# Patient Record
Sex: MALE | Race: BLACK OR AFRICAN AMERICAN | NOT HISPANIC OR LATINO | Employment: OTHER | URBAN - METROPOLITAN AREA
[De-identification: names, ages, dates, MRNs, and addresses within clinical notes are randomized per-mention and may not be internally consistent; named-entity substitution may affect disease eponyms.]

---

## 2017-05-17 PROBLEM — R06.09 DOE (DYSPNEA ON EXERTION): Status: ACTIVE | Noted: 2017-05-17

## 2017-05-17 PROBLEM — E11.621 DIABETIC ULCER OF TOE: Status: ACTIVE | Noted: 2017-05-17

## 2017-05-17 PROBLEM — L97.509 DIABETIC ULCER OF TOE: Status: ACTIVE | Noted: 2017-05-17

## 2017-07-18 PROBLEM — R06.09 DOE (DYSPNEA ON EXERTION): Status: RESOLVED | Noted: 2017-05-17 | Resolved: 2017-07-18

## 2017-07-18 PROBLEM — R05.3 CHRONIC COUGH: Status: ACTIVE | Noted: 2017-07-18

## 2018-03-27 ENCOUNTER — OFFICE VISIT (OUTPATIENT)
Dept: FAMILY MEDICINE | Facility: CLINIC | Age: 63
End: 2018-03-27
Payer: MEDICARE

## 2018-03-27 VITALS
RESPIRATION RATE: 16 BRPM | DIASTOLIC BLOOD PRESSURE: 74 MMHG | HEART RATE: 56 BPM | BODY MASS INDEX: 33.47 KG/M2 | HEIGHT: 68 IN | WEIGHT: 220.81 LBS | OXYGEN SATURATION: 98 % | SYSTOLIC BLOOD PRESSURE: 126 MMHG

## 2018-03-27 DIAGNOSIS — I42.9 CARDIOMYOPATHY, UNSPECIFIED TYPE: ICD-10-CM

## 2018-03-27 DIAGNOSIS — G63 NEUROPATHY ASSOCIATED WITH ENDOCRINE DISORDER: Primary | ICD-10-CM

## 2018-03-27 DIAGNOSIS — E11.59 TYPE 2 DIABETES MELLITUS WITH OTHER CIRCULATORY COMPLICATION, WITH LONG-TERM CURRENT USE OF INSULIN: ICD-10-CM

## 2018-03-27 DIAGNOSIS — Z79.4 TYPE 2 DIABETES MELLITUS WITH OTHER CIRCULATORY COMPLICATION, WITH LONG-TERM CURRENT USE OF INSULIN: ICD-10-CM

## 2018-03-27 DIAGNOSIS — I10 ESSENTIAL HYPERTENSION: ICD-10-CM

## 2018-03-27 DIAGNOSIS — I73.9 PAD (PERIPHERAL ARTERY DISEASE): ICD-10-CM

## 2018-03-27 DIAGNOSIS — E34.9 NEUROPATHY ASSOCIATED WITH ENDOCRINE DISORDER: Primary | ICD-10-CM

## 2018-03-27 DIAGNOSIS — N18.30 CKD (CHRONIC KIDNEY DISEASE) STAGE 3, GFR 30-59 ML/MIN: ICD-10-CM

## 2018-03-27 DIAGNOSIS — E78.2 MIXED HYPERLIPIDEMIA: ICD-10-CM

## 2018-03-27 DIAGNOSIS — Z89.511 STATUS POST BELOW KNEE AMPUTATION OF RIGHT LOWER EXTREMITY: ICD-10-CM

## 2018-03-27 DIAGNOSIS — K59.09 CHRONIC CONSTIPATION: ICD-10-CM

## 2018-03-27 PROCEDURE — 3045F PR MOST RECENT HEMOGLOBIN A1C LEVEL 7.0-9.0%: CPT | Mod: CPTII,S$GLB,, | Performed by: INTERNAL MEDICINE

## 2018-03-27 PROCEDURE — 99999 PR PBB SHADOW E&M-EST. PATIENT-LVL IV: CPT | Mod: PBBFAC,,, | Performed by: INTERNAL MEDICINE

## 2018-03-27 PROCEDURE — 3074F SYST BP LT 130 MM HG: CPT | Mod: CPTII,S$GLB,, | Performed by: INTERNAL MEDICINE

## 2018-03-27 PROCEDURE — 3078F DIAST BP <80 MM HG: CPT | Mod: CPTII,S$GLB,, | Performed by: INTERNAL MEDICINE

## 2018-03-27 PROCEDURE — 99499 UNLISTED E&M SERVICE: CPT | Mod: S$GLB,,, | Performed by: INTERNAL MEDICINE

## 2018-03-27 PROCEDURE — 99214 OFFICE O/P EST MOD 30 MIN: CPT | Mod: S$GLB,,, | Performed by: INTERNAL MEDICINE

## 2018-03-27 RX ORDER — LISINOPRIL 40 MG/1
40 TABLET ORAL DAILY
COMMUNITY
Start: 2018-03-21 | End: 2018-08-07 | Stop reason: SDUPTHER

## 2018-03-27 NOTE — PATIENT INSTRUCTIONS
Your recent labs showed a HgbA1c of 7.8. I have requested that you see podiatry to evaluate your left foot so that we can keep it healthy. I have prescribed a medicine for chronic constipation called linzess. I would like to see you back in about 3 months to see how you are doing.

## 2018-03-27 NOTE — PROGRESS NOTES
Subjective:       Patient ID: Brando Melgoza is a 62 y.o. male.    Chief Complaint: Establish Care    This is a new patient to me.  I have reviewed the PMH,  and FH for this patient. The patient had been seeing a doctor in [place], but he is changing to us. HE has a PMH significant for insulin-requiring diabetes and a BKA of his right leg. His last HgbA1c was pretty good at 7.8. HE has seen a kidney specialist for renal insufficiency recently. HE reports that he has been having trouble with constipation. He takes miralax daily, but only has BM's every few days.     Constipation   This is a chronic problem. The current episode started more than 1 month ago. The problem is unchanged. His stool frequency is 2 to 3 times per week. The stool is described as formed. The patient is not on a high fiber diet. He does not exercise regularly. There has been adequate water intake. Associated symptoms include bloating. Pertinent negatives include no abdominal pain, anorexia, back pain, diarrhea, difficulty urinating, fecal incontinence, fever, flatus, melena, nausea, rectal pain, vomiting or weight loss. Risk factors include obesity. He has tried diet changes and laxatives for the symptoms. The treatment provided mild relief.   Diabetes   He presents for his follow-up diabetic visit. He has type 2 diabetes mellitus. His disease course has been stable. There are no hypoglycemic associated symptoms. Pertinent negatives for hypoglycemia include no dizziness, headaches, nervousness/anxiousness or tremors. Associated symptoms include foot paresthesias. Pertinent negatives for diabetes include no blurred vision, no chest pain, no fatigue, no foot ulcerations, no polydipsia, no polyphagia, no visual change, no weakness and no weight loss. Symptoms are stable. Diabetic complications include peripheral neuropathy and PVD. Risk factors for coronary artery disease include diabetes mellitus, male sex, sedentary lifestyle and obesity.  Current diabetic treatment includes insulin injections.     Review of Systems   Constitutional: Negative for chills, fatigue, fever and weight loss.   HENT: Negative for congestion, ear discharge, ear pain, rhinorrhea and sore throat.    Eyes: Negative for blurred vision, discharge, redness and itching.   Respiratory: Negative for cough, chest tightness, shortness of breath and wheezing.    Cardiovascular: Negative for chest pain, palpitations and leg swelling.   Gastrointestinal: Positive for bloating and constipation. Negative for abdominal pain, anorexia, diarrhea, flatus, melena, nausea, rectal pain and vomiting.   Endocrine: Negative for cold intolerance, heat intolerance, polydipsia and polyphagia.   Genitourinary: Negative for difficulty urinating, dysuria, flank pain, frequency and hematuria.   Musculoskeletal: Negative for arthralgias, back pain, joint swelling and myalgias.   Skin: Negative for color change and rash.   Neurological: Negative for dizziness, tremors, weakness, numbness and headaches.   Psychiatric/Behavioral: Negative for dysphoric mood and sleep disturbance. The patient is not nervous/anxious.        Objective:      Physical Exam   Constitutional: He appears well-developed and well-nourished.   HENT:   Head: Normocephalic and atraumatic.   Right Ear: External ear normal. Tympanic membrane is not erythematous. No middle ear effusion.   Left Ear: External ear normal. Tympanic membrane is not erythematous.  No middle ear effusion.   Mouth/Throat: No posterior oropharyngeal edema or posterior oropharyngeal erythema. No tonsillar exudate.   Eyes: Conjunctivae and EOM are normal. Pupils are equal, round, and reactive to light.   Neck: No thyromegaly present.   Cardiovascular: Normal rate, regular rhythm, normal heart sounds and intact distal pulses.    No murmur heard.  Pulmonary/Chest: Effort normal and breath sounds normal. He has no wheezes.   Abdominal: Bowel sounds are normal. He exhibits  distension. He exhibits no mass. There is no tenderness. There is no guarding.   Musculoskeletal: He exhibits no edema or tenderness.   Lymphadenopathy:     He has no cervical adenopathy.   Neurological: He displays normal reflexes. No cranial nerve deficit.   Skin: Skin is warm and dry. No rash noted.   Psychiatric: He has a normal mood and affect. His behavior is normal.       Assessment and Plan:     Problem List Items Addressed This Visit     Essential hypertension - BP looks good on current medications.       HLD (hyperlipidemia) - cholesterol looked great in November.       PAD (peripheral artery disease) - HE is working on controlling his risk factors. We will send him to podiatry to evaluate his good foot.       CKD (chronic kidney disease) stage 3, GFR 30-59 ml/min - HE has seen nephrology recently.       S/P BKA (below knee amputation) - stable.       Cardiomyopathy - HE is working on controlling his risk factors.       Type 2 diabetes mellitus with circulatory disorder, with long-term current use of insulin - His last HgbA1c was 7.8 in February.  Continue insulin.         Other Visit Diagnoses     Neuropathy associated with endocrine disorder    -  Primary - We will send to podiatry for evaluation.     Relevant Orders    Ambulatory referral to Podiatry    Chronic constipation    - Let's try adding linzess for chronic constipation. He has tried daily miralax and has had unsatisfactory results.     Relevant Medications    POLYETHYLENE GLYCOL 3350 (MIRALAX ORAL)    linaclotide (LINZESS) 145 mcg Cap capsule

## 2018-04-04 ENCOUNTER — TELEPHONE (OUTPATIENT)
Dept: FAMILY MEDICINE | Facility: CLINIC | Age: 63
End: 2018-04-04

## 2018-04-04 NOTE — TELEPHONE ENCOUNTER
----- Message from Trish Ramos sent at 4/4/2018  2:21 PM CDT -----  Contact: Mahsa from [facility]/ [phone]  Mahsa called in to get a medical necessity form. Needs to include clinical notes and frequency of patients medication for lancet and test strips.     Please call and advice.     Fax# [phone]

## 2018-04-05 ENCOUNTER — OFFICE VISIT (OUTPATIENT)
Dept: PODIATRY | Facility: CLINIC | Age: 63
End: 2018-04-05
Payer: MEDICARE

## 2018-04-05 VITALS
DIASTOLIC BLOOD PRESSURE: 81 MMHG | BODY MASS INDEX: 33.49 KG/M2 | RESPIRATION RATE: 18 BRPM | WEIGHT: 221 LBS | SYSTOLIC BLOOD PRESSURE: 167 MMHG | HEIGHT: 68 IN | HEART RATE: 56 BPM

## 2018-04-05 DIAGNOSIS — L60.9 DISEASE OF NAIL: ICD-10-CM

## 2018-04-05 DIAGNOSIS — E11.42 DIABETIC POLYNEUROPATHY ASSOCIATED WITH TYPE 2 DIABETES MELLITUS: Primary | ICD-10-CM

## 2018-04-05 PROCEDURE — 99203 OFFICE O/P NEW LOW 30 MIN: CPT | Mod: 25,S$GLB,, | Performed by: PODIATRIST

## 2018-04-05 PROCEDURE — 3045F PR MOST RECENT HEMOGLOBIN A1C LEVEL 7.0-9.0%: CPT | Mod: CPTII,S$GLB,, | Performed by: PODIATRIST

## 2018-04-05 PROCEDURE — 3079F DIAST BP 80-89 MM HG: CPT | Mod: CPTII,S$GLB,, | Performed by: PODIATRIST

## 2018-04-05 PROCEDURE — 3077F SYST BP >= 140 MM HG: CPT | Mod: CPTII,S$GLB,, | Performed by: PODIATRIST

## 2018-04-05 PROCEDURE — 11720 DEBRIDE NAIL 1-5: CPT | Mod: Q7,S$GLB,, | Performed by: PODIATRIST

## 2018-04-05 NOTE — PROGRESS NOTES
Subjective:      Patient ID: Brando Melgoza is a 63 y.o. male.    Chief Complaint: Diabetic Foot Exam (PCP Dr Vaughan last seen 3/27/2018)    Brando is a 63 y.o. male who presents to the clinic for evaluation and treatment of high risk feet. Brando has a past medical history of Cataract; Chronic kidney disease; Diabetes mellitus; Diabetes mellitus, type 2; Hyperlipidemia; and Hypertension. The patient's chief complaint is long, thick toenails. This patient has documented high risk feet requiring routine maintenance secondary to peripheral neuropathy.    PCP: Odilia Vaughan MD    Date Last Seen by PCP: 3/27/2018    Current shoe gear:  Affected Foot: Tennis shoes     Unaffected Foot: N/A    Hemoglobin A1C   Date Value Ref Range Status   02/15/2018 7.8 (H) 4.5 - 6.2 % Final     Comment:     According to ADA guidelines, hemoglobin A1C <7.0% represents  optimal control in non-pregnant diabetic patients.  Different  metrics may apply to specific populations.   Standards of Medical Care in Diabetes - 2016.  For the purpose of screening for the presence of diabetes:  <5.7%     Consistent with the absence of diabetes  5.7-6.4%  Consistent with increasing risk for diabetes   (prediabetes)  >or=6.5%  Consistent with diabetes  Currently no consensus exists for use of hemoglobin A1C  for diagnosis of diabetes for children.     11/10/2017 7.6 (H) 4.5 - 6.2 % Final     Comment:     According to ADA guidelines, hemoglobin A1C <7.0% represents  optimal control in non-pregnant diabetic patients.  Different  metrics may apply to specific populations.   Standards of Medical Care in Diabetes - 2016.  For the purpose of screening for the presence of diabetes:  <5.7%     Consistent with the absence of diabetes  5.7-6.4%  Consistent with increasing risk for diabetes   (prediabetes)  >or=6.5%  Consistent with diabetes  Currently no consensus exists for use of hemoglobin A1C  for diagnosis of diabetes for children.     07/03/2017 8.4 (H) 4.5  - 6.2 % Final     Comment:     According to ADA guidelines, hemoglobin A1C <7.0% represents  optimal control in non-pregnant diabetic patients.  Different  metrics may apply to specific populations.   Standards of Medical Care in Diabetes - 2016.  For the purpose of screening for the presence of diabetes:  <5.7%     Consistent with the absence of diabetes  5.7-6.4%  Consistent with increasing risk for diabetes   (prediabetes)  >or=6.5%  Consistent with diabetes  Currently no consensus exists for use of hemoglobin A1C  for diagnosis of diabetes for children.         Review of Systems   Constitution: Negative for chills, fever and malaise/fatigue.   HENT: Negative for hearing loss.    Cardiovascular: Positive for leg swelling. Negative for claudication.   Respiratory: Negative for shortness of breath.    Skin: Positive for color change, dry skin, nail changes and unusual hair distribution. Negative for flushing and rash.   Musculoskeletal: Negative for joint pain and myalgias.   Neurological: Negative for loss of balance, numbness, paresthesias and sensory change.   Psychiatric/Behavioral: Negative for altered mental status.           Objective:      Physical Exam   Constitutional: He is oriented to person, place, and time. He appears well-developed and well-nourished.   Cardiovascular:   Pulses:       Right popliteal pulse not accessible.        Dorsalis pedis pulses are Detected w/ doppler on the left side.        Posterior tibial pulses are Detected w/ doppler on the left side. Right posterior tibial pulse not accessible.   Mild non-pitting edema noted to LLE  Monophasic PT pulses detected with doppler, LLE, non-palpable.    Musculoskeletal:        Right knee: He exhibits no swelling and no ecchymosis.        Left knee: He exhibits no swelling and no ecchymosis.        Right ankle: He exhibits normal range of motion, no swelling, no ecchymosis and normal pulse. No lateral malleolus, no medial malleolus and no head  of 5th metatarsal tenderness found. Achilles tendon exhibits no pain, no defect and normal Yates's test results.        Left ankle: He exhibits normal range of motion, no swelling, no ecchymosis and normal pulse. No lateral malleolus, no medial malleolus and no head of 5th metatarsal tenderness found. Achilles tendon exhibits no pain and normal Yates's test results.        Right lower leg: He exhibits no tenderness, no bony tenderness, no swelling, no edema and no deformity.        Left lower leg: He exhibits no tenderness, no swelling and no edema.        Right foot: There is normal range of motion and no deformity.        Left foot: There is normal range of motion and no deformity.   Decreased ROM with out joint pain nor crepitation to left foot and ankle joints.  Muscle strength 4/5 in all groups left  Hammertoes noted, digits 1-5 left    Feet:   Right Foot: amputated  Left Foot:   Skin Integrity: Positive for dry skin.   Neurological: He is alert and oriented to person, place, and time. A sensory deficit is present.   Decreased protective sensation noted to LLE     Skin: Skin is warm. Capillary refill takes more than 3 seconds. No abrasion, no bruising, no burn and no ecchymosis noted.   Skin temp is warm to warm from proximal to distal, left  Nails 1-5 left are elongated by  3-5mm's, thickened by 1-3 mm's, dystrophic, and are yellowish in  coloration . Xerosis noted.. No open lesions noted.      Psychiatric: He has a normal mood and affect. His speech is normal and behavior is normal. He is attentive.             Assessment:       Encounter Diagnoses   Name Primary?    Diabetic polyneuropathy associated with type 2 diabetes mellitus - Left Foot Yes    Disease of nail - Left Foot          Plan:       Brando was seen today for diabetic foot exam.    Diagnoses and all orders for this visit:    Diabetic polyneuropathy associated with type 2 diabetes mellitus - Left Foot    Disease of nail - Left Foot      I  counseled the patient on his conditions, their implications and medical management.        - Shoe inspection. Diabetic Foot Education. Patient reminded of the importance of good nutrition and blood sugar control to help prevent podiatric complications of diabetes. Patient instructed on proper foot hygeine. We discussed wearing proper shoe gear, daily foot inspections, never walking without protective shoe gear, never putting sharp instruments to feet, routine podiatric nail visits every 2-3 months.      - With patient's permission, nails were aggressively reduced and debrided x 5 to their soft tissue attachment mechanically and with electric , removing all offending nail and debris. Patient relates relief following the procedure. He will continue to monitor the areas daily, inspect his feet, wear protective shoe gear when ambulatory, moisturizer to maintain skin integrity and follow in this office in approximately 2-3 months, sooner p.r.n.

## 2018-04-05 NOTE — LETTER
April 8, 2018      Odilia Vaughan MD  [address] [address]           [facility]  [address], [address]   [facility] [address]  Phone: [phone]  Fax: [phone]          Patient: Brando Melgoza   MR Number: [id number]   YOB: 1955   Date of Visit: 4/5/2018       Dear Dr. Odilia Vaughan:    Thank you for referring Brando Melgoza to me for evaluation. Attached you will find relevant portions of my assessment and plan of care.    If you have questions, please do not hesitate to call me. I look forward to following Brando Melgoza along with you.    Sincerely,    Balta Smart, REGINO    Enclosure  CC:  No Recipients    If you would like to receive this communication electronically, please contact [email or web address] or [phone] to request more information on [facility] access.    For providers and/or their staff who would like to refer a patient to [facility], please contact us through our one-stop-shop provider referral line, [facility], at [phone].    If you feel you have received this communication in error or would no longer like to receive these types of communications, please e-mail [email or web address]

## 2018-04-05 NOTE — PATIENT INSTRUCTIONS
Your Diabetes Foot Care Program    Every day you depend on your feet to keep you moving. But when you have diabetes, your feet need special care. Even a small foot problem can become very serious. So dont take your feet for granted. By working with your diabetes healthcare team, you can learn how to protect your feet and keep them healthy.  Evaluating your feet  An evaluation helps your healthcare provider check the condition of your feet. The evaluation includes a review of your diabetes history and overall health. It may also include a foot exam, X-rays, or other tests. These can help show problems beneath the skin that you cant see or feel.  Medical history  You will be asked about your overall health and any history of foot problems. Youll also discuss your diabetes history, such as whether your blood sugar level has changed over time. It also includes questions about sensations of pain, tingling, pins and needles, or numbness. Your healthcare provider will also want to know if you have high blood pressure and heart disease, or if you smoke. Be sure to mention any medicines (including over-the-counter), supplements, or herbal remedies you take.  Foot exam  A foot exam checks the condition of different parts of your foot. First, your skin and nails are examined for any signs of infection. Blood flow is checked by feeling for the pulses in each foot. You may also have tests to study the nerves in the foot. These include using a small filament (wire) to see how sensitive your feet are. In certain cases, you will be asked to walk a short distance to check for bone, joint, and muscle problems.  Diagnostic tests  If needed, your healthcare provider will suggest certain tests to learn more about your feet. These include:  · Doppler tests to measure blood flow in the feet and lower leg.  · X-rays, which can show bone or joint problems.  · Other imaging tests, such as an MRI (magnetic resonance imaging), bone scan,  and CT (computed tomography) scan. These can help show bone infections.  · Other tests, such as vascular tests, which study the blood flow in your feet and legs. You may also have nerve studies to learn how sensitive your feet are.  Creating a foot care program  Based on the evaluation, your healthcare provider will create a foot care program for you. Your program may be as simple as starting a daily self-care routine and changing the types of shoes your wear. It may also involve treating minor foot problems, such as a corn or blister. In some cases, surgery will be needed to treat an infection or mechanical problems, such as hammer toes.  Preventing problems  When you have diabetes, its easier to prevent problems than to treat them later on. So see your healthcare team for regular checkups and foot care. Your healthcare team can also help you learn more about caring for your feet at home. For example, you may be told to avoid walking barefoot. Or you may be told that special footwear is needed to protect your feet.  Have regular checkups  Foot problems can develop quickly. So be sure to follow your healthcare teams schedule for regular checkups. During office visits, take off your shoes and socks as soon as you get in the exam room. Ask your healthcare provider to examine your feet for problems. This will make it easier to find and treat small skin irritations before they get worse. Regular checkups can also help keep track of the blood flow and feeling in your feet. If you have neuropathy (lack of feeling in your feet), you will need to have checkups more often.  Learn about self-care  The more you know about diabetes and your feet, the easier it will be to prevent problems. Members of your healthcare team can teach you how to inspect your feet and teach you to look for warning signs. They can also give you other foot care tips. During office visits, be sure to ask any questions you have.  Date Last Reviewed:  7/1/2016  © 0537-9323 [employer], [employer]. [address] [place] [address], [place] [address]. All rights reserved. This information is not intended as a substitute for professional medical care. Always follow your healthcare professional's instructions.

## 2018-04-06 PROCEDURE — 88312 SPECIAL STAINS GROUP 1: CPT | Performed by: PATHOLOGY

## 2018-05-22 PROBLEM — Z85.038 HISTORY OF COLON CANCER: Status: ACTIVE | Noted: 2018-05-22

## 2018-08-07 ENCOUNTER — OFFICE VISIT (OUTPATIENT)
Dept: FAMILY MEDICINE | Facility: CLINIC | Age: 63
End: 2018-08-07
Payer: MEDICARE

## 2018-08-07 ENCOUNTER — TELEPHONE (OUTPATIENT)
Dept: FAMILY MEDICINE | Facility: CLINIC | Age: 63
End: 2018-08-07

## 2018-08-07 VITALS
TEMPERATURE: 99 F | OXYGEN SATURATION: 97 % | DIASTOLIC BLOOD PRESSURE: 60 MMHG | HEIGHT: 68 IN | BODY MASS INDEX: 32.99 KG/M2 | WEIGHT: 217.69 LBS | HEART RATE: 57 BPM | SYSTOLIC BLOOD PRESSURE: 112 MMHG

## 2018-08-07 DIAGNOSIS — Z79.4 TYPE 2 DIABETES MELLITUS WITH OTHER CIRCULATORY COMPLICATION, WITH LONG-TERM CURRENT USE OF INSULIN: Primary | ICD-10-CM

## 2018-08-07 DIAGNOSIS — M25.511 ACUTE PAIN OF RIGHT SHOULDER: ICD-10-CM

## 2018-08-07 DIAGNOSIS — N18.30 CKD (CHRONIC KIDNEY DISEASE) STAGE 3, GFR 30-59 ML/MIN: ICD-10-CM

## 2018-08-07 DIAGNOSIS — E11.59 TYPE 2 DIABETES MELLITUS WITH OTHER CIRCULATORY COMPLICATION, WITH LONG-TERM CURRENT USE OF INSULIN: Primary | ICD-10-CM

## 2018-08-07 DIAGNOSIS — I10 ESSENTIAL HYPERTENSION: ICD-10-CM

## 2018-08-07 DIAGNOSIS — E66.9 OBESITY, UNSPECIFIED OBESITY SEVERITY, UNSPECIFIED OBESITY TYPE: ICD-10-CM

## 2018-08-07 DIAGNOSIS — Z89.511 STATUS POST BELOW KNEE AMPUTATION OF RIGHT LOWER EXTREMITY: ICD-10-CM

## 2018-08-07 DIAGNOSIS — I42.9 CARDIOMYOPATHY, UNSPECIFIED TYPE: ICD-10-CM

## 2018-08-07 PROCEDURE — 3045F PR MOST RECENT HEMOGLOBIN A1C LEVEL 7.0-9.0%: CPT | Mod: CPTII,S$GLB,, | Performed by: INTERNAL MEDICINE

## 2018-08-07 PROCEDURE — 99999 PR PBB SHADOW E&M-EST. PATIENT-LVL IV: CPT | Mod: PBBFAC,,, | Performed by: INTERNAL MEDICINE

## 2018-08-07 PROCEDURE — 3074F SYST BP LT 130 MM HG: CPT | Mod: CPTII,S$GLB,, | Performed by: INTERNAL MEDICINE

## 2018-08-07 PROCEDURE — 3078F DIAST BP <80 MM HG: CPT | Mod: CPTII,S$GLB,, | Performed by: INTERNAL MEDICINE

## 2018-08-07 PROCEDURE — 99214 OFFICE O/P EST MOD 30 MIN: CPT | Mod: S$GLB,,, | Performed by: INTERNAL MEDICINE

## 2018-08-07 PROCEDURE — 3008F BODY MASS INDEX DOCD: CPT | Mod: CPTII,S$GLB,, | Performed by: INTERNAL MEDICINE

## 2018-08-07 RX ORDER — LISINOPRIL 40 MG/1
40 TABLET ORAL DAILY
Qty: 90 TABLET | Refills: 1 | Status: SHIPPED | OUTPATIENT
Start: 2018-08-07 | End: 2019-02-05 | Stop reason: SDUPTHER
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
## 2018-08-07 NOTE — TELEPHONE ENCOUNTER
----- Message from Sarah High sent at 8/7/2018 11:15 AM CDT -----  Contact: [phone]  Patient requested to speak with the nurse because he needs a new prosthetic leg. Please call and advise.

## 2018-08-07 NOTE — PATIENT INSTRUCTIONS
We have evaluated your ill-fitting right lower limb prosthesis. It needs to be corrected because it poses a threat to your well-being by possibly causing you to trip. Your recent labs show that your diabetes is improving. Continue to work on diet. Please schedule follow-up appointment in October with me.

## 2018-08-07 NOTE — TELEPHONE ENCOUNTER
"Pt leg has shrunk and is not fitting well per prosthetic store  Right leg below the knee  [facility] [phone]  Write script   Eval and treat for right BKA prothethsis  Then send to [facility]  Pt made an appt to be seen today "my leg is falling off"    "

## 2018-08-13 ENCOUNTER — OFFICE VISIT (OUTPATIENT)
Dept: ORTHOPEDICS | Facility: CLINIC | Age: 63
End: 2018-08-13
Payer: MEDICARE

## 2018-08-13 ENCOUNTER — HOSPITAL ENCOUNTER (OUTPATIENT)
Dept: RADIOLOGY | Facility: HOSPITAL | Age: 63
Discharge: HOME | End: 2018-08-13
Attending: ORTHOPAEDIC SURGERY
Payer: MEDICARE

## 2018-08-13 VITALS — HEIGHT: 68 IN | WEIGHT: 217 LBS | BODY MASS INDEX: 32.89 KG/M2

## 2018-08-13 DIAGNOSIS — M25.511 RIGHT SHOULDER PAIN, UNSPECIFIED CHRONICITY: ICD-10-CM

## 2018-08-13 DIAGNOSIS — M25.511 RIGHT SHOULDER PAIN, UNSPECIFIED CHRONICITY: Primary | ICD-10-CM

## 2018-08-13 PROCEDURE — 99999 PR PBB SHADOW E&M-EST. PATIENT-LVL V: CPT | Mod: PBBFAC,,, | Performed by: ORTHOPAEDIC SURGERY

## 2018-08-13 PROCEDURE — 3008F BODY MASS INDEX DOCD: CPT | Mod: CPTII,S$GLB,, | Performed by: ORTHOPAEDIC SURGERY

## 2018-08-13 PROCEDURE — 73030 X-RAY EXAM OF SHOULDER: CPT | Mod: 26,RT,, | Performed by: RADIOLOGY

## 2018-08-13 PROCEDURE — 99203 OFFICE O/P NEW LOW 30 MIN: CPT | Mod: 25,S$GLB,, | Performed by: ORTHOPAEDIC SURGERY

## 2018-08-13 PROCEDURE — 73030 X-RAY EXAM OF SHOULDER: CPT | Mod: TC,FY,RT

## 2018-08-13 PROCEDURE — 20610 DRAIN/INJ JOINT/BURSA W/O US: CPT | Mod: RT,S$GLB,, | Performed by: ORTHOPAEDIC SURGERY

## 2018-08-13 RX ORDER — MELOXICAM 15 MG/1
15 TABLET ORAL DAILY
Qty: 30 TABLET | Refills: 1 | Status: SHIPPED | OUTPATIENT
Start: 2018-08-13 | End: 2018-09-12

## 2018-08-13 RX ORDER — TRIAMCINOLONE ACETONIDE 40 MG/ML
40 INJECTION, SUSPENSION INTRA-ARTICULAR; INTRAMUSCULAR
Status: COMPLETED | OUTPATIENT
Start: 2018-08-13 | End: 2018-08-13

## 2018-08-13 RX ADMIN — TRIAMCINOLONE ACETONIDE 40 MG: 40 INJECTION, SUSPENSION INTRA-ARTICULAR; INTRAMUSCULAR at 09:08
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
## 2018-08-13 NOTE — LETTER
August 13, 2018        Odilia Vaughan MD  [address] [facility] [address]             [facility]  [address] [place] [address] [address]  [facility] [address]  Phone: [phone]   Patient: Brando Melgoza   MR Number: [id number]   YOB: 1955   Date of Visit: 8/13/2018       Dear Dr. Vaughan:    Thank you for referring Brando Melgoza to me for evaluation. Below are the relevant portions of my assessment and plan of care.            If you have questions, please do not hesitate to call me. I look forward to following Brando along with you.    Sincerely,      Ramón Aguilar Jr., MD           CC  No Recipients

## 2018-08-22 ENCOUNTER — TELEPHONE (OUTPATIENT)
Dept: FAMILY MEDICINE | Facility: CLINIC | Age: 63
End: 2018-08-22

## 2018-08-28 ENCOUNTER — TELEPHONE (OUTPATIENT)
Dept: INTERNAL MEDICINE | Facility: CLINIC | Age: 63
End: 2018-08-28

## 2018-08-28 RX ORDER — LANCETS
1 EACH MISCELLANEOUS
Qty: 100 EACH | Refills: 11 | Status: SHIPPED | OUTPATIENT
Start: 2018-08-28 | End: 2021-07-20

## 2018-08-28 NOTE — TELEPHONE ENCOUNTER
OK. I sent an electronic script for lancets. I wrote out meter and test strips because I could not find true metrix.

## 2018-08-28 NOTE — TELEPHONE ENCOUNTER
----- Message from Leidy Lr sent at 8/27/2018  3:01 PM CDT -----  Contact: Rylee/PHN/[phone]  They need medical necessity form for a true metrix meter, lancets and test strips.   They also need the frequency the member is checking blood sugar and supporting documentation faxed to [phone].

## 2018-08-31 ENCOUNTER — TELEPHONE (OUTPATIENT)
Dept: FAMILY MEDICINE | Facility: CLINIC | Age: 63
End: 2018-08-31

## 2018-08-31 NOTE — TELEPHONE ENCOUNTER
----- Message from Nataliia Powell sent at 8/30/2018  3:23 PM CDT -----  Contact: [employer] [facility] [phone]  Rylee is requesting to speak with you concerning the patient meter  Please call

## 2018-09-13 ENCOUNTER — OFFICE VISIT (OUTPATIENT)
Dept: ORTHOPEDICS | Facility: CLINIC | Age: 63
End: 2018-09-13
Payer: MEDICARE

## 2018-09-13 VITALS — WEIGHT: 214 LBS | BODY MASS INDEX: 32.43 KG/M2 | HEIGHT: 68 IN

## 2018-09-13 DIAGNOSIS — M75.42 IMPINGEMENT SYNDROME OF SHOULDER, LEFT: ICD-10-CM

## 2018-09-13 DIAGNOSIS — M75.41 IMPINGEMENT SYNDROME OF RIGHT SHOULDER: ICD-10-CM

## 2018-09-13 DIAGNOSIS — M25.511 RIGHT SHOULDER PAIN, UNSPECIFIED CHRONICITY: Primary | ICD-10-CM

## 2018-09-13 PROCEDURE — 99214 OFFICE O/P EST MOD 30 MIN: CPT | Mod: S$PBB,,, | Performed by: ORTHOPAEDIC SURGERY

## 2018-09-13 PROCEDURE — 99213 OFFICE O/P EST LOW 20 MIN: CPT | Mod: PBBFAC,PO | Performed by: ORTHOPAEDIC SURGERY

## 2018-09-13 PROCEDURE — 99999 PR PBB SHADOW E&M-EST. PATIENT-LVL III: CPT | Mod: PBBFAC,,, | Performed by: ORTHOPAEDIC SURGERY

## 2018-09-13 PROCEDURE — 3008F BODY MASS INDEX DOCD: CPT | Mod: CPTII,,, | Performed by: ORTHOPAEDIC SURGERY

## 2018-09-13 PROCEDURE — 1101F PT FALLS ASSESS-DOCD LE1/YR: CPT | Mod: CPTII,,, | Performed by: ORTHOPAEDIC SURGERY
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
## 2018-09-21 ENCOUNTER — TELEPHONE (OUTPATIENT)
Dept: ORTHOPEDICS | Facility: CLINIC | Age: 63
End: 2018-09-21

## 2018-09-21 NOTE — TELEPHONE ENCOUNTER
Spoke with Annmarie to update MRI order. Verbalized understanding.    ----- Message from Ruthann Suresh sent at 9/21/2018 11:53 AM CDT -----  Contact: [phone] with with [facility]   [facility] is requesting a new order for MRI without contrast for pt's . Please advise.

## 2018-09-25 DIAGNOSIS — E78.5 HYPERLIPIDEMIA: ICD-10-CM

## 2018-09-25 RX ORDER — ATORVASTATIN CALCIUM 80 MG/1
80 TABLET, FILM COATED ORAL NIGHTLY
Qty: 90 TABLET | Refills: 1 | Status: SHIPPED | OUTPATIENT
Start: 2018-09-25 | End: 2019-02-05 | Stop reason: SDUPTHER

## 2018-09-25 NOTE — TELEPHONE ENCOUNTER
Left message to schedule blood work appt (lipid) and he is due for a follow up appt in the beginning of November

## 2018-10-08 PROBLEM — M67.911 ROTATOR CUFF DISORDER, RIGHT: Status: ACTIVE | Noted: 2018-10-08

## 2018-10-08 PROBLEM — Z01.818 PRE-OP EVALUATION: Status: ACTIVE | Noted: 2018-10-08

## 2018-10-08 PROBLEM — Z89.511 S/P BKA (BELOW KNEE AMPUTATION), RIGHT: Status: ACTIVE | Noted: 2018-10-08

## 2018-11-08 ENCOUNTER — OFFICE VISIT (OUTPATIENT)
Dept: FAMILY MEDICINE | Facility: CLINIC | Age: 63
End: 2018-11-08
Payer: MEDICARE

## 2018-11-08 VITALS
DIASTOLIC BLOOD PRESSURE: 68 MMHG | SYSTOLIC BLOOD PRESSURE: 110 MMHG | WEIGHT: 216 LBS | HEIGHT: 68 IN | HEART RATE: 53 BPM | OXYGEN SATURATION: 98 % | BODY MASS INDEX: 32.74 KG/M2 | TEMPERATURE: 98 F

## 2018-11-08 DIAGNOSIS — Z79.4 TYPE 2 DIABETES MELLITUS WITH DIABETIC PERIPHERAL ANGIOPATHY WITHOUT GANGRENE, WITH LONG-TERM CURRENT USE OF INSULIN: ICD-10-CM

## 2018-11-08 DIAGNOSIS — I10 ESSENTIAL HYPERTENSION: Primary | ICD-10-CM

## 2018-11-08 DIAGNOSIS — I73.9 PAD (PERIPHERAL ARTERY DISEASE): ICD-10-CM

## 2018-11-08 DIAGNOSIS — E11.51 TYPE 2 DIABETES MELLITUS WITH DIABETIC PERIPHERAL ANGIOPATHY WITHOUT GANGRENE, WITH LONG-TERM CURRENT USE OF INSULIN: ICD-10-CM

## 2018-11-08 DIAGNOSIS — Z79.4 TYPE 2 DIABETES MELLITUS WITH STAGE 3 CHRONIC KIDNEY DISEASE, WITH LONG-TERM CURRENT USE OF INSULIN: ICD-10-CM

## 2018-11-08 DIAGNOSIS — I42.9 CARDIOMYOPATHY, UNSPECIFIED TYPE: ICD-10-CM

## 2018-11-08 DIAGNOSIS — N18.30 TYPE 2 DIABETES MELLITUS WITH STAGE 3 CHRONIC KIDNEY DISEASE, WITH LONG-TERM CURRENT USE OF INSULIN: ICD-10-CM

## 2018-11-08 DIAGNOSIS — Z89.511 S/P BKA (BELOW KNEE AMPUTATION), RIGHT: ICD-10-CM

## 2018-11-08 DIAGNOSIS — I50.30 (HFPEF) HEART FAILURE WITH PRESERVED EJECTION FRACTION: ICD-10-CM

## 2018-11-08 DIAGNOSIS — M25.511 CHRONIC RIGHT SHOULDER PAIN: ICD-10-CM

## 2018-11-08 DIAGNOSIS — E11.22 TYPE 2 DIABETES MELLITUS WITH STAGE 3 CHRONIC KIDNEY DISEASE, WITH LONG-TERM CURRENT USE OF INSULIN: ICD-10-CM

## 2018-11-08 DIAGNOSIS — E78.2 MIXED HYPERLIPIDEMIA: ICD-10-CM

## 2018-11-08 DIAGNOSIS — N18.30 CKD (CHRONIC KIDNEY DISEASE) STAGE 3, GFR 30-59 ML/MIN: ICD-10-CM

## 2018-11-08 DIAGNOSIS — G89.29 CHRONIC RIGHT SHOULDER PAIN: ICD-10-CM

## 2018-11-08 PROBLEM — Z01.818 PRE-OP EVALUATION: Status: RESOLVED | Noted: 2018-10-08 | Resolved: 2018-11-08

## 2018-11-08 PROCEDURE — 1101F PT FALLS ASSESS-DOCD LE1/YR: CPT | Mod: CPTII,S$GLB,, | Performed by: INTERNAL MEDICINE

## 2018-11-08 PROCEDURE — 3008F BODY MASS INDEX DOCD: CPT | Mod: CPTII,S$GLB,, | Performed by: INTERNAL MEDICINE

## 2018-11-08 PROCEDURE — 99999 PR PBB SHADOW E&M-EST. PATIENT-LVL IV: CPT | Mod: PBBFAC,,, | Performed by: INTERNAL MEDICINE

## 2018-11-08 PROCEDURE — 99214 OFFICE O/P EST MOD 30 MIN: CPT | Mod: S$GLB,,, | Performed by: INTERNAL MEDICINE

## 2018-11-08 PROCEDURE — 3078F DIAST BP <80 MM HG: CPT | Mod: CPTII,S$GLB,, | Performed by: INTERNAL MEDICINE

## 2018-11-08 PROCEDURE — 3074F SYST BP LT 130 MM HG: CPT | Mod: CPTII,S$GLB,, | Performed by: INTERNAL MEDICINE

## 2018-11-08 PROCEDURE — 3045F PR MOST RECENT HEMOGLOBIN A1C LEVEL 7.0-9.0%: CPT | Mod: CPTII,S$GLB,, | Performed by: INTERNAL MEDICINE

## 2018-11-08 PROCEDURE — 99499 UNLISTED E&M SERVICE: CPT | Mod: S$GLB,,, | Performed by: INTERNAL MEDICINE

## 2018-11-08 NOTE — PATIENT INSTRUCTIONS
We have reviewed your prescription medications.  You have had your flu shot. I have ordered labs today to checkc holesterol and HgbA1c. Sugars have been high in the morning. Let's try increasing novolog with supper to 25 units. Continue 80 units levemir at night. I have completed your handicapped license application. Continue to follow-up with nephrology and cardiology. Follow-up with orthopedics about shoulder pain.

## 2018-11-10 NOTE — PROGRESS NOTES
Subjective:       Patient ID: Brando Melgoza is a 63 y.o. male.    Chief Complaint: Follow-up     I have reviewed the PMH,  and  for this patient. He is here for follow-up of chronic conditions. He has PMH significant for hypertension, hyperlipidemia, Diabetes on insulin, PAD, cardiomyopathy, and amputation of leg.  He had his flu shot already. He had a recent hip replacement. He is seeing an orthopedic doctor about his right shoulder and they have recommended surgery. He had some labs done in September. His CBC was OK with mild anemia. Hct was 35.8. His BMP showed renal insufficiency with a CR of 1.6. HE has been seeing nephrology. His last HgbA1c was 7.2 in June. He reports that his BS has been over 200 in the mornings, but usually around 120 at night. He takes 80 units of levemir at night and 20 units of novolog before meals -- usually twice a day.       Diabetes   He presents for his follow-up diabetic visit. He has type 2 diabetes mellitus. His disease course has been fluctuating. There are no hypoglycemic associated symptoms. Pertinent negatives for hypoglycemia include no dizziness, headaches, nervousness/anxiousness or tremors. Pertinent negatives for diabetes include no blurred vision, no chest pain, no fatigue, no foot paresthesias, no foot ulcerations, no polydipsia, no polyphagia, no polyuria, no visual change, no weakness and no weight loss. There are no hypoglycemic complications. Symptoms are worsening. Diabetic complications include nephropathy. Pertinent negatives for diabetic complications include no autonomic neuropathy, CVA or heart disease. Risk factors for coronary artery disease include hypertension, male sex, obesity and diabetes mellitus. Current diabetic treatment includes insulin injections. He is compliant with treatment some of the time. He is following a generally healthy diet. He has had a previous visit with a dietitian. He rarely participates in exercise. An ACE  inhibitor/angiotensin II receptor blocker is being taken. He sees a podiatrist.Eye exam is current.     Review of Systems   Constitutional: Negative for chills, fatigue, fever and weight loss.   HENT: Negative for congestion, ear discharge, ear pain, rhinorrhea and sore throat.    Eyes: Negative for blurred vision, discharge, redness and itching.   Respiratory: Negative for cough, chest tightness, shortness of breath and wheezing.    Cardiovascular: Negative for chest pain, palpitations and leg swelling.   Gastrointestinal: Negative for abdominal pain, constipation, diarrhea, nausea and vomiting.   Endocrine: Negative for cold intolerance, heat intolerance, polydipsia, polyphagia and polyuria.   Genitourinary: Negative for dysuria, flank pain, frequency and hematuria.   Musculoskeletal: Negative for arthralgias, back pain, joint swelling and myalgias.   Skin: Negative for color change and rash.   Neurological: Negative for dizziness, tremors, weakness, numbness and headaches.   Psychiatric/Behavioral: Negative for dysphoric mood and sleep disturbance. The patient is not nervous/anxious.        Objective:      Physical Exam   Constitutional: He appears well-developed and well-nourished.   HENT:   Head: Normocephalic and atraumatic.   Right Ear: External ear normal. Tympanic membrane is not erythematous. No middle ear effusion.   Left Ear: External ear normal. Tympanic membrane is not erythematous.  No middle ear effusion.   Mouth/Throat: No posterior oropharyngeal edema or posterior oropharyngeal erythema. No tonsillar exudate.   Eyes: Conjunctivae and EOM are normal. Pupils are equal, round, and reactive to light.   Neck: No thyromegaly present.   Cardiovascular: Normal rate, regular rhythm, normal heart sounds and intact distal pulses.   No murmur heard.  Pulmonary/Chest: Effort normal and breath sounds normal. He has no wheezes.   Abdominal: He exhibits no distension. There is no tenderness.   Musculoskeletal: He  exhibits no edema or tenderness.   Right leg amputated below the knee.    Lymphadenopathy:     He has no cervical adenopathy.   Neurological: He displays normal reflexes. No cranial nerve deficit.   Skin: Skin is warm and dry. No rash noted.   Psychiatric: He has a normal mood and affect. His behavior is normal.       Assessment and Plan:     Problem List Items Addressed This Visit        Cardiac/Vascular    Essential hypertension - Primary - BP looks good on current meds. Stable.       HLD (hyperlipidemia) - cholesterol not checked lately. On atorvastatin 80.     Relevant Orders    Lipid panel (Completed)    PAD (peripheral artery disease) - stable. Sees cardiology      (HFpEF) heart failure with preserved ejection fraction - stable. Sees cardiology      Cardiomyopathy - stable. Sees cardiology       Renal/    CKD (chronic kidney disease) stage 3, GFR 30-59 ml/min - stable. Sees nephrology       Endocrine    Type 2 diabetes mellitus with stage 3 chronic kidney disease, with long-term current use of insulin - sugars have been too high in am. Let's try increasing novolog at supper to 25 units.        Orthopedic    S/P BKA (below knee amputation), right - stable. Had leg adjusted recently.-- working better.       Chronic right shoulder pain - continue to follow-up with orthopedics. Still sore.

## 2018-11-21 RX ORDER — PEN NEEDLE, DIABETIC 30 GX3/16"
1 NEEDLE, DISPOSABLE MISCELLANEOUS 3 TIMES DAILY
Qty: 100 EACH | Refills: 6 | Status: SHIPPED | OUTPATIENT
Start: 2018-11-21 | End: 2019-11-03 | Stop reason: SDUPTHER

## 2018-11-21 NOTE — TELEPHONE ENCOUNTER
"----- Message from Berta Temple sent at 11/21/2018  8:57 AM CST -----  Contact: [phone] /self  Patient is requesting to have a refill of   BD ULTRA-FINE JERRY PEN NEEDLES 32 gauge x 5/32" Ndle. USE ONE  THREE TIMES DAILY WITH  MEALS    sent to [facility] - [id number], [place] - [id number]  . Please advise.     "

## 2019-02-05 DIAGNOSIS — E78.5 HYPERLIPIDEMIA: ICD-10-CM

## 2019-02-05 RX ORDER — ATORVASTATIN CALCIUM 80 MG/1
TABLET, FILM COATED ORAL
Qty: 90 TABLET | Refills: 0 | Status: SHIPPED | OUTPATIENT
Start: 2019-02-05 | End: 2019-06-03 | Stop reason: SDUPTHER

## 2019-02-05 RX ORDER — LISINOPRIL 40 MG/1
TABLET ORAL
Qty: 90 TABLET | Refills: 0 | Status: SHIPPED | OUTPATIENT
Start: 2019-02-05 | End: 2019-05-06 | Stop reason: SDUPTHER

## 2019-05-06 RX ORDER — LISINOPRIL 40 MG/1
TABLET ORAL
Qty: 90 TABLET | Refills: 0 | Status: SHIPPED | OUTPATIENT
Start: 2019-05-06 | End: 2019-08-12 | Stop reason: SDUPTHER

## 2019-05-13 RX ORDER — INSULIN ASPART 100 [IU]/ML
20 INJECTION, SOLUTION INTRAVENOUS; SUBCUTANEOUS 3 TIMES DAILY
Qty: 15 ML | Refills: 0 | Status: SHIPPED | OUTPATIENT
Start: 2019-05-13 | End: 2019-05-14 | Stop reason: SDUPTHER

## 2019-05-13 NOTE — TELEPHONE ENCOUNTER
----- Message from Berta Temple sent at 5/13/2019  9:29 AM CDT -----  Contact: [phone] /self  Type:  RX Refill Request    Who Called: self  Refill or New Rx:Refill  RX Name and Strength:NOVOLOG FLEXPEN U-100 INSULIN 100 unit/mL InPn pen  How is the patient currently taking it? (ex. 1XDay):INJECT 20 UNITS SUBCUTANEOUSLY THREE TIMES DAILY    Is this a 30 day or 90 day RX:90 request  Preferred Pharmacy with phone number:[facility] - [place], [place] - [phone]  Local or Mail Order:local  Ordering Provider:Dr. Vaughan  Would the patient rather a call back or a response via [facility]? call  Best Call Back Number:  Additional Information:

## 2019-05-14 ENCOUNTER — OFFICE VISIT (OUTPATIENT)
Dept: FAMILY MEDICINE | Facility: CLINIC | Age: 64
End: 2019-05-14
Payer: MEDICARE

## 2019-05-14 VITALS
BODY MASS INDEX: 32.24 KG/M2 | HEIGHT: 69 IN | TEMPERATURE: 98 F | DIASTOLIC BLOOD PRESSURE: 68 MMHG | SYSTOLIC BLOOD PRESSURE: 132 MMHG | WEIGHT: 217.69 LBS | HEART RATE: 55 BPM | OXYGEN SATURATION: 98 %

## 2019-05-14 DIAGNOSIS — N18.30 TYPE 2 DIABETES MELLITUS WITH STAGE 3 CHRONIC KIDNEY DISEASE, WITH LONG-TERM CURRENT USE OF INSULIN: Primary | ICD-10-CM

## 2019-05-14 DIAGNOSIS — E78.2 MIXED HYPERLIPIDEMIA: ICD-10-CM

## 2019-05-14 DIAGNOSIS — D48.5 NEOPLASM OF UNCERTAIN BEHAVIOR OF SKIN: ICD-10-CM

## 2019-05-14 DIAGNOSIS — Z79.4 TYPE 2 DIABETES MELLITUS WITH STAGE 3 CHRONIC KIDNEY DISEASE, WITH LONG-TERM CURRENT USE OF INSULIN: Primary | ICD-10-CM

## 2019-05-14 DIAGNOSIS — G89.29 CHRONIC RIGHT SHOULDER PAIN: ICD-10-CM

## 2019-05-14 DIAGNOSIS — I10 ESSENTIAL HYPERTENSION: ICD-10-CM

## 2019-05-14 DIAGNOSIS — Z89.511 S/P BKA (BELOW KNEE AMPUTATION), RIGHT: ICD-10-CM

## 2019-05-14 DIAGNOSIS — E11.22 TYPE 2 DIABETES MELLITUS WITH STAGE 3 CHRONIC KIDNEY DISEASE, WITH LONG-TERM CURRENT USE OF INSULIN: Primary | ICD-10-CM

## 2019-05-14 DIAGNOSIS — I42.9 CARDIOMYOPATHY, UNSPECIFIED TYPE: ICD-10-CM

## 2019-05-14 DIAGNOSIS — M67.911 ROTATOR CUFF DISORDER, RIGHT: ICD-10-CM

## 2019-05-14 DIAGNOSIS — M25.511 CHRONIC RIGHT SHOULDER PAIN: ICD-10-CM

## 2019-05-14 DIAGNOSIS — E11.9 ENCOUNTER FOR DIABETIC FOOT EXAM: ICD-10-CM

## 2019-05-14 PROCEDURE — 99999 PR PBB SHADOW E&M-EST. PATIENT-LVL IV: ICD-10-PCS | Mod: PBBFAC,,, | Performed by: INTERNAL MEDICINE

## 2019-05-14 PROCEDURE — 3078F PR MOST RECENT DIASTOLIC BLOOD PRESSURE < 80 MM HG: ICD-10-PCS | Mod: CPTII,S$GLB,, | Performed by: INTERNAL MEDICINE

## 2019-05-14 PROCEDURE — 3045F PR MOST RECENT HEMOGLOBIN A1C LEVEL 7.0-9.0%: CPT | Mod: CPTII,S$GLB,, | Performed by: INTERNAL MEDICINE

## 2019-05-14 PROCEDURE — 99215 OFFICE O/P EST HI 40 MIN: CPT | Mod: S$GLB,,, | Performed by: INTERNAL MEDICINE

## 2019-05-14 PROCEDURE — 1101F PR PT FALLS ASSESS DOC 0-1 FALLS W/OUT INJ PAST YR: ICD-10-PCS | Mod: CPTII,S$GLB,, | Performed by: INTERNAL MEDICINE

## 2019-05-14 PROCEDURE — 99499 RISK ADDL DX/OHS AUDIT: ICD-10-PCS | Mod: S$GLB,,, | Performed by: INTERNAL MEDICINE

## 2019-05-14 PROCEDURE — 99215 PR OFFICE/OUTPT VISIT, EST, LEVL V, 40-54 MIN: ICD-10-PCS | Mod: S$GLB,,, | Performed by: INTERNAL MEDICINE

## 2019-05-14 PROCEDURE — 99499 UNLISTED E&M SERVICE: CPT | Mod: S$GLB,,, | Performed by: INTERNAL MEDICINE

## 2019-05-14 PROCEDURE — 3078F DIAST BP <80 MM HG: CPT | Mod: CPTII,S$GLB,, | Performed by: INTERNAL MEDICINE

## 2019-05-14 PROCEDURE — 99999 PR PBB SHADOW E&M-EST. PATIENT-LVL IV: CPT | Mod: PBBFAC,,, | Performed by: INTERNAL MEDICINE

## 2019-05-14 PROCEDURE — 3075F SYST BP GE 130 - 139MM HG: CPT | Mod: CPTII,S$GLB,, | Performed by: INTERNAL MEDICINE

## 2019-05-14 PROCEDURE — 3045F PR MOST RECENT HEMOGLOBIN A1C LEVEL 7.0-9.0%: ICD-10-PCS | Mod: CPTII,S$GLB,, | Performed by: INTERNAL MEDICINE

## 2019-05-14 PROCEDURE — 1101F PT FALLS ASSESS-DOCD LE1/YR: CPT | Mod: CPTII,S$GLB,, | Performed by: INTERNAL MEDICINE

## 2019-05-14 PROCEDURE — 3075F PR MOST RECENT SYSTOLIC BLOOD PRESS GE 130-139MM HG: ICD-10-PCS | Mod: CPTII,S$GLB,, | Performed by: INTERNAL MEDICINE

## 2019-05-14 RX ORDER — AMLODIPINE BESYLATE 10 MG/1
TABLET ORAL
Refills: 3 | COMMUNITY
Start: 2019-04-17 | End: 2019-10-28 | Stop reason: SDUPTHER

## 2019-05-14 RX ORDER — INSULIN ASPART 100 [IU]/ML
20 INJECTION, SOLUTION INTRAVENOUS; SUBCUTANEOUS 3 TIMES DAILY
Qty: 15 ML | Refills: 5 | Status: SHIPPED | OUTPATIENT
Start: 2019-05-14 | End: 2019-12-29

## 2019-05-14 RX ORDER — KETOCONAZOLE 20 MG/ML
SHAMPOO, SUSPENSION TOPICAL
Qty: 120 ML | Refills: 5 | Status: SHIPPED | OUTPATIENT
Start: 2019-05-14 | End: 2020-04-06 | Stop reason: SDUPTHER

## 2019-05-14 RX ORDER — TRIAMCINOLONE ACETONIDE 1 MG/G
CREAM TOPICAL
COMMUNITY
Start: 2019-03-07 | End: 2019-06-20 | Stop reason: SDUPTHER

## 2019-05-14 RX ORDER — MELOXICAM 15 MG/1
15 TABLET ORAL DAILY
Qty: 30 TABLET | Refills: 3 | Status: SHIPPED | OUTPATIENT
Start: 2019-05-14 | End: 2020-05-13

## 2019-05-14 NOTE — MEDICAL/APP STUDENT
Subjective:       Patient ID: Brando Melgoza is a 64 y.o. male.    Chief Complaint: Follow-up    HPI  Review of Systems    Objective:      Physical Exam   Cardiovascular:   Pulses:       Dorsalis pedis pulses are 2+ on the left side.   Feet:   Right Foot: amputated  Left Foot:   Protective Sensation: 10 sites tested. 7 sites sensed.    ++++ Left ankle edema prevented ability to palpate posterior tibialis pulse   Assessment:       1. Type 2 diabetes mellitus with stage 3 chronic kidney disease, with long-term current use of insulin    2. Neoplasm of uncertain behavior of skin    3. S/P BKA (below knee amputation), right    4. Cardiomyopathy, unspecified type    5. Essential hypertension    6. Mixed hyperlipidemia    7. Rotator cuff disorder, right    8. Chronic right shoulder pain    9. Encounter for diabetic foot exam        Plan:

## 2019-05-14 NOTE — PATIENT INSTRUCTIONS
We have reviewed your prescription medications. I have sent you a prescription for meloxicam for shoulder pain. See shoulder doctor for follow-up as soon as you can. I have ordered your labs. I refilled your humalog again. You probably dont want to do 90 days because the insulin might go bad. We did foot exam today. Follow-up with dermatology about dermatitis.

## 2019-05-20 NOTE — PROGRESS NOTES
Subjective:       Patient ID: Brando Melgoza is a 64 y.o. male.    Chief Complaint: Follow-up     I have reviewed the [facility],  and  for this patient.  The patient presents today for follow-up of chronic conditions. He  has a past medical history of Cancer, Cataract, Chronic kidney disease, Diabetes mellitus, Diabetes mellitus, type 2, Hyperlipidemia, and Hypertension. His last HgbA1c was 7.4. In November.  He has been taking lantus 80 units a day. He is due for a foot exam.His right leg has been amputated.  He also takes 20-25 units of humalog with meals.  He has a history of a rotator cuff injury. He needs surgery, but he put it off because his wife had a hip replacement. His BP is initially elevated at 150/74. When I retook it, it was better at 132/68.   Diabetes   He presents for his follow-up diabetic visit. He has type 2 diabetes mellitus. His disease course has been stable. Pertinent negatives for hypoglycemia include no confusion, dizziness, headaches, hunger, mood changes, nervousness/anxiousness, pallor, seizures, sleepiness, speech difficulty, sweats or tremors. Associated symptoms include foot paresthesias. Pertinent negatives for diabetes include no blurred vision, no chest pain, no fatigue, no foot ulcerations, no polydipsia, no polyphagia, no polyuria, no visual change, no weakness and no weight loss. There are no hypoglycemic complications. Symptoms are stable. There are no diabetic complications. Risk factors for coronary artery disease include male sex, obesity, hypertension, diabetes mellitus and sedentary lifestyle. He is following a generally unhealthy diet. When asked about meal planning, he reported none. He rarely participates in exercise. An ACE inhibitor/angiotensin II receptor blocker is being taken. He does not see a podiatrist.Eye exam is not current.     Active Ambulatory Problems     Diagnosis Date Noted    Gait abnormality 07/08/2014    Right leg weakness 07/08/2014    Phantom  limb pain 07/08/2014    Essential hypertension 07/17/2014    HLD (hyperlipidemia) 07/17/2014    DM (diabetes mellitus) 07/17/2014    PAD (peripheral artery disease) 07/17/2014    CKD (chronic kidney disease) stage 3, GFR 30-59 ml/min 07/17/2014    S/P BKA (below knee amputation) 07/17/2014    Obesity, unspecified obesity severity, unspecified obesity type 07/17/2014    Sarcoidosis 07/17/2014    Colon polyps 10/23/2014    Hemorrhoids, internal 10/23/2014    Personal history of colon cancer 10/23/2014    Proliferative diabetic retinopathy 01/09/2015    Senile nuclear sclerosis 01/14/2015    Orchitis and epididymitis 04/08/2015    Erectile dysfunction 07/16/2015    BPH (benign prostatic hyperplasia) 07/16/2015    Prosthesis fitting 07/16/2015    Impingement syndrome of left shoulder 09/23/2015    Left shoulder pain 06/22/2016    (HFpEF) heart failure with preserved ejection fraction 07/15/2016    Cardiomyopathy 07/15/2016    LVH (left ventricular hypertrophy) 07/15/2016    Type 2 diabetes mellitus with stage 3 chronic kidney disease, with long-term current use of insulin 05/17/2017    Chronic cough 07/18/2017    History of colon cancer 05/22/2018    S/P BKA (below knee amputation), right 10/08/2018    Rotator cuff disorder, right 10/08/2018    Chronic right shoulder pain 11/08/2018    Uncontrolled type 2 diabetes mellitus with both eyes affected by proliferative retinopathy and macular edema, with long-term current use of insulin 11/09/2018     Resolved Ambulatory Problems     Diagnosis Date Noted    S/P BKA (below knee amputation) unilateral 06/30/2014    CHF with left ventricular diastolic dysfunction, NYHA class 2 07/17/2014    Cellulitis 07/17/2014    S/P BKA (below knee amputation) 10/13/2014    Nuclear sclerotic cataract of right eye 01/09/2015    Sepsis 04/06/2015    S/P BKA (below knee amputation) unilateral 09/23/2015    History of right below knee amputation 06/22/2016     ANGEL (dyspnea on exertion) 05/17/2017    Pre-op evaluation 10/08/2018     Past Medical History:   Diagnosis Date    Cancer     Cataract     Chronic kidney disease     Diabetes mellitus     Diabetes mellitus, type 2     Hyperlipidemia     Hypertension          MEDICATIONS:  Current Outpatient Medications:     alfuzosin (UROXATRAL) 10 mg Tb24, Take 1 tablet (10 mg total) by mouth daily with breakfast., Disp: 30 tablet, Rfl: 11    amLODIPine (NORVASC) 10 MG tablet, , Disp: , Rfl: 3    aspirin (ECOTRIN) 81 MG EC tablet, Take 81 mg by mouth once daily., Disp: , Rfl:     atorvastatin (LIPITOR) 80 MG tablet, TAKE 1 TABLET BY MOUTH ONCE DAILY IN THE EVENING, Disp: 90 tablet, Rfl: 0    carvedilol (COREG) 25 MG tablet, TAKE ONE TABLET BY MOUTH TWICE DAILY WITH MEALS, Disp: 180 tablet, Rfl: 3    clotrimazole (LOTRIMIN) 1 % cream, Apply 1 application topically 2 (two) times daily., Disp: , Rfl:     finasteride (PROSCAR) 5 mg tablet, Take 1 tablet (5 mg total) by mouth once daily., Disp: 30 tablet, Rfl: 11    fluocinolone (SYNALAR) 0.01 % external solution, APPLY  SOLUTION TOPICALLY TO SCALP ONCE DAILY IN THE EVENING, Disp: 60 mL, Rfl: 1    fluocinonide 0.05% (LIDEX) 0.05 % cream, APPLY  CREAM TOPICALLY TWICE DAILY, Disp: 30 g, Rfl: 2    furosemide (LASIX) 40 MG tablet, TAKE ONE TABLET BY MOUTH ONCE DAILY (UP  TO  4  TABLETS  A  DAY  EXTRA,  ONLY  AS  NEEDED  FOR  SWELLING), Disp: 120 tablet, Rfl: 11    insulin aspart U-100 (NOVOLOG FLEXPEN U-100 INSULIN) 100 unit/mL (3 mL) InPn pen, Inject 20 Units into the skin 3 (three) times daily., Disp: 15 mL, Rfl: 5    insulin detemir U-100 (LEVEMIR FLEXTOUCH U-100 INSULN) 100 unit/mL (3 mL) SubQ InPn pen, Inject 80 Units into the skin every evening., Disp: 75 mL, Rfl: 3    ketoconazole (NIZORAL) 2 % cream, Apply topically 2 (two) times daily. Apply a thin amount to the whole face, Disp: 60 g, Rfl: 2    ketoconazole (NIZORAL) 2 % shampoo, USE SHAMPOO TOPICALLY ON  "SCALP TWICE A WEEK, Disp: 120 mL, Rfl: 5    lancets (ONETOUCH ULTRASOFT LANCETS) Misc, 1 each by Misc.(Non-Drug; Combo Route) route 3 (three) times daily before meals., Disp: 100 each, Rfl: 11    linaclotide (LINZESS) 145 mcg Cap capsule, Take 1 capsule (145 mcg total) by mouth once daily., Disp: 30 capsule, Rfl: 3    lisinopril (PRINIVIL,ZESTRIL) 40 MG tablet, TAKE 1 TABLET BY MOUTH ONCE DAILY, Disp: 90 tablet, Rfl: 0    meclizine (ANTIVERT) 25 mg tablet, Take 1 tablet (25 mg total) by mouth 3 (three) times daily as needed., Disp: 60 tablet, Rfl: 1    pen needle, diabetic (BD ULTRA-FINE JERRY PEN NEEDLE) 32 gauge x 5/32" Ndle, 1 Device by Misc.(Non-Drug; Combo Route) route 3 (three) times daily., Disp: 100 each, Rfl: 6    polyethylene glycol (GLYCOLAX) 17 gram/dose powder, Take 17 g by mouth once daily., Disp: 1700 g, Rfl: 11    POLYETHYLENE GLYCOL 3350 (MIRALAX ORAL), Take by mouth., Disp: , Rfl:     spironolactone (ALDACTONE) 25 MG tablet, Take 1 tablet (25 mg total) by mouth once daily., Disp: 90 tablet, Rfl: 3    triamcinolone acetonide 0.1% (KENALOG) 0.1 % cream, , Disp: , Rfl:     TRILIPIX 135 mg CpDR, TAKE 1 CAPSULE BY MOUTH EVERY DAY, Disp: 30 capsule, Rfl: 6    vardenafil (LEVITRA) 20 MG tablet, Take 1 tablet (20 mg total) by mouth daily as needed for Erectile Dysfunction., Disp: 15 tablet, Rfl: 5    meloxicam (MOBIC) 15 MG tablet, Take 1 tablet (15 mg total) by mouth once daily., Disp: 30 tablet, Rfl: 3    Current Facility-Administered Medications:     0.9%  NaCl infusion, , Intravenous, Continuous, William Tineo MD, Stopped at 12/20/18 1110    atropine injection 0.2 mg, 0.2 mg, Intravenous, PRN, William Tineo MD    DOBUTamine 500mg in D5W 250mL infusion (premix), 10 mcg/kg/min, Intravenous, Continuous, William Tineo MD, Stopped at 12/20/18 1100    esmolol injection 20 mg, 20 mg, Intravenous, PRN, William Tineo MD, 10 mg at 12/20/18 1113    nitroGLYCERIN SL tablet 0.4 mg, 0.4 " mg, Sublingual, Q5 Min PRN, William Tinoe MD, 0.4 mg at 12/20/18 1106      HEALTH MAINTENANCE:   Health Maintenance   Topic Date Due    Foot Exam  04/05/2019    Influenza Vaccine  08/01/2019    Eye Exam  11/09/2019    Hemoglobin A1c  11/14/2019    Low Dose Statin  05/14/2020    Lipid Panel  05/14/2020    Colonoscopy  06/12/2021    Pneumococcal Vaccine (65+ Low/Medium Risk) (2 of 2 - PPSV23) 07/06/2021    TETANUS VACCINE  03/08/2027    Hepatitis C Screening  Completed       Review of Systems   Constitutional: Negative for appetite change, chills, fatigue, fever, unexpected weight change and weight loss.   HENT: Negative for congestion, ear pain, mouth sores, postnasal drip, rhinorrhea, sinus pressure, sinus pain and sore throat.    Eyes: Negative for blurred vision, photophobia, discharge, redness, itching and visual disturbance.   Respiratory: Negative for cough, chest tightness, shortness of breath and wheezing.    Cardiovascular: Negative for chest pain, palpitations and leg swelling.   Gastrointestinal: Negative for abdominal pain, blood in stool, constipation, diarrhea, nausea and vomiting.   Endocrine: Negative for cold intolerance, heat intolerance, polydipsia, polyphagia and polyuria.   Genitourinary: Negative for difficulty urinating, discharge, dysuria, flank pain, frequency and urgency.   Musculoskeletal: Positive for arthralgias. Negative for back pain, joint swelling, myalgias and neck pain.   Skin: Negative for pallor, rash and wound.   Neurological: Negative for dizziness, tremors, seizures, syncope, speech difficulty, weakness, light-headedness, numbness and headaches.   Hematological: Negative for adenopathy. Does not bruise/bleed easily.   Psychiatric/Behavioral: Negative for agitation, confusion and sleep disturbance. The patient is not nervous/anxious.        Objective:          LABS    CMP  Sodium   Date Value Ref Range Status   05/14/2019 145 136 - 145 mmol/L Final     Potassium    Date Value Ref Range Status   05/14/2019 4.5 3.5 - 5.1 mmol/L Final     Chloride   Date Value Ref Range Status   05/14/2019 106 95 - 110 mmol/L Final     CO2   Date Value Ref Range Status   05/14/2019 28 23 - 29 mmol/L Final     Glucose   Date Value Ref Range Status   05/14/2019 187 (H) 70 - 110 mg/dL Final     BUN, Bld   Date Value Ref Range Status   05/14/2019 19 2 - 20 mg/dL Final     Creatinine   Date Value Ref Range Status   05/14/2019 1.14 0.50 - 1.40 mg/dL Final     Calcium   Date Value Ref Range Status   05/14/2019 9.5 8.7 - 10.5 mg/dL Final     Total Protein   Date Value Ref Range Status   05/14/2019 7.2 6.0 - 8.4 g/dL Final     Albumin   Date Value Ref Range Status   05/14/2019 4.3 3.5 - 5.2 g/dL Final     Total Bilirubin   Date Value Ref Range Status   05/14/2019 0.6 0.1 - 1.0 mg/dL Final     Comment:     For infants and newborns, interpretation of results should be based  on gestational age, weight and in agreement with clinical  observations.  Premature Infant recommended reference ranges:  Up to 24 hours.............<8.0 mg/dL  Up to 48 hours............<12.0 mg/dL  3-5 days..................<15.0 mg/dL  6-29 days.................<15.0 mg/dL       Alkaline Phosphatase   Date Value Ref Range Status   05/14/2019 98 38 - 126 U/L Final     AST   Date Value Ref Range Status   05/14/2019 26 15 - 46 U/L Final     ALT   Date Value Ref Range Status   05/14/2019 24 10 - 44 U/L Final     Anion Gap   Date Value Ref Range Status   05/14/2019 11 8 - 16 mmol/L Final     eGFR if    Date Value Ref Range Status   05/14/2019 >60.0 >60 mL/min/1.73 m^2 Final     eGFR if non    Date Value Ref Range Status   05/14/2019 >60.0 >60 mL/min/1.73 m^2 Final     Comment:     Calculation used to obtain the estimated glomerular filtration  rate (eGFR) is the CKD-EPI equation.          Lab Results   Component Value Date    WBC 8.33 05/14/2019    HGB 13.6 (L) 05/14/2019    HCT 39.7 (L) 05/14/2019    MCV  90 05/14/2019     05/14/2019       Recent Labs   Lab Result Units 05/14/19  1445   HDL mg/dL 24*   Cholesterol mg/dL 102*   Triglycerides mg/dL 91   LDL Cholesterol mg/dL 59.8*   Hdl/Cholesterol Ratio % 23.5   Non-HDL Cholesterol mg/dL 78   Total Cholesterol/HDL Ratio  4.3         A1C:  Recent Labs   Lab Result Units 05/14/19  1445   Hemoglobin A1C % 8.6*         Physical Exam   Constitutional: He is oriented to person, place, and time. He appears well-developed and well-nourished. He does not have a sickly appearance.   HENT:   Head: Normocephalic and atraumatic. Hair is normal.   Right Ear: External ear and ear canal normal. Tympanic membrane is not erythematous and not retracted. No middle ear effusion.   Left Ear: External ear and ear canal normal. Tympanic membrane is not erythematous and not retracted.  No middle ear effusion.   Nose: No mucosal edema or rhinorrhea. Right sinus exhibits no maxillary sinus tenderness and no frontal sinus tenderness. Left sinus exhibits no maxillary sinus tenderness and no frontal sinus tenderness.   Mouth/Throat: Mucous membranes are normal. No oropharyngeal exudate, posterior oropharyngeal edema or posterior oropharyngeal erythema. No tonsillar exudate.   Eyes: Pupils are equal, round, and reactive to light. Conjunctivae, EOM and lids are normal. Right eye exhibits no discharge. Left eye exhibits no discharge. Right conjunctiva is not injected. Left conjunctiva is not injected. No scleral icterus.   Neck: Normal range of motion. Neck supple. No JVD present. No tracheal tenderness and no muscular tenderness present. Carotid bruit is not present. No tracheal deviation present. No thyroid mass and no thyromegaly present.   Cardiovascular: Normal rate, regular rhythm, normal heart sounds and intact distal pulses. Exam reveals no gallop and no friction rub.   No murmur heard.  Pulmonary/Chest: Effort normal and breath sounds normal. No tachypnea. No respiratory distress. He  has no wheezes. He has no rhonchi. He has no rales. He exhibits no tenderness.   Abdominal: Soft. Bowel sounds are normal. He exhibits no distension and no mass. There is no hepatosplenomegaly. There is no tenderness. There is no rebound and no guarding. No hernia.   Musculoskeletal: Normal range of motion. He exhibits no edema or tenderness.        Left ankle: He exhibits swelling. He exhibits normal range of motion.   Amputated right leg below the knee.    Lymphadenopathy:        Head (right side): No submandibular adenopathy present.        Head (left side): No submandibular adenopathy present.     He has no cervical adenopathy.   Neurological: He is alert and oriented to person, place, and time. He has normal strength. He displays no tremor and normal reflexes. No cranial nerve deficit or sensory deficit.   Skin: Skin is warm and dry. No lesion and no rash noted. No cyanosis or erythema. Nails show no clubbing.   Psychiatric: He has a normal mood and affect. His behavior is normal. Judgment normal. His mood appears not anxious. His speech is not rapid and/or pressured. He is not agitated and not aggressive. He does not exhibit a depressed mood.   Vitals reviewed.        Ambika Esctoo   Medical Student      Medical/DELICIA Student   Signed   Encounter Date:  5/14/2019   Creation Time:  5/14/2019  2:35 PM               Subjective:       Patient ID: Brando Melgoza is a 64 y.o. male.     Chief Complaint: Follow-up     HPI  Review of Systems    Objective:   Physical Exam   Cardiovascular:   Pulses:       Dorsalis pedis pulses are 2+ on the left side.   Feet:   Right Foot: amputated  Left Foot:   Protective Sensation: 10 sites tested. 7 sites sensed.    ++++ Left ankle edema prevented ability to palpate posterior tibialis pulse                    Cosigned by: Odilia Vaughan MD at 5/19/2019  4:00 PM   Electronically signed by Ambika Escoto at 5/14/2019  2:38 PM  Electronically signed by Odilia Vaughan MD  at 5/19/2019  4:00 PM       I have reviewed the notes, assessments, and/or procedures performed by Ambika wallace, I concur with her/his documentation of Brando Melgoza.      Assessment and Plan     Problem List Items Addressed This Visit        Cardiac/Vascular    Essential hypertension - bp looks ok. Continue current meds.       HLD (hyperlipidemia) - on atorvastatin. Check labs.       Cardiomyopathy - chronic. He sees cardiology.        Endocrine    Type 2 diabetes mellitus with stage 3 chronic kidney disease, with long-term current use of insulin - Primary - Last HgbA1c was OK at 7.4. Continue insulin. We did foot exam. Check labs.     Relevant Medications    insulin aspart U-100 (NOVOLOG FLEXPEN U-100 INSULIN) 100 unit/mL (3 mL) InPn pen    Other Relevant Orders    Hemoglobin A1c (Completed)    Comprehensive metabolic panel (Completed)    CBC auto differential (Completed)    Lipid panel (Completed)    Microalbumin/creatinine urine ratio (Completed)       Orthopedic    S/P BKA (below knee amputation), right - due tonon-helaing ulcer. Stable.       Rotator cuff disorder, right - I have prescribed meloxicam. Take prn. Follow-up with shoulder doctor.       Chronic right shoulder pain - as above. Follow-up with shoulder docotr.       Other Visit Diagnoses     Neoplasm of uncertain behavior of skin     - refer to dermatology.     Relevant Medications    ketoconazole (NIZORAL) 2 % shampoo    Encounter for diabetic foot exam    - edema and mild numbness. Amputated right leg.     Relevant Medications    insulin aspart U-100 (NOVOLOG FLEXPEN U-100 INSULIN) 100 unit/mL (3 mL) InPn pen          Orders Placed This Encounter    Hemoglobin A1c    Comprehensive metabolic panel    CBC auto differential    Lipid panel    Microalbumin/creatinine urine ratio    insulin aspart U-100 (NOVOLOG FLEXPEN U-100 INSULIN) 100 unit/mL (3 mL) InPn pen    ketoconazole (NIZORAL) 2 % shampoo    meloxicam (MOBIC) 15 MG tablet          Follow-up with me in 6 months.

## 2019-06-03 DIAGNOSIS — E78.5 HYPERLIPIDEMIA: ICD-10-CM

## 2019-06-04 RX ORDER — ATORVASTATIN CALCIUM 80 MG/1
TABLET, FILM COATED ORAL
Qty: 90 TABLET | Refills: 1 | Status: SHIPPED | OUTPATIENT
Start: 2019-06-04 | End: 2020-01-13

## 2019-06-10 ENCOUNTER — TELEPHONE (OUTPATIENT)
Dept: FAMILY MEDICINE | Facility: CLINIC | Age: 64
End: 2019-06-10

## 2019-06-10 NOTE — TELEPHONE ENCOUNTER
Spoke with patient. Pt states that he was calling in to let Dr Vaughan know that he spoke with someone with [employer] [facility] concerning getting a new meter that does not require a finger stick. Pt states the rep states that they would contact the office for any additional information they may need. Informed patient that I would make Dr Vaughan aware and would be looking out for any correspondence.

## 2019-06-10 NOTE — TELEPHONE ENCOUNTER
----- Message from Angelina Thomas sent at 6/10/2019 11:57 AM CDT -----  Contact: [phone]/self  Patient states he requested a diabetic meter, the kind that does not require a finger prick, from  [facility]. Please advise.

## 2019-06-21 ENCOUNTER — PATIENT MESSAGE (OUTPATIENT)
Dept: INTERNAL MEDICINE | Facility: CLINIC | Age: 64
End: 2019-06-21

## 2019-08-13 RX ORDER — LISINOPRIL 40 MG/1
TABLET ORAL
Qty: 90 TABLET | Refills: 0 | Status: SHIPPED | OUTPATIENT
Start: 2019-08-13 | End: 2019-11-03 | Stop reason: SDUPTHER

## 2019-08-30 DIAGNOSIS — E11.9 TYPE 2 DIABETES MELLITUS WITHOUT COMPLICATION: ICD-10-CM

## 2019-09-03 ENCOUNTER — OFFICE VISIT (OUTPATIENT)
Dept: FAMILY MEDICINE | Facility: CLINIC | Age: 64
End: 2019-09-03
Payer: MEDICARE

## 2019-09-03 VITALS
HEART RATE: 54 BPM | OXYGEN SATURATION: 98 % | TEMPERATURE: 98 F | HEIGHT: 69 IN | BODY MASS INDEX: 32.66 KG/M2 | WEIGHT: 220.5 LBS | DIASTOLIC BLOOD PRESSURE: 64 MMHG | SYSTOLIC BLOOD PRESSURE: 132 MMHG

## 2019-09-03 DIAGNOSIS — I10 ESSENTIAL HYPERTENSION: ICD-10-CM

## 2019-09-03 DIAGNOSIS — T50.905A BRADYCARDIA, DRUG INDUCED: ICD-10-CM

## 2019-09-03 DIAGNOSIS — E11.22 TYPE 2 DIABETES MELLITUS WITH STAGE 3 CHRONIC KIDNEY DISEASE, WITH LONG-TERM CURRENT USE OF INSULIN: ICD-10-CM

## 2019-09-03 DIAGNOSIS — E78.2 MIXED HYPERLIPIDEMIA: ICD-10-CM

## 2019-09-03 DIAGNOSIS — Z79.4 TYPE 2 DIABETES MELLITUS WITH STAGE 3 CHRONIC KIDNEY DISEASE, WITH LONG-TERM CURRENT USE OF INSULIN: ICD-10-CM

## 2019-09-03 DIAGNOSIS — Z89.511 STATUS POST BELOW KNEE AMPUTATION OF RIGHT LOWER EXTREMITY: ICD-10-CM

## 2019-09-03 DIAGNOSIS — I73.9 PAD (PERIPHERAL ARTERY DISEASE): ICD-10-CM

## 2019-09-03 DIAGNOSIS — R00.1 BRADYCARDIA, DRUG INDUCED: ICD-10-CM

## 2019-09-03 DIAGNOSIS — N18.30 TYPE 2 DIABETES MELLITUS WITH STAGE 3 CHRONIC KIDNEY DISEASE, WITH LONG-TERM CURRENT USE OF INSULIN: ICD-10-CM

## 2019-09-03 DIAGNOSIS — N18.30 CKD (CHRONIC KIDNEY DISEASE) STAGE 3, GFR 30-59 ML/MIN: ICD-10-CM

## 2019-09-03 DIAGNOSIS — D64.9 ANEMIA, UNSPECIFIED TYPE: ICD-10-CM

## 2019-09-03 PROCEDURE — 99999 PR PBB SHADOW E&M-EST. PATIENT-LVL IV: ICD-10-PCS | Mod: PBBFAC,,, | Performed by: INTERNAL MEDICINE

## 2019-09-03 PROCEDURE — 3045F PR MOST RECENT HEMOGLOBIN A1C LEVEL 7.0-9.0%: ICD-10-PCS | Mod: CPTII,S$GLB,, | Performed by: INTERNAL MEDICINE

## 2019-09-03 PROCEDURE — 99999 PR PBB SHADOW E&M-EST. PATIENT-LVL IV: CPT | Mod: PBBFAC,,, | Performed by: INTERNAL MEDICINE

## 2019-09-03 PROCEDURE — 3075F SYST BP GE 130 - 139MM HG: CPT | Mod: CPTII,S$GLB,, | Performed by: INTERNAL MEDICINE

## 2019-09-03 PROCEDURE — 99499 UNLISTED E&M SERVICE: CPT | Mod: S$GLB,,, | Performed by: INTERNAL MEDICINE

## 2019-09-03 PROCEDURE — 3078F DIAST BP <80 MM HG: CPT | Mod: CPTII,S$GLB,, | Performed by: INTERNAL MEDICINE

## 2019-09-03 PROCEDURE — 99499 RISK ADDL DX/OHS AUDIT: ICD-10-PCS | Mod: S$GLB,,, | Performed by: INTERNAL MEDICINE

## 2019-09-03 PROCEDURE — 1101F PR PT FALLS ASSESS DOC 0-1 FALLS W/OUT INJ PAST YR: ICD-10-PCS | Mod: CPTII,S$GLB,, | Performed by: INTERNAL MEDICINE

## 2019-09-03 PROCEDURE — 99214 OFFICE O/P EST MOD 30 MIN: CPT | Mod: S$GLB,,, | Performed by: INTERNAL MEDICINE

## 2019-09-03 PROCEDURE — 3075F PR MOST RECENT SYSTOLIC BLOOD PRESS GE 130-139MM HG: ICD-10-PCS | Mod: CPTII,S$GLB,, | Performed by: INTERNAL MEDICINE

## 2019-09-03 PROCEDURE — 3078F PR MOST RECENT DIASTOLIC BLOOD PRESSURE < 80 MM HG: ICD-10-PCS | Mod: CPTII,S$GLB,, | Performed by: INTERNAL MEDICINE

## 2019-09-03 PROCEDURE — 3045F PR MOST RECENT HEMOGLOBIN A1C LEVEL 7.0-9.0%: CPT | Mod: CPTII,S$GLB,, | Performed by: INTERNAL MEDICINE

## 2019-09-03 PROCEDURE — 1101F PT FALLS ASSESS-DOCD LE1/YR: CPT | Mod: CPTII,S$GLB,, | Performed by: INTERNAL MEDICINE

## 2019-09-03 PROCEDURE — 99214 PR OFFICE/OUTPT VISIT, EST, LEVL IV, 30-39 MIN: ICD-10-PCS | Mod: S$GLB,,, | Performed by: INTERNAL MEDICINE

## 2019-09-03 NOTE — PROGRESS NOTES
Subjective:       Patient ID: Brando Melgoza is a 64 y.o. male.    Chief Complaint: Follow-up     I have reviewed the [facility],  and  for this patient. He  has a past medical history of Cancer, Cataract, Chronic kidney disease, Diabetes mellitus, Diabetes mellitus, type 2, Hyperlipidemia, and Hypertension.  The patient presents today for follow-up of chronic conditions.  The patient had elevated hemoglobin A1c last time of 8.6.  He reports that he has not checked his sugars in the last 4 days or so.  Prior to that, his morning sugars were in the 200s and his evening sugars were around 120.  He reports that he is taking 80 units of Lantus daily and 25 units of fast acting insulin prior to meals.  He does occasionally have low sugar episodes, but this usually happens when he does not eat.  He has had an amputation of his right leg.  His left leg is routinely swollen during the day.  He reports that he does not check his blood pressure at home very often but when he does it seems to be okay.  He has had all of his medicines today.  His blood pressure was initially 148/72.  When I rechecked it, I got 132/64.  He has symptoms of paresthesias shows in his big toe on the left.  He is hoping to have rotator cuff surgery in the winter and I explained to him that he will need to get his sugars under control.  The other labs he had done in May showed that he is mildly anemic with a hematocrit of 35.7.  He is due for a flu shot, but we do not have the 65+ flu shot yet.  He has no other complaints.  He denies chest pain, shortness of breath, nausea, and dizziness.    Active Ambulatory Problems     Diagnosis Date Noted    Gait abnormality 07/08/2014    Right leg weakness 07/08/2014    Phantom limb pain 07/08/2014    Essential hypertension 07/17/2014    HLD (hyperlipidemia) 07/17/2014    DM (diabetes mellitus) 07/17/2014    PAD (peripheral artery disease) 07/17/2014    CKD (chronic kidney disease) stage 3, GFR 30-59 ml/min  07/17/2014    S/P BKA (below knee amputation) 07/17/2014    Obesity, unspecified obesity severity, unspecified obesity type 07/17/2014    Sarcoidosis 07/17/2014    Colon polyps 10/23/2014    Hemorrhoids, internal 10/23/2014    Personal history of colon cancer 10/23/2014    Proliferative diabetic retinopathy 01/09/2015    Senile nuclear sclerosis 01/14/2015    Orchitis and epididymitis 04/08/2015    Erectile dysfunction 07/16/2015    BPH (benign prostatic hyperplasia) 07/16/2015    Prosthesis fitting 07/16/2015    Impingement syndrome of left shoulder 09/23/2015    Left shoulder pain 06/22/2016    (HFpEF) heart failure with preserved ejection fraction 07/15/2016    Cardiomyopathy 07/15/2016    LVH (left ventricular hypertrophy) 07/15/2016    Type 2 diabetes mellitus with stage 3 chronic kidney disease, with long-term current use of insulin 05/17/2017    Chronic cough 07/18/2017    History of colon cancer 05/22/2018    S/P BKA (below knee amputation), right 10/08/2018    Rotator cuff disorder, right 10/08/2018    Chronic right shoulder pain 11/08/2018    Uncontrolled type 2 diabetes mellitus with both eyes affected by proliferative retinopathy and macular edema, with long-term current use of insulin 11/09/2018     Resolved Ambulatory Problems     Diagnosis Date Noted    S/P BKA (below knee amputation) unilateral 06/30/2014    CHF with left ventricular diastolic dysfunction, NYHA class 2 07/17/2014    Cellulitis 07/17/2014    S/P BKA (below knee amputation) 10/13/2014    Nuclear sclerotic cataract of right eye 01/09/2015    Sepsis 04/06/2015    S/P BKA (below knee amputation) unilateral 09/23/2015    History of right below knee amputation 06/22/2016    ANGEL (dyspnea on exertion) 05/17/2017    Pre-op evaluation 10/08/2018     Past Medical History:   Diagnosis Date    Cancer     Cataract     Chronic kidney disease     Diabetes mellitus     Diabetes mellitus, type 2      Hyperlipidemia     Hypertension          MEDICATIONS:  Current Outpatient Medications:     alfuzosin (UROXATRAL) 10 mg Tb24, Take 1 tablet (10 mg total) by mouth daily with breakfast., Disp: 30 tablet, Rfl: 11    amLODIPine (NORVASC) 10 MG tablet, , Disp: , Rfl: 3    aspirin (ECOTRIN) 81 MG EC tablet, Take 81 mg by mouth once daily., Disp: , Rfl:     atorvastatin (LIPITOR) 80 MG tablet, TAKE 1 TABLET BY MOUTH ONCE DAILY IN THE EVENING, Disp: 90 tablet, Rfl: 1    carvedilol (COREG) 25 MG tablet, TAKE 1 TABLET BY MOUTH TWICE DAILY WITH MEALS, Disp: 180 tablet, Rfl: 3    clotrimazole (LOTRIMIN) 1 % cream, Apply 1 application topically 2 (two) times daily., Disp: , Rfl:     finasteride (PROSCAR) 5 mg tablet, Take 1 tablet (5 mg total) by mouth once daily., Disp: 30 tablet, Rfl: 11    fluocinolone (SYNALAR) 0.01 % external solution, APPLY  SOLUTION TOPICALLY TO SCALP ONCE DAILY IN THE EVENING, Disp: 60 mL, Rfl: 1    fluocinonide 0.05% (LIDEX) 0.05 % cream, APPLY  CREAM TOPICALLY TWICE DAILY, Disp: 30 g, Rfl: 2    furosemide (LASIX) 40 MG tablet, TAKE ONE TABLET BY MOUTH ONCE DAILY (UP  TO  4  TABLETS  A  DAY  EXTRA,  ONLY  AS  NEEDED  FOR  SWELLING), Disp: 120 tablet, Rfl: 11    insulin aspart U-100 (NOVOLOG FLEXPEN U-100 INSULIN) 100 unit/mL (3 mL) InPn pen, Inject 20 Units into the skin 3 (three) times daily., Disp: 15 mL, Rfl: 5    insulin detemir U-100 (LEVEMIR FLEXTOUCH U-100 INSULN) 100 unit/mL (3 mL) SubQ InPn pen, Inject 80 Units into the skin every evening., Disp: 75 mL, Rfl: 3    ketoconazole (NIZORAL) 2 % cream, APPLY A THIN LAYER TOPICALLY TO THE WHOLE FACE TWICE DAILY, Disp: 60 g, Rfl: 2    ketoconazole (NIZORAL) 2 % shampoo, USE SHAMPOO TOPICALLY ON SCALP TWICE A WEEK, Disp: 120 mL, Rfl: 5    lancets (ONETOUCH ULTRASOFT LANCETS) Misc, 1 each by Misc.(Non-Drug; Combo Route) route 3 (three) times daily before meals., Disp: 100 each, Rfl: 11    linaclotide (LINZESS) 145 mcg Cap capsule, Take 1  "capsule (145 mcg total) by mouth once daily., Disp: 30 capsule, Rfl: 3    lisinopril (PRINIVIL,ZESTRIL) 40 MG tablet, TAKE 1 TABLET BY MOUTH ONCE DAILY, Disp: 90 tablet, Rfl: 0    meclizine (ANTIVERT) 25 mg tablet, Take 1 tablet (25 mg total) by mouth 3 (three) times daily as needed., Disp: 60 tablet, Rfl: 1    meloxicam (MOBIC) 15 MG tablet, Take 1 tablet (15 mg total) by mouth once daily., Disp: 30 tablet, Rfl: 3    pen needle, diabetic (BD ULTRA-FINE JERRY PEN NEEDLE) 32 gauge x 5/32" Ndle, 1 Device by Misc.(Non-Drug; Combo Route) route 3 (three) times daily., Disp: 100 each, Rfl: 6    polyethylene glycol (GLYCOLAX) 17 gram/dose powder, Take 17 g by mouth once daily., Disp: 1700 g, Rfl: 11    POLYETHYLENE GLYCOL 3350 (MIRALAX ORAL), Take by mouth., Disp: , Rfl:     spironolactone (ALDACTONE) 25 MG tablet, Take 1 tablet (25 mg total) by mouth once daily., Disp: 90 tablet, Rfl: 3    triamcinolone acetonide 0.1% (KENALOG) 0.1 % cream, Mix in a 50:50 portion with your Ketoconazole cream and apply nightly to the affected areas., Disp: 45 g, Rfl: 1    TRILIPIX 135 mg CpDR, TAKE 1 CAPSULE BY MOUTH EVERY DAY, Disp: 30 capsule, Rfl: 6    vardenafil (LEVITRA) 20 MG tablet, Take 1 tablet (20 mg total) by mouth daily as needed for Erectile Dysfunction., Disp: 15 tablet, Rfl: 5    Current Facility-Administered Medications:     0.9%  NaCl infusion, , Intravenous, Continuous, William Tineo MD, Stopped at 12/20/18 1110    atropine injection 0.2 mg, 0.2 mg, Intravenous, PRN, William Tineo MD    DOBUTamine 500mg in D5W 250mL infusion (premix), 10 mcg/kg/min, Intravenous, Continuous, William Tineo MD, Stopped at 12/20/18 1100    esmolol injection 20 mg, 20 mg, Intravenous, PRN, William Tineo MD, 10 mg at 12/20/18 1113    nitroGLYCERIN SL tablet 0.4 mg, 0.4 mg, Sublingual, Q5 Min PRN, William Tineo MD, 0.4 mg at 12/20/18 1106      HEALTH MAINTENANCE:   Health Maintenance   Topic Date Due    Hemoglobin " A1c  08/14/2019    Eye Exam  11/09/2019    Foot Exam  05/14/2020    Lipid Panel  05/14/2020    Low Dose Statin  09/03/2020    Colonoscopy  06/12/2021    Pneumococcal Vaccine (65+ Low/Medium Risk) (2 of 2 - PPSV23) 07/06/2021    TETANUS VACCINE  03/08/2027    Hepatitis C Screening  Completed       Review of Systems   Constitutional: Negative for chills, fatigue and fever.   HENT: Negative for congestion, ear discharge, ear pain, rhinorrhea and sore throat.    Eyes: Negative for discharge, redness and itching.   Respiratory: Negative for cough, chest tightness, shortness of breath and wheezing.    Cardiovascular: Negative for chest pain, palpitations and leg swelling.   Gastrointestinal: Negative for abdominal pain, constipation, diarrhea, nausea and vomiting.   Endocrine: Negative for cold intolerance and heat intolerance.   Genitourinary: Negative for dysuria, flank pain, frequency and hematuria.   Musculoskeletal: Negative for arthralgias, back pain, joint swelling and myalgias.   Skin: Negative for color change and rash.   Neurological: Negative for dizziness, tremors, numbness and headaches.   Psychiatric/Behavioral: Negative for dysphoric mood and sleep disturbance. The patient is not nervous/anxious.        Objective:          A1C:  Recent Labs   Lab 10/31/16  0833 03/01/17  1008 07/03/17  0853 11/10/17  0707 02/15/18  0645 06/18/18  0727 11/08/18  0903 05/14/19  1445   Hemoglobin A1C 8.9 H 8.0 H 8.4 H 7.6 H 7.8 H 7.2 H 7.4 H 8.6 H     CBC:  Recent Labs   Lab 12/06/16  1217 09/05/17  1127 11/10/17  0707 03/01/18  1004 09/17/18  0803 03/10/19  0923 05/14/19  1445   WBC 7.10 6.97 6.44 7.08 6.47 5.43 8.33   RBC 4.67 4.53 L 4.65 4.66 4.30 L 4.63 4.40 L   Hemoglobin 13.8 L 13.8 L 14.4 14.1 13.3 L 14.2 13.6 L   Hematocrit 42.8 40.4 42.0 42.3 39.8 L 43.0 39.7 L   Platelets 281 191 212 225 189 205 220   Mean Corpuscular Volume 92 89 90 91 93 93 90   Mean Corpuscular Hemoglobin 29.6 30.5 31.0 30.3 30.9 30.7 30.9    Mean Corpuscular Hemoglobin Conc 32.3 34.2 34.3 33.3 33.4 33.0 34.3     CMP:  Recent Labs   Lab 10/31/16  0833  03/01/17  1008 07/03/17  0853  11/10/17  0707 02/15/18  0645  06/18/18  0727  05/14/19  1445   Glucose 176 H   < > 177 H 266 H   < > 238 H 236 H   < > 182 H   < > 187 H   Calcium 9.2   < > 9.3 10.0   < > 9.3 9.6   < > 9.6   < > 9.5   Albumin 4.2   < > 4.3 4.0   < > 4.3 3.9   < > 3.9   < > 4.3   Total Protein 7.2  --  7.3 7.0  --  7.3 6.9  --  7.1  --  7.2   Sodium 141   < > 140 140   < > 142 140   < > 145   < > 145   Potassium 5.3 H   < > 4.0 5.0   < > 4.2 4.7   < > 4.3   < > 4.5   CO2 26   < > 26 28   < > 28 28   < > 26   < > 28   Chloride 108   < > 108 104   < > 106 106   < > 108   < > 106   BUN, Bld 31 H   < > 25 H 35 H   < > 23 25 H   < > 22   < > 19   Creatinine 2.1 H   < > 1.4 1.4   < > 1.3 1.4   < > 1.5 H   < > 1.14   Alkaline Phosphatase 54 L  --  55 70  --  84 75  --  97  --  98   ALT 19  --  28 17  --  20 16  --  20  --  24   AST 17  --  25 17  --  23 19  --  18  --  26   Total Bilirubin 0.8  --  0.8 1.0  --  0.8 0.7  --  0.5  --  0.6    < > = values in this interval not displayed.     LIPIDS:  Recent Labs   Lab 10/31/16  0833 12/06/16  1217 11/10/17  0707 11/08/18  0903 05/14/19  1445   HDL 20 L 27 L 23 L 23 L 24 L   Cholesterol 101 L 106 L 99 L 103 L 102 L   Triglycerides 108 62 116 116 91   LDL Cholesterol 59.4 L 66.6 52.8 L 56.8 L 59.8 L   Hdl/Cholesterol Ratio 19.8 L 25.5 23.2 22.3 23.5   Non-HDL Cholesterol 81 79 76 80 78   Total Cholesterol/HDL Ratio 5.1 H 3.9 4.3 4.5 4.3     TSH:            Physical Exam   Constitutional: He appears well-developed and well-nourished. He does not have a sickly appearance.   HENT:   Head: Normocephalic and atraumatic.   Right Ear: External ear normal. Tympanic membrane is not erythematous. No middle ear effusion.   Left Ear: External ear normal. Tympanic membrane is not erythematous.  No middle ear effusion.   Nose: No rhinorrhea. Right sinus exhibits no  maxillary sinus tenderness and no frontal sinus tenderness. Left sinus exhibits no maxillary sinus tenderness and no frontal sinus tenderness.   Mouth/Throat: No posterior oropharyngeal edema or posterior oropharyngeal erythema. No tonsillar exudate.   Eyes: Pupils are equal, round, and reactive to light. Conjunctivae and EOM are normal. Right eye exhibits no discharge. Left eye exhibits no discharge. Right conjunctiva is not injected. Left conjunctiva is not injected.   Neck: No thyromegaly present.   Cardiovascular: Regular rhythm, normal heart sounds and intact distal pulses. Bradycardia present.   No murmur heard.  Pulmonary/Chest: Effort normal and breath sounds normal. He has no wheezes. He has no rhonchi. He has no rales.   Abdominal: Bowel sounds are normal. He exhibits no distension. There is no tenderness.   Musculoskeletal: He exhibits no edema or tenderness.        Left ankle: He exhibits swelling. He exhibits normal range of motion.   Amputation left leg.    Lymphadenopathy:     He has no cervical adenopathy.   Neurological: He displays normal reflexes. No cranial nerve deficit.   Skin: Skin is warm and dry. No lesion and no rash noted.   Psychiatric: He has a normal mood and affect. His behavior is normal. His mood appears not anxious. His speech is not rapid and/or pressured. He is not agitated and not aggressive. He does not exhibit a depressed mood.             Assessment and Plan:     Problem List Items Addressed This Visit        Ophtho    Uncontrolled type 2 diabetes mellitus with both eyes affected by proliferative retinopathy and macular edema, with long-term current use of insulin - Primary - I have pre ordered labs for next time, and ordered labs for today.  We will consider adjusting his insulin based on what his hemoglobin A1c shows.  I stressed the importance of eating at least twice a day.    Relevant Orders    Hemoglobin A1c    Comprehensive metabolic panel    CBC auto differential     CBC auto differential    Comprehensive metabolic panel    Hemoglobin A1c    Lipid panel    Microalbumin/creatinine urine ratio       Cardiac/Vascular    Essential hypertension - BP looks good. Continue current medications. We will check labs. Continue to work on diet to reduce salt and exercise 3 times a week for 30 minutes a day.      HLD (hyperlipidemia) - He is on atorvastatin. Stable.       PAD (peripheral artery disease) - control DM, BP and cholesterol. On aspirin daily       Renal/    CKD (chronic kidney disease) stage 3, GFR 30-59 ml/min - check labs.        Endocrine    Type 2 diabetes mellitus with stage 3 chronic kidney disease, with long-term current use of insulin - as above. Check labs. Try to get sugars controlled.        Orthopedic    S/P BKA (below knee amputation) - chronic. Stable.       Other Visit Diagnoses     Anemia, unspecified type    - uncertain cause. Check labs.       Bradycardia, drug induced    - probably due to BP meds. Stable.           Orders Placed This Encounter    Hemoglobin A1c    Comprehensive metabolic panel    CBC auto differential    CBC auto differential    Comprehensive metabolic panel    Hemoglobin A1c    Lipid panel    Microalbumin/creatinine urine ratio         Follow-up with me in 3 months.       Muriel Vaughan MD,  FACP  Internal Medicine

## 2019-09-03 NOTE — PATIENT INSTRUCTIONS
We have reviewed your prescription medications. BP looks better. Continue current medicines. You have not been checking sugars lately, but it sounds like they were still high. Be sure to eat at least 2 times a day. We will adjust insulin based on new HgbA1c. Follow-up in 3 months and get labs prior to the visit.

## 2019-10-28 RX ORDER — AMLODIPINE BESYLATE 10 MG/1
10 TABLET ORAL DAILY
Qty: 90 TABLET | Refills: 0 | Status: SHIPPED | OUTPATIENT
Start: 2019-10-28 | End: 2020-01-20

## 2019-10-28 RX ORDER — ALFUZOSIN HYDROCHLORIDE 10 MG/1
10 TABLET, EXTENDED RELEASE ORAL
Qty: 90 TABLET | Refills: 0 | OUTPATIENT
Start: 2019-10-28 | End: 2020-10-27

## 2019-10-28 NOTE — TELEPHONE ENCOUNTER
----- Message from Lien Reyna sent at 10/28/2019 10:20 AM CDT -----  Contact: patient   2nd attempt- Patient called because he needs medication to be sent to the pharmacy at [facility] [address] - [place], [place] - [address] . He stated that Dr. Vaughan has never prescribed these medications but are daily medications he takes.  His past Primary doctor prescribed them in the past.     Amlodipine 10 mg - 1Xday- 90 day tablets  Alfuzosin 10 mg- 1Xday- 90 day tablets    He is currently out of these medications.     Please call [phone] to discuss today.

## 2019-10-31 ENCOUNTER — TELEPHONE (OUTPATIENT)
Dept: FAMILY MEDICINE | Facility: CLINIC | Age: 64
End: 2019-10-31

## 2019-10-31 NOTE — TELEPHONE ENCOUNTER
----- Message from Odilia Vaughan MD sent at 10/31/2019  9:36 AM CDT -----  HgbA1c is a little better at 8.0. Please work on diet. I recommend that you increase levemir to 85 units a day. The blood chemistries, kidney function test  and liver function tests were within normal range.  Blood counts were OK.

## 2019-10-31 NOTE — TELEPHONE ENCOUNTER
----- Message from Alba Rodriguez sent at 10/31/2019 11:40 AM CDT -----  Contact: self [phone]  Patient called in returning your call. Please advise.

## 2019-10-31 NOTE — TELEPHONE ENCOUNTER
----- Message from Ruthann Suresh sent at 10/31/2019  1:56 PM CDT -----  Contact: [phone]/self  Patient called in returning your call. Please advise.

## 2019-11-04 ENCOUNTER — TELEPHONE (OUTPATIENT)
Dept: FAMILY MEDICINE | Facility: CLINIC | Age: 64
End: 2019-11-04

## 2019-11-04 RX ORDER — LISINOPRIL 40 MG/1
TABLET ORAL
Qty: 90 TABLET | Refills: 0 | Status: SHIPPED | OUTPATIENT
Start: 2019-11-04 | End: 2020-02-11

## 2019-11-04 RX ORDER — PEN NEEDLE, DIABETIC 31 GX5/16"
NEEDLE, DISPOSABLE MISCELLANEOUS
Qty: 100 EACH | Refills: 6 | Status: SHIPPED | OUTPATIENT
Start: 2019-11-04 | End: 2020-10-21

## 2019-11-04 NOTE — TELEPHONE ENCOUNTER
Spoke with patient. Pt calling for lab results. Reviewed lab results and Dr Vaughan recommendations. Pt verbalizes understanding.

## 2019-11-04 NOTE — TELEPHONE ENCOUNTER
----- Message from Joyce Lr sent at 11/4/2019  9:10 AM CST -----  Contact: self  Patient is requesting a callback with test results. Please advise.

## 2019-11-04 NOTE — TELEPHONE ENCOUNTER
----- Message from Davina Snider sent at 11/4/2019  9:53 AM CST -----  Contact: Self  Pt is returning your call.    He can be reached at [phone].    Thank you.

## 2019-11-25 ENCOUNTER — PATIENT OUTREACH (OUTPATIENT)
Dept: ADMINISTRATIVE | Facility: HOSPITAL | Age: 64
End: 2019-11-25

## 2019-12-02 ENCOUNTER — TELEPHONE (OUTPATIENT)
Dept: FAMILY MEDICINE | Facility: CLINIC | Age: 64
End: 2019-12-02

## 2019-12-02 NOTE — TELEPHONE ENCOUNTER
----- Message from Ruthann Suresh sent at 12/2/2019  3:15 PM CST -----  Contact: [phone]/self  Patient requesting to speak with you regarding getting refill insulin medication. Please advise.

## 2019-12-02 NOTE — TELEPHONE ENCOUNTER
----- Message from Angelina Thomas sent at 12/2/2019  1:49 PM CST -----  Contact: [phone] pt  Patient states he is returning your call. Please advise.

## 2019-12-02 NOTE — TELEPHONE ENCOUNTER
----- Message from Ruthann Suresh sent at 12/2/2019 12:49 PM CST -----  Contact: [phone]/self  Patient called in returning your call. Please advise.

## 2019-12-02 NOTE — TELEPHONE ENCOUNTER
----- Message from Phillip Shea sent at 12/2/2019  9:50 AM CST -----  Contact: [phone]/ self   Patient requesting to speak with you regarding prescription a prescription he needs, patient did not state or name the medication. Please call.

## 2019-12-09 ENCOUNTER — OFFICE VISIT (OUTPATIENT)
Dept: FAMILY MEDICINE | Facility: CLINIC | Age: 64
End: 2019-12-09
Payer: MEDICARE

## 2019-12-09 ENCOUNTER — CLINICAL SUPPORT (OUTPATIENT)
Dept: FAMILY MEDICINE | Facility: CLINIC | Age: 64
End: 2019-12-09
Attending: INTERNAL MEDICINE
Payer: MEDICARE

## 2019-12-09 VITALS
WEIGHT: 216.88 LBS | BODY MASS INDEX: 32.87 KG/M2 | SYSTOLIC BLOOD PRESSURE: 139 MMHG | DIASTOLIC BLOOD PRESSURE: 60 MMHG | HEART RATE: 61 BPM | HEIGHT: 68 IN | TEMPERATURE: 98 F | OXYGEN SATURATION: 96 %

## 2019-12-09 DIAGNOSIS — G89.29 CHRONIC RIGHT SHOULDER PAIN: ICD-10-CM

## 2019-12-09 DIAGNOSIS — E34.9 NEUROPATHY ASSOCIATED WITH ENDOCRINE DISORDER: ICD-10-CM

## 2019-12-09 DIAGNOSIS — Z23 NEED FOR INFLUENZA VACCINATION: Primary | ICD-10-CM

## 2019-12-09 DIAGNOSIS — I42.9 CARDIOMYOPATHY, UNSPECIFIED TYPE: ICD-10-CM

## 2019-12-09 DIAGNOSIS — I73.9 PAD (PERIPHERAL ARTERY DISEASE): ICD-10-CM

## 2019-12-09 DIAGNOSIS — Z85.038 PERSONAL HISTORY OF COLON CANCER: ICD-10-CM

## 2019-12-09 DIAGNOSIS — E78.2 MIXED HYPERLIPIDEMIA: ICD-10-CM

## 2019-12-09 DIAGNOSIS — Z79.4 TYPE 2 DIABETES MELLITUS WITHOUT COMPLICATION, WITH LONG-TERM CURRENT USE OF INSULIN: ICD-10-CM

## 2019-12-09 DIAGNOSIS — I10 ESSENTIAL HYPERTENSION: ICD-10-CM

## 2019-12-09 DIAGNOSIS — Z89.511 S/P BKA (BELOW KNEE AMPUTATION), RIGHT: ICD-10-CM

## 2019-12-09 DIAGNOSIS — N18.30 CKD (CHRONIC KIDNEY DISEASE) STAGE 3, GFR 30-59 ML/MIN: ICD-10-CM

## 2019-12-09 DIAGNOSIS — G63 NEUROPATHY ASSOCIATED WITH ENDOCRINE DISORDER: ICD-10-CM

## 2019-12-09 DIAGNOSIS — E11.9 TYPE 2 DIABETES MELLITUS WITHOUT COMPLICATION, WITH LONG-TERM CURRENT USE OF INSULIN: ICD-10-CM

## 2019-12-09 DIAGNOSIS — E11.3593 PROLIFERATIVE DIABETIC RETINOPATHY OF BOTH EYES WITHOUT MACULAR EDEMA ASSOCIATED WITH TYPE 2 DIABETES MELLITUS: ICD-10-CM

## 2019-12-09 DIAGNOSIS — M25.511 CHRONIC RIGHT SHOULDER PAIN: ICD-10-CM

## 2019-12-09 PROCEDURE — 99214 OFFICE O/P EST MOD 30 MIN: CPT | Mod: 25,S$GLB,, | Performed by: INTERNAL MEDICINE

## 2019-12-09 PROCEDURE — 1159F MED LIST DOCD IN RCRD: CPT | Mod: S$GLB,,, | Performed by: INTERNAL MEDICINE

## 2019-12-09 PROCEDURE — 99214 PR OFFICE/OUTPT VISIT, EST, LEVL IV, 30-39 MIN: ICD-10-PCS | Mod: 25,S$GLB,, | Performed by: INTERNAL MEDICINE

## 2019-12-09 PROCEDURE — 1101F PR PT FALLS ASSESS DOC 0-1 FALLS W/OUT INJ PAST YR: ICD-10-PCS | Mod: CPTII,S$GLB,, | Performed by: INTERNAL MEDICINE

## 2019-12-09 PROCEDURE — 92250 FUNDUS PHOTOGRAPHY W/I&R: CPT | Mod: 26,S$GLB,, | Performed by: OPTOMETRIST

## 2019-12-09 PROCEDURE — 99999 PR PBB SHADOW E&M-EST. PATIENT-LVL IV: CPT | Mod: PBBFAC,,, | Performed by: INTERNAL MEDICINE

## 2019-12-09 PROCEDURE — 99499 UNLISTED E&M SERVICE: CPT | Mod: S$GLB,,, | Performed by: INTERNAL MEDICINE

## 2019-12-09 PROCEDURE — 3075F SYST BP GE 130 - 139MM HG: CPT | Mod: CPTII,S$GLB,, | Performed by: INTERNAL MEDICINE

## 2019-12-09 PROCEDURE — 90662 FLU VACCINE - HIGH DOSE (65+) PRESERVATIVE FREE IM: ICD-10-PCS | Mod: S$GLB,,, | Performed by: INTERNAL MEDICINE

## 2019-12-09 PROCEDURE — 2022F DILAT RTA XM EVC RTNOPTHY: CPT | Mod: S$GLB,,, | Performed by: OPTOMETRIST

## 2019-12-09 PROCEDURE — 3075F PR MOST RECENT SYSTOLIC BLOOD PRESS GE 130-139MM HG: ICD-10-PCS | Mod: CPTII,S$GLB,, | Performed by: INTERNAL MEDICINE

## 2019-12-09 PROCEDURE — 99999 PR PBB SHADOW E&M-EST. PATIENT-LVL IV: ICD-10-PCS | Mod: PBBFAC,,, | Performed by: INTERNAL MEDICINE

## 2019-12-09 PROCEDURE — 1101F PT FALLS ASSESS-DOCD LE1/YR: CPT | Mod: CPTII,S$GLB,, | Performed by: INTERNAL MEDICINE

## 2019-12-09 PROCEDURE — 1126F AMNT PAIN NOTED NONE PRSNT: CPT | Mod: S$GLB,,, | Performed by: INTERNAL MEDICINE

## 2019-12-09 PROCEDURE — 99499 RISK ADDL DX/OHS AUDIT: ICD-10-PCS | Mod: S$GLB,,, | Performed by: INTERNAL MEDICINE

## 2019-12-09 PROCEDURE — G0008 FLU VACCINE - HIGH DOSE (65+) PRESERVATIVE FREE IM: ICD-10-PCS | Mod: S$GLB,,, | Performed by: INTERNAL MEDICINE

## 2019-12-09 PROCEDURE — 90662 IIV NO PRSV INCREASED AG IM: CPT | Mod: S$GLB,,, | Performed by: INTERNAL MEDICINE

## 2019-12-09 PROCEDURE — 1159F PR MEDICATION LIST DOCUMENTED IN MEDICAL RECORD: ICD-10-PCS | Mod: S$GLB,,, | Performed by: INTERNAL MEDICINE

## 2019-12-09 PROCEDURE — 92250 DIABETIC EYE SCREENING PHOTO: ICD-10-PCS | Mod: 26,S$GLB,, | Performed by: OPTOMETRIST

## 2019-12-09 PROCEDURE — 2022F DIABETIC EYE SCREENING PHOTO: ICD-10-PCS | Mod: S$GLB,,, | Performed by: OPTOMETRIST

## 2019-12-09 PROCEDURE — 2024F PR 7 FIELD PHOTOS WITH INTERP/ REVIEW: ICD-10-PCS | Mod: S$GLB,,, | Performed by: INTERNAL MEDICINE

## 2019-12-09 PROCEDURE — 1126F PR PAIN SEVERITY QUANTIFIED, NO PAIN PRESENT: ICD-10-PCS | Mod: S$GLB,,, | Performed by: INTERNAL MEDICINE

## 2019-12-09 PROCEDURE — 3078F DIAST BP <80 MM HG: CPT | Mod: CPTII,S$GLB,, | Performed by: INTERNAL MEDICINE

## 2019-12-09 PROCEDURE — 2024F 7 FLD RTA PHOTO EVC RTNOPTHY: CPT | Mod: S$GLB,,, | Performed by: INTERNAL MEDICINE

## 2019-12-09 PROCEDURE — G0008 ADMIN INFLUENZA VIRUS VAC: HCPCS | Mod: S$GLB,,, | Performed by: INTERNAL MEDICINE

## 2019-12-09 PROCEDURE — 3078F PR MOST RECENT DIASTOLIC BLOOD PRESSURE < 80 MM HG: ICD-10-PCS | Mod: CPTII,S$GLB,, | Performed by: INTERNAL MEDICINE

## 2019-12-09 NOTE — PROGRESS NOTES
Brando Melgoza is a 64 y.o. male here for a diabetic eye screening with non-dilated fundus photos per Dr Vaughan.    Patient cooperative?: Yes  Small pupils?: Yes  Last eye exam: unknown    For exam results, see Encounter Report.

## 2019-12-09 NOTE — PATIENT INSTRUCTIONS
We have reviewed your prescription medications.   We will update your flu shot today.   We will order an eye camera exam.   We reviewed your labs and they look good! HgbA1c was better at 7.2.   Please  your bp medicne which you were out of. Watch bp at home. We can refer back to shoulder doctor when you are ready.   Let me know where to send an order for a power wheel chair.     Emilia Mahmood!

## 2019-12-12 ENCOUNTER — TELEPHONE (OUTPATIENT)
Dept: OPHTHALMOLOGY | Facility: CLINIC | Age: 64
End: 2019-12-12

## 2019-12-12 NOTE — TELEPHONE ENCOUNTER
Called patient regarding diabetic eye screening results ; gave patient his results he already have an appointment schedule     ----- Message from RADHA Trivedi, OD sent at 12/12/2019  8:25 AM CST -----  Previous patient of Dr Caceres, needs to schedule for annual visit 2-3 months.

## 2019-12-15 NOTE — PROGRESS NOTES
Subjective:       Patient ID: Brando Melgoza is a 64 y.o. male.    Chief Complaint: Follow-up     I have reviewed the PM,  and  for this patient    He  has a past medical history of Cancer, Cataract, Chronic kidney disease, Diabetes mellitus, Diabetes mellitus, type 2, Hyperlipidemia, and Hypertension.     The patient presents today for follow-up of chronic conditions. We reviewed his recent labs. His HgbA1c is good at 7.2. His blood counts and blood chemistries were good. He is due for an eye exam. He will do eye camera today. He also needs a flu shot. His right shoulder has been bothering him. He doesn't check sugars at home. He complains that his fingers tingle. His BP is elevated today. He states that he was out of one of his bP meds. He has requested a refill and will pick it up today.     He states that [facility] had someone call him and they recommended that he get a power wheel chair. I have advised him that I will send the order where he tells me to send it.     The patient denies chest pain, shortness of breath, nausea, or dizziness.     Active Ambulatory Problems     Diagnosis Date Noted    Gait abnormality 07/08/2014    Right leg weakness 07/08/2014    Phantom limb pain 07/08/2014    Essential hypertension 07/17/2014    HLD (hyperlipidemia) 07/17/2014    DM (diabetes mellitus) 07/17/2014    PAD (peripheral artery disease) 07/17/2014    CKD (chronic kidney disease) stage 3, GFR 30-59 ml/min 07/17/2014    S/P BKA (below knee amputation) 07/17/2014    Obesity, unspecified obesity severity, unspecified obesity type 07/17/2014    Sarcoidosis 07/17/2014    Colon polyps 10/23/2014    Hemorrhoids, internal 10/23/2014    Personal history of colon cancer 10/23/2014    Proliferative diabetic retinopathy 01/09/2015    Senile nuclear sclerosis 01/14/2015    Orchitis and epididymitis 04/08/2015    Erectile dysfunction 07/16/2015    BPH (benign prostatic hyperplasia) 07/16/2015     Prosthesis fitting 07/16/2015    Impingement syndrome of left shoulder 09/23/2015    Left shoulder pain 06/22/2016    (HFpEF) heart failure with preserved ejection fraction 07/15/2016    Cardiomyopathy 07/15/2016    LVH (left ventricular hypertrophy) 07/15/2016    Type 2 diabetes mellitus with stage 3 chronic kidney disease, with long-term current use of insulin 05/17/2017    Chronic cough 07/18/2017    History of colon cancer 05/22/2018    S/P BKA (below knee amputation), right 10/08/2018    Rotator cuff disorder, right 10/08/2018    Chronic right shoulder pain 11/08/2018    Uncontrolled type 2 diabetes mellitus with both eyes affected by proliferative retinopathy and macular edema, with long-term current use of insulin 11/09/2018    Neuropathy associated with endocrine disorder 12/09/2019     Resolved Ambulatory Problems     Diagnosis Date Noted    S/P BKA (below knee amputation) unilateral 06/30/2014    CHF with left ventricular diastolic dysfunction, NYHA class 2 07/17/2014    Cellulitis 07/17/2014    S/P BKA (below knee amputation) 10/13/2014    Nuclear sclerotic cataract of right eye 01/09/2015    Sepsis 04/06/2015    S/P BKA (below knee amputation) unilateral 09/23/2015    History of right below knee amputation 06/22/2016    ANGEL (dyspnea on exertion) 05/17/2017    Pre-op evaluation 10/08/2018     Past Medical History:   Diagnosis Date    Cancer     Cataract     Chronic kidney disease     Diabetes mellitus     Diabetes mellitus, type 2     Hyperlipidemia     Hypertension          MEDICATIONS:  Current Outpatient Medications:     albuterol (PROVENTIL/VENTOLIN HFA) 90 mcg/actuation inhaler, Inhale 1-2 puffs into the lungs every 6 (six) hours as needed for Wheezing. Rescue, Disp: 1 Inhaler, Rfl: 0    alfuzosin (UROXATRAL) 10 mg Tb24, Take 1 tablet (10 mg total) by mouth daily with breakfast., Disp: 30 tablet, Rfl: 11    amLODIPine (NORVASC) 10 MG tablet, Take 1 tablet (10 mg  "total) by mouth once daily., Disp: 90 tablet, Rfl: 0    aspirin (ECOTRIN) 81 MG EC tablet, Take 81 mg by mouth once daily., Disp: , Rfl:     atorvastatin (LIPITOR) 80 MG tablet, TAKE 1 TABLET BY MOUTH ONCE DAILY IN THE EVENING, Disp: 90 tablet, Rfl: 1    BD ULTRA-FINE JERRY PEN NEEDLE 32 gauge x 5/32" Ndle, USE 1 PEN NEEDLE THREE TIMES DAILY, Disp: 100 each, Rfl: 6    carvedilol (COREG) 25 MG tablet, TAKE 1 TABLET BY MOUTH TWICE DAILY WITH MEALS, Disp: 180 tablet, Rfl: 3    clotrimazole (LOTRIMIN) 1 % cream, Apply 1 application topically 2 (two) times daily., Disp: , Rfl:     fluocinolone (SYNALAR) 0.01 % external solution, APPLY  SOLUTION TOPICALLY TO SCALP ONCE DAILY IN THE EVENING, Disp: 60 mL, Rfl: 1    fluocinonide 0.05% (LIDEX) 0.05 % cream, APPLY  CREAM TOPICALLY TWICE DAILY, Disp: 30 g, Rfl: 2    folic acid (FOLVITE) 1 MG tablet, Take 1 tablet (1 mg total) by mouth once daily., Disp: 90 tablet, Rfl: 3    furosemide (LASIX) 40 MG tablet, TAKE ONE TABLET BY MOUTH ONCE DAILY (UP  TO  4  TABLETS  A  DAY  EXTRA,  ONLY  AS  NEEDED  FOR  SWELLING), Disp: 120 tablet, Rfl: 11    insulin aspart U-100 (NOVOLOG FLEXPEN U-100 INSULIN) 100 unit/mL (3 mL) InPn pen, Inject 20 Units into the skin 3 (three) times daily., Disp: 15 mL, Rfl: 5    insulin detemir U-100 (LEVEMIR FLEXTOUCH U-100 INSULN) 100 unit/mL (3 mL) SubQ InPn pen, Inject 80 Units into the skin every evening., Disp: 75 mL, Rfl: 2    ketoconazole (NIZORAL) 2 % cream, APPLY A THIN LAYER OF  CREAM TOPICALLY TO AFFECTED AREA (FACE) TWICE DAILY, Disp: 120 g, Rfl: 3    ketoconazole (NIZORAL) 2 % shampoo, USE SHAMPOO TOPICALLY ON SCALP TWICE A WEEK, Disp: 120 mL, Rfl: 5    lancets (ONETOUCH ULTRASOFT LANCETS) Misc, 1 each by Misc.(Non-Drug; Combo Route) route 3 (three) times daily before meals., Disp: 100 each, Rfl: 11    linaclotide (LINZESS) 145 mcg Cap capsule, Take 1 capsule (145 mcg total) by mouth once daily., Disp: 30 capsule, Rfl: 3    " lisinopril (PRINIVIL,ZESTRIL) 40 MG tablet, TAKE 1 TABLET BY MOUTH ONCE DAILY, Disp: 90 tablet, Rfl: 0    meclizine (ANTIVERT) 25 mg tablet, Take 1 tablet (25 mg total) by mouth 3 (three) times daily as needed., Disp: 60 tablet, Rfl: 1    meloxicam (MOBIC) 15 MG tablet, Take 1 tablet (15 mg total) by mouth once daily., Disp: 30 tablet, Rfl: 3    methotrexate 2.5 MG Tab, Take 4 tablets (10 mg total) by mouth every 7 days., Disp: 16 tablet, Rfl: 1    polyethylene glycol (GLYCOLAX) 17 gram/dose powder, Take 17 g by mouth once daily., Disp: 1700 g, Rfl: 11    POLYETHYLENE GLYCOL 3350 (MIRALAX ORAL), Take by mouth., Disp: , Rfl:     spironolactone (ALDACTONE) 25 MG tablet, Take 1 tablet (25 mg total) by mouth once daily., Disp: 90 tablet, Rfl: 3    triamcinolone acetonide 0.1% (KENALOG) 0.1 % cream, Mix in a 50:50 portion with your Ketoconazole cream and apply nightly to the affected areas., Disp: 45 g, Rfl: 1    TRILIPIX 135 mg CpDR, TAKE 1 CAPSULE BY MOUTH EVERY DAY, Disp: 30 capsule, Rfl: 6    vardenafil (LEVITRA) 20 MG tablet, Take 1 tablet (20 mg total) by mouth daily as needed for Erectile Dysfunction., Disp: 15 tablet, Rfl: 5    finasteride (PROSCAR) 5 mg tablet, Take 1 tablet (5 mg total) by mouth once daily., Disp: 30 tablet, Rfl: 11      HEALTH MAINTENANCE:   Health Maintenance   Topic Date Due    Hemoglobin A1c  03/02/2020    Foot Exam  05/14/2020    Lipid Panel  12/02/2020    Low Dose Statin  12/09/2020    Eye Exam  12/12/2020    Colonoscopy  06/12/2021    Pneumococcal Vaccine (65+ High/Highest Risk) (2 of 2 - PPSV23) 07/06/2021    TETANUS VACCINE  03/08/2027    Hepatitis C Screening  Completed       Review of Systems   Constitutional: Negative for chills, fatigue and fever.   HENT: Negative for congestion, ear discharge, ear pain, rhinorrhea and sore throat.    Eyes: Negative for discharge, redness and itching.   Respiratory: Negative for cough, chest tightness, shortness of breath and  wheezing.    Cardiovascular: Negative for chest pain, palpitations and leg swelling.   Gastrointestinal: Negative for abdominal pain, constipation, diarrhea, nausea and vomiting.   Endocrine: Negative for cold intolerance and heat intolerance.   Genitourinary: Negative for dysuria, flank pain, frequency and hematuria.   Musculoskeletal: Negative for arthralgias, back pain, joint swelling and myalgias.   Skin: Negative for color change and rash.   Neurological: Positive for numbness. Negative for dizziness, tremors and headaches.   Psychiatric/Behavioral: Negative for dysphoric mood and sleep disturbance. The patient is not nervous/anxious.        Objective:          A1C:  Recent Labs   Lab 03/01/17  1008 07/03/17  0853 11/10/17  0707 02/15/18  0645 06/18/18  0727 11/08/18  0903 05/14/19  1445 09/03/19  0916 12/02/19  0720   Hemoglobin A1C 8.0 H 8.4 H 7.6 H 7.8 H 7.2 H 7.4 H 8.6 H 8.0 H 7.2 H     CBC:  Recent Labs   Lab 09/05/17  1127 11/10/17  0707 03/01/18  1004 09/17/18  0803 03/10/19  0923 05/14/19  1445 09/03/19  0916 10/21/19  1141 12/02/19  0720   WBC 6.97 6.44 7.08 6.47 5.43 8.33 5.96 11.08 6.40   RBC 4.53 L 4.65 4.66 4.30 L 4.63 4.40 L 4.48 L 4.25 L 4.79   Hemoglobin 13.8 L 14.4 14.1 13.3 L 14.2 13.6 L 13.7 L 13.1 L 14.6   Hematocrit 40.4 42.0 42.3 39.8 L 43.0 39.7 L 40.4 38.5 L 43.6   Platelets 191 212 225 189 205 220 199 279 216   Mean Corpuscular Volume 89 90 91 93 93 90 90 91 91   Mean Corpuscular Hemoglobin 30.5 31.0 30.3 30.9 30.7 30.9 30.6 30.8 30.5   Mean Corpuscular Hemoglobin Conc 34.2 34.3 33.3 33.4 33.0 34.3 33.9 34.0 33.5     CMP:  Recent Labs   Lab 03/01/17  1008 07/03/17  0853  11/10/17  0707 02/15/18  0645  06/18/18  0727  05/14/19  1445 09/03/19  0916 10/21/19  1141 12/02/19  0720   Glucose 177 H 266 H   < > 238 H 236 H   < > 182 H   < > 187 H 197 H 94 176 H   Calcium 9.3 10.0   < > 9.3 9.6   < > 9.6   < > 9.5 9.4 9.2 10.8 H   Albumin 4.3 4.0   < > 4.3 3.9   < > 3.9   < > 4.3 4.2 4.1 4.6    Total Protein 7.3 7.0  --  7.3 6.9  --  7.1  --  7.2 7.3 7.8 8.0   Sodium 140 140   < > 142 140   < > 145   < > 145 142 142 144   Potassium 4.0 5.0   < > 4.2 4.7   < > 4.3   < > 4.5 4.2 3.7 4.8   CO2 26 28   < > 28 28   < > 26   < > 28 27 26 32 H   Chloride 108 104   < > 106 106   < > 108   < > 106 108 105 105   BUN, Bld 25 H 35 H   < > 23 25 H   < > 22   < > 19 19 16 18   Creatinine 1.4 1.4   < > 1.3 1.4   < > 1.5 H   < > 1.14 1.02 1.03 1.10   Alkaline Phosphatase 55 70  --  84 75  --  97  --  98 104 90 106   ALT 28 17  --  20 16  --  20  --  24 21 16 21   AST 25 17  --  23 19  --  18  --  26 26 24 27   Total Bilirubin 0.8 1.0  --  0.8 0.7  --  0.5  --  0.6 0.7 0.8 0.5    < > = values in this interval not displayed.     LIPIDS:  Recent Labs   Lab 11/10/17  0707 11/08/18  0903 05/14/19  1445 12/02/19  0720   HDL 23 L 23 L 24 L 28 L   Cholesterol 99 L 103 L 102 L 102 L   Triglycerides 116 116 91 100   LDL Cholesterol 52.8 L 56.8 L 59.8 L 54.0 L   Hdl/Cholesterol Ratio 23.2 22.3 23.5 27.5   Non-HDL Cholesterol 76 80 78 74   Total Cholesterol/HDL Ratio 4.3 4.5 4.3 3.6     TSH:            Physical Exam   Constitutional: He appears well-developed and well-nourished. He does not have a sickly appearance.   HENT:   Head: Normocephalic and atraumatic.   Right Ear: External ear normal. Tympanic membrane is not erythematous. No middle ear effusion.   Left Ear: External ear normal. Tympanic membrane is not erythematous.  No middle ear effusion.   Nose: No rhinorrhea. Right sinus exhibits no maxillary sinus tenderness and no frontal sinus tenderness. Left sinus exhibits no maxillary sinus tenderness and no frontal sinus tenderness.   Mouth/Throat: No posterior oropharyngeal edema or posterior oropharyngeal erythema. No tonsillar exudate.   Eyes: Pupils are equal, round, and reactive to light. Conjunctivae and EOM are normal. Right eye exhibits no discharge. Left eye exhibits no discharge. Right conjunctiva is not injected.  Left conjunctiva is not injected.   Neck: No thyromegaly present.   Cardiovascular: Normal rate, regular rhythm, normal heart sounds and intact distal pulses.   No murmur heard.  Pulmonary/Chest: Effort normal and breath sounds normal. He has no wheezes. He has no rhonchi. He has no rales.   Abdominal: Bowel sounds are normal. He exhibits no distension. There is no tenderness.   Musculoskeletal: He exhibits no edema or tenderness.   Amputated right leg.  Swelling in left leg.   Shoulder has decreased range of motion.    Lymphadenopathy:     He has no cervical adenopathy.   Neurological: He displays normal reflexes. No cranial nerve deficit.   Skin: Skin is warm and dry. No lesion and no rash noted.   Psychiatric: He has a normal mood and affect. His behavior is normal. His mood appears not anxious. His speech is not rapid and/or pressured. He is not agitated and not aggressive. He does not exhibit a depressed mood.             Assessment and Plan:     Problem List Items Addressed This Visit        Ophtho    Proliferative diabetic retinopathy - he needs eye exam. scjedule camera.        Cardiac/Vascular    Essential hypertension - BP is too high. He will  his medications and return for a recheck with the nurse.       HLD (hyperlipidemia) - on lipitor. Check labs.       PAD (peripheral artery disease) - control sugars. contineu aspirin.       Cardiomyopathy - follow-up with cardiologist.        Renal/    CKD (chronic kidney disease) stage 3, GFR 30-59 ml/min - check labs.        Oncology    Personal history of colon cancer - stable.        Endocrine    DM (diabetes mellitus) - order eye exam. Labs reviewed. HgbA1c good at 7.2. Continue current meds.     Relevant Orders    Diabetic Eye Screening Photo (Completed)    Neuropathy associated with endocrine disorder - control sugars.        Orthopedic    S/P BKA (below knee amputation), right - stable. He is requesting a pwer wheel chair.       Chronic right  shoulder pain - take meloxicam. Refer to shoulder doctor when ready.       Other Visit Diagnoses     Need for influenza vaccination    -  Primary - flu shot.     Relevant Orders    Influenza - High Dose (65+) (PF) (IM) (Completed)          Orders Placed This Encounter    Influenza - High Dose (65+) (PF) (IM)    Diabetic Eye Screening Photo         Follow-up with me in 4 months.       Muriel Vaughan MD,  FACP  Internal Medicine

## 2019-12-24 ENCOUNTER — TELEPHONE (OUTPATIENT)
Dept: FAMILY MEDICINE | Facility: CLINIC | Age: 64
End: 2019-12-24

## 2019-12-24 NOTE — TELEPHONE ENCOUNTER
I will write prescription.     DX:  E34.9 - neuropathy  Z85.519 - s/p BKA  M25.511 - chronic shoulder pain

## 2019-12-24 NOTE — TELEPHONE ENCOUNTER
----- Message from Angelina Thomas sent at 12/24/2019 11:03 AM CST -----  Contact: [facility]  CALL TYPE: PATIENT REQUEST    Contact: [facility]  Name of Who is Calling: Na  What is the request in detail: orders for motorized wheelchair  Can the clinic reply by [facility]: no   What Number to [facility] Back [facility]: [phone] ext [phone]  [phone]

## 2019-12-29 RX ORDER — INSULIN ASPART 100 [IU]/ML
INJECTION, SOLUTION INTRAVENOUS; SUBCUTANEOUS
Qty: 15 ML | Refills: 0 | Status: SHIPPED | OUTPATIENT
Start: 2019-12-29 | End: 2020-01-27

## 2020-01-02 ENCOUNTER — TELEPHONE (OUTPATIENT)
Dept: FAMILY MEDICINE | Facility: CLINIC | Age: 65
End: 2020-01-02

## 2020-01-02 NOTE — TELEPHONE ENCOUNTER
----- Message from Isiah Bill sent at 1/2/2020 11:57 AM CST -----  Contact: [facility]  [facility] called to speak with office to see if office receive fax that was sent.            callback number [facility] [phone]

## 2020-01-03 ENCOUNTER — TELEPHONE (OUTPATIENT)
Dept: FAMILY MEDICINE | Facility: CLINIC | Age: 65
End: 2020-01-03

## 2020-01-03 NOTE — TELEPHONE ENCOUNTER
Spoke to [facility], they need patient's last office note and Wheelchair eval questionnaire. She is faxing the questionnaire over today.

## 2020-01-03 NOTE — TELEPHONE ENCOUNTER
----- Message from Alba Rodriguez sent at 1/3/2020 12:32 PM CST -----  [facility]. They need a face to face evaluation. Please call [phone]

## 2020-01-13 DIAGNOSIS — E78.5 HYPERLIPIDEMIA: ICD-10-CM

## 2020-01-13 RX ORDER — ATORVASTATIN CALCIUM 80 MG/1
80 TABLET, FILM COATED ORAL NIGHTLY
Qty: 90 TABLET | Refills: 1 | Status: SHIPPED | OUTPATIENT
Start: 2020-01-13 | End: 2020-04-19

## 2020-01-20 RX ORDER — AMLODIPINE BESYLATE 10 MG/1
10 TABLET ORAL DAILY
Qty: 90 TABLET | Refills: 1 | Status: SHIPPED | OUTPATIENT
Start: 2020-01-20 | End: 2020-07-30

## 2020-01-27 RX ORDER — INSULIN ASPART 100 [IU]/ML
INJECTION, SOLUTION INTRAVENOUS; SUBCUTANEOUS
Qty: 15 ML | Refills: 1 | Status: SHIPPED | OUTPATIENT
Start: 2020-01-27 | End: 2020-02-12 | Stop reason: CLARIF

## 2020-02-11 RX ORDER — LISINOPRIL 40 MG/1
40 TABLET ORAL DAILY
Qty: 90 TABLET | Refills: 1 | Status: SHIPPED | OUTPATIENT
Start: 2020-02-11 | End: 2020-08-14

## 2020-02-12 ENCOUNTER — TELEPHONE (OUTPATIENT)
Dept: INTERNAL MEDICINE | Facility: CLINIC | Age: 65
End: 2020-02-12

## 2020-02-12 RX ORDER — INSULIN LISPRO 100 [IU]/ML
20 INJECTION, SOLUTION INTRAVENOUS; SUBCUTANEOUS
Qty: 18 ML | Refills: 11 | Status: SHIPPED | OUTPATIENT
Start: 2020-02-12 | End: 2020-06-04 | Stop reason: CLARIF

## 2020-02-12 NOTE — TELEPHONE ENCOUNTER
[facility] sent a letter stating that a temporary supply of his medication was given to him (novolog flexpen) due to it not being on pt's formulary drug list. They would prefer pt to be on:    Insulin Lispro   Humalog    Please print out one of the preferred medications so that it can be faxed to [facility]. Thanks

## 2020-02-12 NOTE — TELEPHONE ENCOUNTER
Ok. I have sent him a prescription for humalog pens. He can use it the same way as the novolog -- 20 units 3 times a day before meals.

## 2020-03-13 DIAGNOSIS — E11.9 TYPE 2 DIABETES MELLITUS WITHOUT COMPLICATION: ICD-10-CM

## 2020-04-06 ENCOUNTER — OFFICE VISIT (OUTPATIENT)
Dept: FAMILY MEDICINE | Facility: CLINIC | Age: 65
End: 2020-04-06
Payer: MEDICARE

## 2020-04-06 DIAGNOSIS — N25.81 SECONDARY HYPERPARATHYROIDISM, RENAL: ICD-10-CM

## 2020-04-06 DIAGNOSIS — E78.2 MIXED HYPERLIPIDEMIA: ICD-10-CM

## 2020-04-06 DIAGNOSIS — N18.30 TYPE 2 DIABETES MELLITUS WITH STAGE 3 CHRONIC KIDNEY DISEASE, WITH LONG-TERM CURRENT USE OF INSULIN: ICD-10-CM

## 2020-04-06 DIAGNOSIS — G63 NEUROPATHY ASSOCIATED WITH ENDOCRINE DISORDER: ICD-10-CM

## 2020-04-06 DIAGNOSIS — Z79.4 TYPE 2 DIABETES MELLITUS WITH STAGE 3 CHRONIC KIDNEY DISEASE, WITH LONG-TERM CURRENT USE OF INSULIN: ICD-10-CM

## 2020-04-06 DIAGNOSIS — E34.9 NEUROPATHY ASSOCIATED WITH ENDOCRINE DISORDER: ICD-10-CM

## 2020-04-06 DIAGNOSIS — E11.22 TYPE 2 DIABETES MELLITUS WITH STAGE 3 CHRONIC KIDNEY DISEASE, WITH LONG-TERM CURRENT USE OF INSULIN: ICD-10-CM

## 2020-04-06 DIAGNOSIS — I10 ESSENTIAL HYPERTENSION: Primary | ICD-10-CM

## 2020-04-06 DIAGNOSIS — L30.9 DERMATITIS: ICD-10-CM

## 2020-04-06 PROCEDURE — 99211 PR OFFICE/OUTPT VISIT, EST, LEVL I: ICD-10-PCS | Mod: 95,,, | Performed by: INTERNAL MEDICINE

## 2020-04-06 PROCEDURE — 99211 OFF/OP EST MAY X REQ PHY/QHP: CPT | Mod: 95,,, | Performed by: INTERNAL MEDICINE

## 2020-04-06 RX ORDER — KETOCONAZOLE 20 MG/ML
SHAMPOO, SUSPENSION TOPICAL
Qty: 120 ML | Refills: 5 | Status: SHIPPED | OUTPATIENT
Start: 2020-04-06 | End: 2023-11-15 | Stop reason: SDUPTHER

## 2020-04-06 NOTE — PROGRESS NOTES
Subjective:       Patient ID: Brando Melgoza is a 65 y.o. male.    Chief Complaint: No chief complaint on file.     I have reviewed the PMH, SH and FH for this patient    He  has a past medical history of Cancer, Cataract, Chronic kidney disease, Diabetes mellitus, Diabetes mellitus, type 2, Hyperlipidemia, and Hypertension.     The patient presents today for follow-up of chronic conditions.     The patient location is: [place]  The chief complaint leading to consultation is: chronic conditions  Visit type: Virtual visit with synchronous audio and video  Total time spent with patient: 8 minutes  Each patient to whom he or she provides medical services by telemedicine is:  (1) informed of the relationship between the physician and patient and the respective role of any other health care provider with respect to management of the patient; and (2) notified that he or she may decline to receive medical services by telemedicine and may withdraw from such care at any time.    He was unable to connect virtually, so we talked on the phone.  The consent statement was read to him and he agreed.  We were following up on his diabetes.  Lab Results       Component                Value               Date                       HGBA1C                   7.2 (H)             12/02/2019              He states that he needs a refill of his shampoo.  He reports that his blood sugars have been slightly better since we changed his medicines last time.  In the morning his sugar was about 200 during the day is usually around 190.  He is requesting a new meter that is continuous glucose monitoring.  I will try to send him a prescription for this.  He has been taking 80 units of Levemir daily.  He has also been taking 20 units of lispro with meals.  He states that he does have low sugars sometimes around 2:00 a.m. in the afternoon.  He states that he has a blood pressure cuff but he has not been checking his blood pressure recently.  He  needs to change the batteries in his blood pressure cuff.    Active Ambulatory Problems     Diagnosis Date Noted    Gait abnormality 07/08/2014    Right leg weakness 07/08/2014    Phantom limb pain 07/08/2014    Essential hypertension 07/17/2014    HLD (hyperlipidemia) 07/17/2014    DM (diabetes mellitus) 07/17/2014    PAD (peripheral artery disease) 07/17/2014    CKD (chronic kidney disease) stage 3, GFR 30-59 ml/min 07/17/2014    S/P BKA (below knee amputation) 07/17/2014    Obesity, unspecified obesity severity, unspecified obesity type 07/17/2014    Sarcoidosis 07/17/2014    Colon polyps 10/23/2014    Hemorrhoids, internal 10/23/2014    Personal history of colon cancer 10/23/2014    Proliferative diabetic retinopathy 01/09/2015    Senile nuclear sclerosis 01/14/2015    Orchitis and epididymitis 04/08/2015    Erectile dysfunction 07/16/2015    BPH (benign prostatic hyperplasia) 07/16/2015    Prosthesis fitting 07/16/2015    Impingement syndrome of left shoulder 09/23/2015    Left shoulder pain 06/22/2016    (HFpEF) heart failure with preserved ejection fraction 07/15/2016    Cardiomyopathy 07/15/2016    LVH (left ventricular hypertrophy) 07/15/2016    Type 2 diabetes mellitus with stage 3 chronic kidney disease, with long-term current use of insulin 05/17/2017    Chronic cough 07/18/2017    History of colon cancer 05/22/2018    S/P BKA (below knee amputation), right 10/08/2018    Rotator cuff disorder, right 10/08/2018    Chronic right shoulder pain 11/08/2018    Uncontrolled type 2 diabetes mellitus with both eyes affected by proliferative retinopathy and macular edema, with long-term current use of insulin 11/09/2018    Neuropathy associated with endocrine disorder 12/09/2019    Secondary hyperparathyroidism, renal 04/06/2020     Resolved Ambulatory Problems     Diagnosis Date Noted    S/P BKA (below knee amputation) unilateral 06/30/2014    CHF with left ventricular  "diastolic dysfunction, NYHA class 2 07/17/2014    Cellulitis 07/17/2014    S/P BKA (below knee amputation) 10/13/2014    Nuclear sclerotic cataract of right eye 01/09/2015    Sepsis 04/06/2015    S/P BKA (below knee amputation) unilateral 09/23/2015    History of right below knee amputation 06/22/2016    ANGEL (dyspnea on exertion) 05/17/2017    Pre-op evaluation 10/08/2018     Past Medical History:   Diagnosis Date    Cancer     Cataract     Chronic kidney disease     Diabetes mellitus     Diabetes mellitus, type 2     Hyperlipidemia     Hypertension          MEDICATIONS:  Current Outpatient Medications:     albuterol (PROVENTIL/VENTOLIN HFA) 90 mcg/actuation inhaler, Inhale 1-2 puffs into the lungs every 6 (six) hours as needed for Wheezing. Rescue, Disp: 1 Inhaler, Rfl: 0    alfuzosin (UROXATRAL) 10 mg Tb24, Take 1 tablet (10 mg total) by mouth daily with breakfast., Disp: 30 tablet, Rfl: 11    amLODIPine (NORVASC) 10 MG tablet, Take 1 tablet (10 mg total) by mouth once daily., Disp: 90 tablet, Rfl: 1    aspirin (ECOTRIN) 81 MG EC tablet, Take 81 mg by mouth once daily., Disp: , Rfl:     atorvastatin (LIPITOR) 80 MG tablet, Take 1 tablet (80 mg total) by mouth every evening., Disp: 90 tablet, Rfl: 1    BD ULTRA-FINE JERRY PEN NEEDLE 32 gauge x 5/32" Ndle, USE 1 PEN NEEDLE THREE TIMES DAILY, Disp: 100 each, Rfl: 6    carvedilol (COREG) 25 MG tablet, TAKE 1 TABLET BY MOUTH TWICE DAILY WITH MEALS, Disp: 180 tablet, Rfl: 3    clotrimazole (LOTRIMIN) 1 % cream, Apply 1 application topically 2 (two) times daily., Disp: , Rfl:     finasteride (PROSCAR) 5 mg tablet, Take 1 tablet (5 mg total) by mouth once daily., Disp: 30 tablet, Rfl: 11    fluocinolone (SYNALAR) 0.01 % external solution, APPLY  SOLUTION TOPICALLY TO SCALP ONCE DAILY IN THE EVENING, Disp: 60 mL, Rfl: 1    fluocinonide 0.05% (LIDEX) 0.05 % cream, APPLY  CREAM TOPICALLY TWICE DAILY, Disp: 30 g, Rfl: 2    folic acid (FOLVITE) 1 MG " tablet, Take 1 tablet (1 mg total) by mouth once daily., Disp: 90 tablet, Rfl: 3    furosemide (LASIX) 40 MG tablet, TAKE ONE TABLET BY MOUTH ONCE DAILY (UP  TO  4  TABLETS  A  DAY  EXTRA,  ONLY  AS  NEEDED  FOR  SWELLING), Disp: 120 tablet, Rfl: 11    HYDROcodone-acetaminophen (NORCO) 5-325 mg per tablet, Take 1 tablet by mouth every 6 (six) hours as needed for Pain., Disp: 28 tablet, Rfl: 0    insulin detemir U-100 (LEVEMIR FLEXTOUCH U-100 INSULN) 100 unit/mL (3 mL) SubQ InPn pen, Inject 80 Units into the skin every evening., Disp: 75 mL, Rfl: 2    insulin lispro 100 unit/mL pen, Inject 20 Units into the skin 3 (three) times daily before meals., Disp: 18 mL, Rfl: 11    ketoconazole (NIZORAL) 2 % cream, APPLY A THIN LAYER OF  CREAM TOPICALLY TO AFFECTED AREA (FACE) TWICE DAILY, Disp: 120 g, Rfl: 3    ketoconazole (NIZORAL) 2 % shampoo, USE SHAMPOO TOPICALLY ON SCALP TWICE A WEEK, Disp: 120 mL, Rfl: 5    lancets (ONETOUCH ULTRASOFT LANCETS) Misc, 1 each by Misc.(Non-Drug; Combo Route) route 3 (three) times daily before meals., Disp: 100 each, Rfl: 11    linaclotide (LINZESS) 145 mcg Cap capsule, Take 1 capsule (145 mcg total) by mouth once daily., Disp: 30 capsule, Rfl: 3    lisinopril (PRINIVIL,ZESTRIL) 40 MG tablet, Take 1 tablet (40 mg total) by mouth once daily., Disp: 90 tablet, Rfl: 1    meclizine (ANTIVERT) 25 mg tablet, Take 1 tablet (25 mg total) by mouth 3 (three) times daily as needed. (Patient not taking: Reported on 1/8/2020), Disp: 60 tablet, Rfl: 1    meloxicam (MOBIC) 15 MG tablet, Take 1 tablet (15 mg total) by mouth once daily. (Patient not taking: Reported on 1/8/2020), Disp: 30 tablet, Rfl: 3    methotrexate 2.5 MG Tab, Take 4 tablets (10 mg total) by mouth every 7 days., Disp: 16 tablet, Rfl: 3    polyethylene glycol (GLYCOLAX) 17 gram/dose powder, Take 17 g by mouth once daily., Disp: 1700 g, Rfl: 11    POLYETHYLENE GLYCOL 3350 (MIRALAX ORAL), Take by mouth., Disp: , Rfl:      triamcinolone acetonide 0.1% (KENALOG) 0.1 % cream, Mix in a 50:50 portion with your Ketoconazole cream and apply nightly to the affected areas., Disp: 45 g, Rfl: 1    TRILIPIX 135 mg CpDR, TAKE 1 CAPSULE BY MOUTH EVERY DAY, Disp: 30 capsule, Rfl: 6    vardenafiL (LEVITRA) 20 MG tablet, Take 1 tablet (20 mg total) by mouth daily as needed for Erectile Dysfunction., Disp: 15 tablet, Rfl: 5      HEALTH MAINTENANCE:   Health Maintenance   Topic Date Due    Hemoglobin A1c  03/02/2020    Foot Exam  05/14/2020    Lipid Panel  12/02/2020    Eye Exam  12/12/2020    Low Dose Statin  01/13/2021    Colonoscopy  06/12/2021    Pneumococcal Vaccine (65+ High/Highest Risk) (2 of 2 - PPSV23) 07/06/2021    TETANUS VACCINE  03/08/2027    Hepatitis C Screening  Completed       Review of Systems   Constitutional: Negative for chills, fatigue and fever.   HENT: Negative for congestion, ear discharge, ear pain, rhinorrhea and sore throat.    Eyes: Negative for discharge, redness and itching.   Respiratory: Negative for cough, chest tightness, shortness of breath and wheezing.    Cardiovascular: Negative for chest pain, palpitations and leg swelling.   Gastrointestinal: Negative for abdominal pain, constipation, diarrhea, nausea and vomiting.   Endocrine: Negative for cold intolerance and heat intolerance.   Genitourinary: Negative for dysuria, flank pain, frequency and hematuria.   Musculoskeletal: Negative for arthralgias, back pain, joint swelling and myalgias.   Skin: Negative for color change and rash.   Neurological: Negative for dizziness, tremors, numbness and headaches.   Psychiatric/Behavioral: Negative for dysphoric mood and sleep disturbance. The patient is not nervous/anxious.        Objective:          A1C:  Recent Labs   Lab 07/03/17  0853 11/10/17  0707 02/15/18  0645 06/18/18  0727 11/08/18  0903 05/14/19  1445 09/03/19  0916 12/02/19  0720   Hemoglobin A1C 8.4 H 7.6 H 7.8 H 7.2 H 7.4 H 8.6 H 8.0 H 7.2 H      CBC:  Recent Labs   Lab 09/05/17  1127 11/10/17  0707 03/01/18  1004 09/17/18  0803 03/10/19  0923 05/14/19  1445 09/03/19  0916 10/21/19  1141 12/02/19  0720 01/07/20  1435   WBC 6.97 6.44 7.08 6.47 5.43 8.33 5.96 11.08 6.40 8.31   RBC 4.53 L 4.65 4.66 4.30 L 4.63 4.40 L 4.48 L 4.25 L 4.79 4.23 L   Hemoglobin 13.8 L 14.4 14.1 13.3 L 14.2 13.6 L 13.7 L 13.1 L 14.6 13.4 L   Hematocrit 40.4 42.0 42.3 39.8 L 43.0 39.7 L 40.4 38.5 L 43.6 39.6 L   Platelets 191 212 225 189 205 220 199 279 216 227   Mean Corpuscular Volume 89 90 91 93 93 90 90 91 91 94   Mean Corpuscular Hemoglobin 30.5 31.0 30.3 30.9 30.7 30.9 30.6 30.8 30.5 31.7 H   Mean Corpuscular Hemoglobin Conc 34.2 34.3 33.3 33.4 33.0 34.3 33.9 34.0 33.5 33.8     CMP:  Recent Labs   Lab 07/03/17  0853  11/10/17  0707 02/15/18  0645  06/18/18  0727  05/14/19  1445 09/03/19  0916 10/21/19  1141 12/02/19  0720 01/07/20  1435   Glucose 266 H   < > 238 H 236 H   < > 182 H   < > 187 H 197 H 94 176 H  --    Calcium 10.0   < > 9.3 9.6   < > 9.6   < > 9.5 9.4 9.2 10.8 H  --    Albumin 4.0   < > 4.3 3.9   < > 3.9   < > 4.3 4.2 4.1 4.6 4.3   Total Protein 7.0  --  7.3 6.9  --  7.1  --  7.2 7.3 7.8 8.0 7.8   Sodium 140   < > 142 140   < > 145   < > 145 142 142 144  --    Potassium 5.0   < > 4.2 4.7   < > 4.3   < > 4.5 4.2 3.7 4.8  --    CO2 28   < > 28 28   < > 26   < > 28 27 26 32 H  --    Chloride 104   < > 106 106   < > 108   < > 106 108 105 105  --    BUN, Bld 35 H   < > 23 25 H   < > 22   < > 19 19 16 18  --    Creatinine 1.4   < > 1.3 1.4   < > 1.5 H   < > 1.14 1.02 1.03 1.10  --    Alkaline Phosphatase 70  --  84 75  --  97  --  98 104 90 106 110   ALT 17  --  20 16  --  20  --  24 21 16 21 21   AST 17  --  23 19  --  18  --  26 26 24 27 20   Total Bilirubin 1.0  --  0.8 0.7  --  0.5  --  0.6 0.7 0.8 0.5 0.6    < > = values in this interval not displayed.     LIPIDS:  Recent Labs   Lab 11/10/17  0707 11/08/18  0903 05/14/19  1445 12/02/19  0720   HDL 23 L 23 L 24 L  28 L   Cholesterol 99 L 103 L 102 L 102 L   Triglycerides 116 116 91 100   LDL Cholesterol 52.8 L 56.8 L 59.8 L 54.0 L   Hdl/Cholesterol Ratio 23.2 22.3 23.5 27.5   Non-HDL Cholesterol 76 80 78 74   Total Cholesterol/HDL Ratio 4.3 4.5 4.3 3.6     TSH:            Physical Exam          Assessment and Plan:     Problem List Items Addressed This Visit        Cardiac/Vascular    Essential hypertension - Primary- he thinks his blood pressure has been good.  He will try to send me a message or call later with his blood pressure readings after he changes the batteries in his machine.      HLD (hyperlipidemia)- he is currently on atorvastatin and try lipids for cholesterol.  Continue these.       Endocrine    Neuropathy associated with endocrine disorder- try to get blood sugars under control.      Secondary hyperparathyroidism, renal- chronic.  Stable.  Diabetes type 2 with retinopathy and long-term insulin use - continue Levemir 80 units a day and list pro 20 units before meals.  Continue to work on diet.  It sounds like sugars are slightly better.  We may need to increase his Levemir if sugars stay at.  I would like to recheck him in about 2 months.      Other Visit Diagnoses     Dermatitis    - I refilled his shampoo.  Stable.    Relevant Medications    ketoconazole (NIZORAL) 2 % shampoo          Orders Placed This Encounter    ketoconazole (NIZORAL) 2 % shampoo         Follow-up with me in 2 months.       Muriel Vaughan MD,  FACP  Internal Medicine

## 2020-04-06 NOTE — PATIENT INSTRUCTIONS
We have reviewed your prescription medications.   Work on diet to reduce carbs and sugars.   Continue levemir 80 units and 20 units of lispro before meals.     Follow-up in 2 months.

## 2020-04-19 DIAGNOSIS — E78.5 HYPERLIPIDEMIA: ICD-10-CM

## 2020-04-19 RX ORDER — ATORVASTATIN CALCIUM 80 MG/1
TABLET, FILM COATED ORAL
Qty: 90 TABLET | Refills: 1 | Status: SHIPPED | OUTPATIENT
Start: 2020-04-19 | End: 2020-11-13 | Stop reason: SDUPTHER

## 2020-05-28 ENCOUNTER — TELEPHONE (OUTPATIENT)
Dept: FAMILY MEDICINE | Facility: CLINIC | Age: 65
End: 2020-05-28

## 2020-05-28 NOTE — TELEPHONE ENCOUNTER
----- Message from Shaniqua Yates sent at 5/28/2020  8:01 AM CDT -----  Contact: PT  PT is requesting a New prescription  in for novolog because insurance does not cover the other insulin (LISPRO)  PT can be reached at [phone]    Thanks

## 2020-05-28 NOTE — TELEPHONE ENCOUNTER
Spoke to [facility] pharmacy to confirm, Lispro and Levemir are covered. Called patient he got now insurance and it says they prefer Novolog instead of the lispro.

## 2020-06-04 RX ORDER — INSULIN ASPART 100 [IU]/ML
20 INJECTION, SOLUTION INTRAVENOUS; SUBCUTANEOUS
Qty: 1 BOX | Refills: 5 | Status: SHIPPED | OUTPATIENT
Start: 2020-06-04 | End: 2020-11-13 | Stop reason: SDUPTHER

## 2020-08-10 ENCOUNTER — TELEPHONE (OUTPATIENT)
Dept: FAMILY MEDICINE | Facility: CLINIC | Age: 65
End: 2020-08-10

## 2020-08-10 RX ORDER — LANCETS
1 EACH MISCELLANEOUS
Qty: 100 EACH | Refills: 11 | Status: CANCELLED | OUTPATIENT
Start: 2020-08-10

## 2020-08-10 NOTE — TELEPHONE ENCOUNTER
Spoke with [employer] new set up for Diabetic supplies have been ordered.     Meter kit, lancets, strips, alcohol swabs, and solutions.       Testing 3x daily. 90 day supply with no refills

## 2020-09-29 ENCOUNTER — PATIENT MESSAGE (OUTPATIENT)
Dept: OTHER | Facility: OTHER | Age: 65
End: 2020-09-29

## 2020-10-14 RX ORDER — LANCETS
EACH MISCELLANEOUS
Qty: 300 EACH | Refills: 0 | Status: SHIPPED | OUTPATIENT
Start: 2020-10-14 | End: 2021-06-08 | Stop reason: SDUPTHER

## 2020-10-21 RX ORDER — PEN NEEDLE, DIABETIC 31 GX5/16"
NEEDLE, DISPOSABLE MISCELLANEOUS
Qty: 30 EACH | Refills: 0 | Status: SHIPPED | OUTPATIENT
Start: 2020-10-21 | End: 2020-12-16

## 2020-10-21 NOTE — TELEPHONE ENCOUNTER
"Please call pt. PCP is no longer at this practice and needs to establish with new PCP prior to further refills. Refilled for 30  days until he can establish with a new PCP  1. Please schedule new PCP visit with an available provider  2. Please let that provider know that patient scheduled so that pre-visit labs can be ordered  Orders Placed This Encounter    BD ULTRA-FINE JERRY PEN NEEDLE 32 gauge x 5/32" Mila Gonzalez D.O.   Family Medicine  "

## 2020-11-13 ENCOUNTER — OFFICE VISIT (OUTPATIENT)
Dept: FAMILY MEDICINE | Facility: CLINIC | Age: 65
End: 2020-11-13
Payer: MEDICARE

## 2020-11-13 VITALS
HEIGHT: 67 IN | BODY MASS INDEX: 34.14 KG/M2 | SYSTOLIC BLOOD PRESSURE: 170 MMHG | WEIGHT: 217.5 LBS | OXYGEN SATURATION: 98 % | RESPIRATION RATE: 18 BRPM | DIASTOLIC BLOOD PRESSURE: 90 MMHG | HEART RATE: 54 BPM | TEMPERATURE: 98 F

## 2020-11-13 DIAGNOSIS — I10 ESSENTIAL HYPERTENSION: ICD-10-CM

## 2020-11-13 DIAGNOSIS — E78.2 MIXED HYPERLIPIDEMIA: ICD-10-CM

## 2020-11-13 DIAGNOSIS — E78.5 HYPERLIPIDEMIA: ICD-10-CM

## 2020-11-13 DIAGNOSIS — K59.09 CHRONIC CONSTIPATION: ICD-10-CM

## 2020-11-13 DIAGNOSIS — L21.9 DERMATITIS, SEBORRHEIC: ICD-10-CM

## 2020-11-13 PROCEDURE — 1125F AMNT PAIN NOTED PAIN PRSNT: CPT | Mod: HCNC,S$GLB,, | Performed by: FAMILY MEDICINE

## 2020-11-13 PROCEDURE — 99214 OFFICE O/P EST MOD 30 MIN: CPT | Mod: HCNC,S$GLB,, | Performed by: FAMILY MEDICINE

## 2020-11-13 PROCEDURE — 3077F SYST BP >= 140 MM HG: CPT | Mod: HCNC,CPTII,S$GLB, | Performed by: FAMILY MEDICINE

## 2020-11-13 PROCEDURE — 1101F PT FALLS ASSESS-DOCD LE1/YR: CPT | Mod: HCNC,CPTII,S$GLB, | Performed by: FAMILY MEDICINE

## 2020-11-13 PROCEDURE — 99999 PR PBB SHADOW E&M-EST. PATIENT-LVL V: ICD-10-PCS | Mod: PBBFAC,HCNC,, | Performed by: FAMILY MEDICINE

## 2020-11-13 PROCEDURE — 99999 PR PBB SHADOW E&M-EST. PATIENT-LVL V: CPT | Mod: PBBFAC,HCNC,, | Performed by: FAMILY MEDICINE

## 2020-11-13 PROCEDURE — 3288F PR FALLS RISK ASSESSMENT DOCUMENTED: ICD-10-PCS | Mod: HCNC,CPTII,S$GLB, | Performed by: FAMILY MEDICINE

## 2020-11-13 PROCEDURE — 3080F PR MOST RECENT DIASTOLIC BLOOD PRESSURE >= 90 MM HG: ICD-10-PCS | Mod: HCNC,CPTII,S$GLB, | Performed by: FAMILY MEDICINE

## 2020-11-13 PROCEDURE — 3008F BODY MASS INDEX DOCD: CPT | Mod: HCNC,CPTII,S$GLB, | Performed by: FAMILY MEDICINE

## 2020-11-13 PROCEDURE — 1125F PR PAIN SEVERITY QUANTIFIED, PAIN PRESENT: ICD-10-PCS | Mod: HCNC,S$GLB,, | Performed by: FAMILY MEDICINE

## 2020-11-13 PROCEDURE — 3288F FALL RISK ASSESSMENT DOCD: CPT | Mod: HCNC,CPTII,S$GLB, | Performed by: FAMILY MEDICINE

## 2020-11-13 PROCEDURE — 3077F PR MOST RECENT SYSTOLIC BLOOD PRESSURE >= 140 MM HG: ICD-10-PCS | Mod: HCNC,CPTII,S$GLB, | Performed by: FAMILY MEDICINE

## 2020-11-13 PROCEDURE — 2024F PR 7 FIELD PHOTOS WITH INTERP/ REVIEW: ICD-10-PCS | Mod: HCNC,S$GLB,, | Performed by: FAMILY MEDICINE

## 2020-11-13 PROCEDURE — 1159F PR MEDICATION LIST DOCUMENTED IN MEDICAL RECORD: ICD-10-PCS | Mod: HCNC,S$GLB,, | Performed by: FAMILY MEDICINE

## 2020-11-13 PROCEDURE — 3051F HG A1C>EQUAL 7.0%<8.0%: CPT | Mod: HCNC,CPTII,S$GLB, | Performed by: FAMILY MEDICINE

## 2020-11-13 PROCEDURE — 3080F DIAST BP >= 90 MM HG: CPT | Mod: HCNC,CPTII,S$GLB, | Performed by: FAMILY MEDICINE

## 2020-11-13 PROCEDURE — 1101F PR PT FALLS ASSESS DOC 0-1 FALLS W/OUT INJ PAST YR: ICD-10-PCS | Mod: HCNC,CPTII,S$GLB, | Performed by: FAMILY MEDICINE

## 2020-11-13 PROCEDURE — 1159F MED LIST DOCD IN RCRD: CPT | Mod: HCNC,S$GLB,, | Performed by: FAMILY MEDICINE

## 2020-11-13 PROCEDURE — 99214 PR OFFICE/OUTPT VISIT, EST, LEVL IV, 30-39 MIN: ICD-10-PCS | Mod: HCNC,S$GLB,, | Performed by: FAMILY MEDICINE

## 2020-11-13 PROCEDURE — 3008F PR BODY MASS INDEX (BMI) DOCUMENTED: ICD-10-PCS | Mod: HCNC,CPTII,S$GLB, | Performed by: FAMILY MEDICINE

## 2020-11-13 PROCEDURE — 2024F 7 FLD RTA PHOTO EVC RTNOPTHY: CPT | Mod: HCNC,S$GLB,, | Performed by: FAMILY MEDICINE

## 2020-11-13 PROCEDURE — 3051F PR MOST RECENT HEMOGLOBIN A1C LEVEL 7.0 - < 8.0%: ICD-10-PCS | Mod: HCNC,CPTII,S$GLB, | Performed by: FAMILY MEDICINE

## 2020-11-13 RX ORDER — INSULIN ASPART 100 [IU]/ML
20 INJECTION, SOLUTION INTRAVENOUS; SUBCUTANEOUS
Qty: 2 BOX | Refills: 5 | Status: SHIPPED | OUTPATIENT
Start: 2020-11-13 | End: 2021-09-09 | Stop reason: SDUPTHER

## 2020-11-13 RX ORDER — KETOCONAZOLE 20 MG/G
CREAM TOPICAL
Qty: 120 G | Refills: 3 | Status: SHIPPED | OUTPATIENT
Start: 2020-11-13 | End: 2020-12-24 | Stop reason: SDUPTHER

## 2020-11-13 RX ORDER — INSULIN DETEMIR 100 [IU]/ML
80 INJECTION, SOLUTION SUBCUTANEOUS NIGHTLY
Qty: 75 ML | Refills: 2 | Status: SHIPPED | OUTPATIENT
Start: 2020-11-13 | End: 2021-09-09 | Stop reason: SDUPTHER

## 2020-11-13 RX ORDER — ATORVASTATIN CALCIUM 80 MG/1
80 TABLET, FILM COATED ORAL NIGHTLY
Qty: 90 TABLET | Refills: 1 | Status: SHIPPED | OUTPATIENT
Start: 2020-11-13 | End: 2021-09-14

## 2020-11-13 RX ORDER — AMLODIPINE BESYLATE 10 MG/1
10 TABLET ORAL DAILY
Qty: 90 TABLET | Refills: 2 | Status: SHIPPED | OUTPATIENT
Start: 2020-11-13 | End: 2021-08-07

## 2020-11-13 RX ORDER — LISINOPRIL 40 MG/1
40 TABLET ORAL DAILY
Qty: 90 TABLET | Refills: 2 | Status: SHIPPED | OUTPATIENT
Start: 2020-11-13 | End: 2021-09-09 | Stop reason: SDUPTHER

## 2020-11-13 NOTE — PATIENT INSTRUCTIONS
Established High Blood Pressure    High blood pressure (hypertension) is a chronic disease. Often, healthcare providers dont know what causes it. But it can be caused by certain health conditions and medicines.  If you have high blood pressure, you may not have any symptoms. If you do have symptoms, they may include headache, dizziness, changes in your vision, chest pain, and shortness of breath. But even without symptoms, high blood pressure thats not treated raises your risk for heart attack and stroke. High blood pressure is a serious health risk and shouldnt be ignored.  A blood pressure reading is made up of two numbers: a higher number over a lower number. The top number is the systolic pressure. The bottom number is the diastolic pressure. A normal blood pressure is a systolic pressure of  less than 120 over a diastolic pressure of less than 80. You will see your blood pressure readings written together. For example, a person with a systolic pressure of 188 and a diastolic pressure of 78 will have 118/78 written in the medical record.  High blood pressure is when either the top number is 140 or higher, or the bottom number is 90 or higher. This must be the result when taking your blood pressure a number of times. The blood pressures between normal and high are called prehypertension.  Home care  If you have high blood pressure, you should do what is listed below to lower your blood pressure. If you are taking medicines for high blood pressure, these methods may reduce or end your need for medicines in the future.  · Begin a weight-loss program if you are overweight.  · Cut back on how much salt you get in your diet. Heres how to do this:  ¨ Dont eat foods that have a lot of salt. These include olives, pickles, smoked meats, and salted potato chips.  ¨ Dont add salt to your food at the table.  ¨ Use only small amounts of salt when cooking.  · Start an exercise program. Talk with your healthcare  provider about the type of exercise program that would be best for you. It doesn't have to be hard. Even brisk walking for 20 minutes 3 times a week is a good form of exercise.  · Dont take medicines that stimulate the heart. This includes many over-the-counter cold and sinus decongestant pills and sprays, as well as diet pills. Check the warnings about hypertension on the label. Before buying any over-the-counter medicines or supplements, always ask the pharmacist about the product's potential interaction with your high blood pressure and your high blood pressure medicines.  · Stimulants such as amphetamine or cocaine could be deadly for someone with high blood pressure. Never take these.  · Limit how much caffeine you get in your diet. Switch to caffeine-free products.  · Stop smoking. If you are a long-time smoker, this can be hard. Talk to your healthcare provider about medicines and nicotine replacement options to help you. Also, enroll in a stop-smoking program to make it more likely that you will quit for good.  · Learn how to handle stress. This is an important part of any program to lower blood pressure. Learn about relaxation methods like meditation, yoga, or biofeedback.  · If your provider prescribed medicines, take them exactly as directed. Missing doses may cause your blood pressure get out of control.  · If you miss a dose or doses, check with your healthcare provider or pharmacist about what to do.  · Consider buying an automatic blood pressure machine. Ask your provider for a recommendation. You can get one of these at most pharmacies.     The American Heart Association recommends the following guidelines for home blood pressure monitoring:  · Don't smoke or drink coffee for 30 minutes before taking your blood pressure.  · Go to the bathroom before the test.  · Relax for 5 minutes before taking the measurement.  · Sit with your back supported (don't sit on a couch or soft chair); keep your feet on  the floor uncrossed. Place your arm on a solid flat surface (like a table) with the upper part of the arm at heart level. Place the middle of the cuff directly above the eye of the elbow. Check the monitor's instruction manual for an illustration.  · Take multiple readings. When you measure, take 2 to 3 readings one minute apart and record all of the results.  · Take your blood pressure at the same time every day, or as your healthcare provider recommends.  · Record the date, time, and blood pressure reading.  · Take the record with you to your next medical appointment. If your blood pressure monitor has a built-in memory, simply take the monitor with you to your next appointment.  · Call your provider if you have several high readings. Don't be frightened by a single high blood pressure reading, but if you get several high readings, check in with your healthcare provider.  · Note: When blood pressure reaches a systolic (top number) of 180 or higher OR diastolic (bottom number) of 110 or higher, seek emergency medical treatment.  Follow-up care  You will need to see your healthcare provider regularly. This is to check your blood pressure and to make changes to your medicines. Make a follow-up appointment as directed. Bring the record of your home blood pressure readings to the appointment.  When to seek medical advice  Call your healthcare provider right away if any of these occur:  · Blood pressure reaches a systolic (upper number) of 180 or higher OR a diastolic (bottom number) of 110 or higher  · Chest pain or shortness of breath  · Severe headache  · Throbbing or rushing sound in the ears  · Nosebleed  · Sudden severe pain in your belly (abdomen)  · Extreme drowsiness, confusion, or fainting  · Dizziness or spinning sensation (vertigo)  · Weakness of an arm or leg or one side of the face  · You have problems speaking or seeing   Date Last Reviewed: 12/1/2016  © 8494-2535 [employer]. [address] [place] [address]  [place] [address]. All rights reserved. This information is not intended as a substitute for professional medical care. Always follow your healthcare professional's instructions.

## 2020-11-13 NOTE — PROGRESS NOTES
Subjective:       Patient ID: Brando Melgoza is a 65 y.o. male.    Chief Complaint: Medication Refill and Shoulder Pain (right)    Pt with multiple medical problems here for follow up and refill of medication,  Denies headache, dizziness or chest pain  BG in 220 in am, 100 in pm, pt denies any paresthesias, denies any hypoglycemic episodes    Pt controls his bg with inuslin levimir and novolog      Review of Systems    Objective:      Physical Exam  Vitals signs and nursing note reviewed.   Constitutional:       General: He is not in acute distress.     Appearance: Normal appearance. He is well-developed. He is not ill-appearing, toxic-appearing or diaphoretic.   HENT:      Head: Normocephalic and atraumatic.      Jaw: No trismus.      Right Ear: Hearing, tympanic membrane, ear canal and external ear normal.      Left Ear: Hearing, tympanic membrane, ear canal and external ear normal.      Nose: Nose normal. No nasal deformity, mucosal edema or rhinorrhea.      Right Sinus: No maxillary sinus tenderness or frontal sinus tenderness.      Left Sinus: No maxillary sinus tenderness or frontal sinus tenderness.      Mouth/Throat:      Dentition: Normal dentition.      Pharynx: Uvula midline. No posterior oropharyngeal erythema or uvula swelling.   Eyes:      General: Lids are normal. No scleral icterus.        Right eye: No discharge.         Left eye: No discharge.      Conjunctiva/sclera: Conjunctivae normal.      Comments: Sclera clear bilat   Neck:      Musculoskeletal: Full passive range of motion without pain, normal range of motion and neck supple.      Trachea: Trachea and phonation normal.   Cardiovascular:      Rate and Rhythm: Normal rate and regular rhythm.      Pulses: Normal pulses.      Heart sounds: Normal heart sounds.   Pulmonary:      Effort: Pulmonary effort is normal. No respiratory distress.      Breath sounds: Normal breath sounds.   Abdominal:      General: Bowel sounds are normal. There is no  distension.      Palpations: Abdomen is soft. There is no mass or pulsatile mass.      Tenderness: There is no abdominal tenderness.   Musculoskeletal: Normal range of motion.         General: No deformity.      Comments: Right lower extrimity with orthotic     Skin:     General: Skin is warm and dry.      Coloration: Skin is not pale.   Neurological:      Mental Status: He is alert and oriented to person, place, and time.      Motor: No abnormal muscle tone.      Coordination: Coordination normal.   Psychiatric:         Speech: Speech normal.         Behavior: Behavior normal. Behavior is cooperative.         Thought Content: Thought content normal.         Judgment: Judgment normal.         Assessment:       1. 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 Chronic constipation    3. Dermatitis, seborrheic    4. Hyperlipidemia    5. Essential hypertension    6. Mixed hyperlipidemia        Plan:         Brando was seen today for medication refill and shoulder pain.    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    Dermatitis, seborrheic  -     ketoconazole (NIZORAL) 2 % cream; APPLY A THIN LAYER OF  CREAM TOPICALLY TO AFFECTED AREA (FACE) TWICE DAILY    Hyperlipidemia  -     atorvastatin (LIPITOR) 80 MG tablet; Take 1 tablet (80 mg total) by mouth every evening.    Essential hypertension    Mixed hyperlipidemia    Other orders  -     amLODIPine (NORVASC) 10 MG tablet; Take 1 tablet (10 mg total) by mouth once daily.  -     lisinopriL (PRINIVIL,ZESTRIL) 40 MG tablet; Take 1 tablet (40 mg total) by mouth once daily.  -     insulin detemir  U-100 (LEVEMIR FLEXTOUCH U-100 INSULN) 100 unit/mL (3 mL) InPn pen; Inject 80 Units into the skin every evening.  -     insulin aspart U-100 (NOVOLOG FLEXPEN U-100 INSULIN) 100 unit/mL (3 mL) InPn pen; Inject 20 Units into the skin 3 (three) times daily with meals.      Pt admits getting Flu vaccine, pneumovax and shingles vaccine at [facility]

## 2020-12-24 ENCOUNTER — OFFICE VISIT (OUTPATIENT)
Dept: FAMILY MEDICINE | Facility: CLINIC | Age: 65
End: 2020-12-24
Payer: MEDICARE

## 2020-12-24 VITALS
TEMPERATURE: 97 F | WEIGHT: 222 LBS | DIASTOLIC BLOOD PRESSURE: 80 MMHG | SYSTOLIC BLOOD PRESSURE: 164 MMHG | HEIGHT: 67 IN | OXYGEN SATURATION: 100 % | BODY MASS INDEX: 34.84 KG/M2 | HEART RATE: 61 BPM

## 2020-12-24 DIAGNOSIS — Z89.511 STATUS POST BELOW-KNEE AMPUTATION OF RIGHT LOWER EXTREMITY: ICD-10-CM

## 2020-12-24 DIAGNOSIS — L28.0 LICHEN SIMPLEX CHRONICUS: ICD-10-CM

## 2020-12-24 DIAGNOSIS — I10 ESSENTIAL HYPERTENSION: Primary | ICD-10-CM

## 2020-12-24 DIAGNOSIS — L21.9 DERMATITIS, SEBORRHEIC: ICD-10-CM

## 2020-12-24 DIAGNOSIS — L40.8 SEBOPSORIASIS: ICD-10-CM

## 2020-12-24 PROCEDURE — 1125F PR PAIN SEVERITY QUANTIFIED, PAIN PRESENT: ICD-10-PCS | Mod: HCNC,S$GLB,, | Performed by: FAMILY MEDICINE

## 2020-12-24 PROCEDURE — 3288F FALL RISK ASSESSMENT DOCD: CPT | Mod: HCNC,CPTII,S$GLB, | Performed by: FAMILY MEDICINE

## 2020-12-24 PROCEDURE — 99499 UNLISTED E&M SERVICE: CPT | Mod: S$GLB,,, | Performed by: FAMILY MEDICINE

## 2020-12-24 PROCEDURE — 99214 OFFICE O/P EST MOD 30 MIN: CPT | Mod: HCNC,S$GLB,, | Performed by: FAMILY MEDICINE

## 2020-12-24 PROCEDURE — 3077F SYST BP >= 140 MM HG: CPT | Mod: HCNC,CPTII,S$GLB, | Performed by: FAMILY MEDICINE

## 2020-12-24 PROCEDURE — 3008F BODY MASS INDEX DOCD: CPT | Mod: HCNC,CPTII,S$GLB, | Performed by: FAMILY MEDICINE

## 2020-12-24 PROCEDURE — 1125F AMNT PAIN NOTED PAIN PRSNT: CPT | Mod: HCNC,S$GLB,, | Performed by: FAMILY MEDICINE

## 2020-12-24 PROCEDURE — 99999 PR PBB SHADOW E&M-EST. PATIENT-LVL III: ICD-10-PCS | Mod: PBBFAC,HCNC,, | Performed by: FAMILY MEDICINE

## 2020-12-24 PROCEDURE — 99499 RISK ADDL DX/OHS AUDIT: ICD-10-PCS | Mod: S$GLB,,, | Performed by: FAMILY MEDICINE

## 2020-12-24 PROCEDURE — 3288F PR FALLS RISK ASSESSMENT DOCUMENTED: ICD-10-PCS | Mod: HCNC,CPTII,S$GLB, | Performed by: FAMILY MEDICINE

## 2020-12-24 PROCEDURE — 99214 PR OFFICE/OUTPT VISIT, EST, LEVL IV, 30-39 MIN: ICD-10-PCS | Mod: HCNC,S$GLB,, | Performed by: FAMILY MEDICINE

## 2020-12-24 PROCEDURE — 3077F PR MOST RECENT SYSTOLIC BLOOD PRESSURE >= 140 MM HG: ICD-10-PCS | Mod: HCNC,CPTII,S$GLB, | Performed by: FAMILY MEDICINE

## 2020-12-24 PROCEDURE — 3079F PR MOST RECENT DIASTOLIC BLOOD PRESSURE 80-89 MM HG: ICD-10-PCS | Mod: HCNC,CPTII,S$GLB, | Performed by: FAMILY MEDICINE

## 2020-12-24 PROCEDURE — 99999 PR PBB SHADOW E&M-EST. PATIENT-LVL III: CPT | Mod: PBBFAC,HCNC,, | Performed by: FAMILY MEDICINE

## 2020-12-24 PROCEDURE — 1101F PR PT FALLS ASSESS DOC 0-1 FALLS W/OUT INJ PAST YR: ICD-10-PCS | Mod: HCNC,CPTII,S$GLB, | Performed by: FAMILY MEDICINE

## 2020-12-24 PROCEDURE — 3008F PR BODY MASS INDEX (BMI) DOCUMENTED: ICD-10-PCS | Mod: HCNC,CPTII,S$GLB, | Performed by: FAMILY MEDICINE

## 2020-12-24 PROCEDURE — 1159F MED LIST DOCD IN RCRD: CPT | Mod: HCNC,S$GLB,, | Performed by: FAMILY MEDICINE

## 2020-12-24 PROCEDURE — 3079F DIAST BP 80-89 MM HG: CPT | Mod: HCNC,CPTII,S$GLB, | Performed by: FAMILY MEDICINE

## 2020-12-24 PROCEDURE — 1101F PT FALLS ASSESS-DOCD LE1/YR: CPT | Mod: HCNC,CPTII,S$GLB, | Performed by: FAMILY MEDICINE

## 2020-12-24 PROCEDURE — 1159F PR MEDICATION LIST DOCUMENTED IN MEDICAL RECORD: ICD-10-PCS | Mod: HCNC,S$GLB,, | Performed by: FAMILY MEDICINE

## 2020-12-24 RX ORDER — INFLUENZA A VIRUS A/MICHIGAN/45/2015 X-275 (H1N1) ANTIGEN (FORMALDEHYDE INACTIVATED), INFLUENZA A VIRUS A/SINGAPORE/INFIMH-16-0019/2016 IVR-186 (H3N2) ANTIGEN (FORMALDEHYDE INACTIVATED), INFLUENZA B VIRUS B/PHUKET/3073/2013 ANTIGEN (FORMALDEHYDE INACTIVATED), AND INFLUENZA B VIRUS B/MARYLAND/15/2016 BX-69A ANTIGEN (FORMALDEHYDE INACTIVATED) 60; 60; 60; 60 UG/.7ML; UG/.7ML; UG/.7ML; UG/.7ML
INJECTION, SUSPENSION INTRAMUSCULAR
COMMUNITY
Start: 2020-09-19 | End: 2021-01-29

## 2020-12-24 RX ORDER — KETOCONAZOLE 20 MG/G
CREAM TOPICAL
Qty: 120 G | Refills: 3 | Status: SHIPPED | OUTPATIENT
Start: 2020-12-24

## 2020-12-24 RX ORDER — TRIAMCINOLONE ACETONIDE 1 MG/G
CREAM TOPICAL
Qty: 45 G | Refills: 1 | Status: SHIPPED | OUTPATIENT
Start: 2020-12-24

## 2020-12-24 RX ORDER — IBUPROFEN 200 MG
16 TABLET ORAL
Qty: 30 TABLET | Refills: 12 | COMMUNITY
Start: 2020-12-24 | End: 2024-03-18

## 2020-12-24 NOTE — PATIENT INSTRUCTIONS
It was nice to see youBrando!    -we have reviewed your medications and refilled them as needed  -continue your insulin as is for now  -we will check blood work  -bring your pill bottles to your next appointment  -your blood pressure is elevated today; we will recheck it today and follow up on this at your next appointment

## 2020-12-24 NOTE — PROGRESS NOTES
EST PATIENT VISIT FAMILY MEDICINE    CC:   Chief Complaint   Patient presents with    [facility]       HPI: Brando Melgoza  is a 65 y.o. male:    Type 2 DM  80U levemir  novolog increased to 25U with meals  FBGs 220 on average   Did have a couple episodes of low blood sugars less than 70; last one was this past Tues    Taking his BP meds as prescribed  Does not have     ROS: Review of Systems   Constitutional: Negative for fever.   Respiratory: Negative for shortness of breath.    Cardiovascular: Negative for chest pain.       PMHX:   Past Medical History:   Diagnosis Date    Cancer     colon    Cataract     Chronic kidney disease     Diabetes mellitus     Diabetes mellitus, type 2     Hyperlipidemia     Hypertension        PSHX:   Past Surgical History:   Procedure Laterality Date    AMPUTATION Right     below the knee    CARDIAC CATHETERIZATION      CATARACT EXTRACTION      COLON SURGERY      COLONOSCOPY      COLONOSCOPY N/A 5/22/2018    Procedure: COLONOSCOPY;  Surgeon: Hallie Higuera MD;  [facility];  Service: Endoscopy;  Laterality: N/A;    COLONOSCOPY N/A 6/12/2018    Procedure: COLONOSCOPY;  Surgeon: Hallie Higuera MD;  [facility];  Service: Endoscopy;  Laterality: N/A;    SKIN BIOPSY         FAMHX:   Family History   Problem Relation Age of Onset    Diabetes Mother     Hypertension Father     Diabetes Sister     Diabetes Brother     Hypertension Brother     No Known Problems Paternal Grandmother     No Known Problems Paternal Grandfather     No Known Problems Maternal Grandmother     No Known Problems Maternal Grandfather        SOCHX:   Social History     Socioeconomic History    Marital status:      Spouse name: Not on file    Number of children: 1    Years of education: 12    Highest education level: Not on file   Occupational History    Not on file   Social Needs    Financial resource strain: Not on file    Food insecurity      "Worry: Not on file     Inability: Not on file    Transportation needs     Medical: Not on file     Non-medical: Not on file   Tobacco Use    Smoking status: Never Smoker    Smokeless tobacco: Never Used   Substance and Sexual Activity    Alcohol use: Yes     Comment: occ    Drug use: No    Sexual activity: Yes     Partners: Female   Lifestyle    Physical activity     Days per week: Not on file     Minutes per session: Not on file    Stress: Not on file   Relationships    Social connections     Talks on phone: Not on file     Gets together: Not on file     Attends Zoroastrianism service: Not on file     Active member of club or organization: Not on file     Attends meetings of clubs or organizations: Not on file     Relationship status: Not on file   Other Topics Concern    Not on file   Social History Narrative    Not on file       ALLERGIES: Review of patient's allergies indicates:  No Known Allergies    MEDS:   Current Outpatient Medications:     albuterol (PROVENTIL/VENTOLIN HFA) 90 mcg/actuation inhaler, Inhale 1-2 puffs into the lungs every 6 (six) hours as needed for Wheezing. Rescue, Disp: 1 Inhaler, Rfl: 0    alfuzosin (UROXATRAL) 10 mg Tb24, Take 1 tablet (10 mg total) by mouth daily with breakfast., Disp: 90 tablet, Rfl: 3    amLODIPine (NORVASC) 10 MG tablet, Take 1 tablet (10 mg total) by mouth once daily., Disp: 90 tablet, Rfl: 2    aspirin (ECOTRIN) 81 MG EC tablet, Take 81 mg by mouth once daily., Disp: , Rfl:     atorvastatin (LIPITOR) 80 MG tablet, Take 1 tablet (80 mg total) by mouth every evening., Disp: 90 tablet, Rfl: 1    BD ULTRA-FINE JERRY PEN NEEDLE 32 gauge x 5/32" Ndle, USE 1 PEN NEEDLE THREE TIMES DAILY, Disp: 100 each, Rfl: 0    blood sugar diagnostic Strp, Test blood sugar 3 times daily, Disp: 300 strip, Rfl: 0    carvediloL (COREG) 25 MG tablet, Take 1 tablet (25 mg total) by mouth 2 (two) times daily with meals., Disp: 180 tablet, Rfl: 3    clotrimazole (LOTRIMIN) 1 % " cream, Apply 1 application topically 2 (two) times daily., Disp: , Rfl:     finasteride (PROSCAR) 5 mg tablet, Take 1 tablet (5 mg total) by mouth once daily., Disp: 90 tablet, Rfl: 3    fluocinolone (SYNALAR) 0.01 % external solution, APPLY  SOLUTION TOPICALLY TO SCALP ONCE DAILY IN THE EVENING, Disp: 60 mL, Rfl: 1    fluocinonide 0.05% (LIDEX) 0.05 % cream, APPLY  CREAM TOPICALLY TWICE DAILY, Disp: 30 g, Rfl: 2    folic acid (FOLVITE) 1 MG tablet, Take 1 tablet (1 mg total) by mouth once daily., Disp: 90 tablet, Rfl: 3    furosemide (LASIX) 40 MG tablet, TAKE ONE TABLET BY MOUTH ONCE DAILY (UP  TO  4  TABLETS  A  DAY  EXTRA,  ONLY  AS  NEEDED  FOR  SWELLING), Disp: 120 tablet, Rfl: 11    HYDROcodone-acetaminophen (NORCO) 5-325 mg per tablet, Take 1 tablet by mouth every 6 (six) hours as needed for Pain., Disp: 28 tablet, Rfl: 0    insulin aspart U-100 (NOVOLOG FLEXPEN U-100 INSULIN) 100 unit/mL (3 mL) InPn pen, Inject 20 Units into the skin 3 (three) times daily with meals., Disp: 2 Box, Rfl: 5    insulin detemir U-100 (LEVEMIR FLEXTOUCH U-100 INSULN) 100 unit/mL (3 mL) InPn pen, Inject 80 Units into the skin every evening., Disp: 75 mL, Rfl: 2    ketoconazole (NIZORAL) 2 % cream, APPLY A THIN LAYER OF  CREAM TOPICALLY TO AFFECTED AREA (FACE) TWICE DAILY, Disp: 120 g, Rfl: 3    ketoconazole (NIZORAL) 2 % shampoo, USE SHAMPOO TOPICALLY ON SCALP TWICE A WEEK, Disp: 120 mL, Rfl: 5    lancets (ACCU-CHEK SOFTCLIX LANCETS) Misc, Test blood sugar 3 times daily, Disp: 300 each, Rfl: 0    lancets (ONETOUCH ULTRASOFT LANCETS) Misc, 1 each by Misc.(Non-Drug; Combo Route) route 3 (three) times daily before meals., Disp: 100 each, Rfl: 11    linaCLOtide (LINZESS) 145 mcg Cap capsule, Take 1 capsule (145 mcg total) by mouth once daily., Disp: 30 capsule, Rfl: 3    lisinopriL (PRINIVIL,ZESTRIL) 40 MG tablet, Take 1 tablet (40 mg total) by mouth once daily., Disp: 90 tablet, Rfl: 2    meclizine (ANTIVERT) 25 mg  "tablet, Take 1 tablet (25 mg total) by mouth 3 (three) times daily as needed., Disp: 60 tablet, Rfl: 1    polyethylene glycol (GLYCOLAX) 17 gram/dose powder, Take 17 g by mouth once daily., Disp: 1700 g, Rfl: 11    POLYETHYLENE GLYCOL 3350 (MIRALAX ORAL), Take by mouth., Disp: , Rfl:     triamcinolone acetonide 0.1% (KENALOG) 0.1 % cream, Mix in a 50:50 portion with your Ketoconazole cream and apply nightly to the affected areas., Disp: 45 g, Rfl: 1    TRILIPIX 135 mg CpDR, TAKE 1 CAPSULE BY MOUTH EVERY DAY, Disp: 30 capsule, Rfl: 6    vardenafiL (LEVITRA) 20 MG tablet, Take 1 tablet (20 mg total) by mouth daily as needed for Erectile Dysfunction., Disp: 15 tablet, Rfl: 7    FLUZONE HIGHDOSE QUAD 20-21  mcg/0.7 mL Syrg, PHARMACIST ADMINISTERED IMMUNIZATION ADMINISTERED AT TIME OF DISPENSING, Disp: , Rfl:     glucose 4 GM chewable tablet, Take 4 tablets (16 g total) by mouth as needed for Low blood sugar., Disp: 30 tablet, Rfl: 12    methotrexate 2.5 MG Tab, Take 4 tablets (10 mg total) by mouth every 7 days. (Patient not taking: Reported on 12/24/2020), Disp: 16 tablet, Rfl: 3    OBJECTIVE:   Vitals:    12/24/20 0840   BP: (!) 164/80   BP Location: Left arm   Patient Position: Sitting   BP Method: Large (Manual)   Pulse: 61   Temp: 97 °F (36.1 °C)   SpO2: 100%   Weight: 100.7 kg (222 lb)   Height: 5' 7" (1.702 m)     Body mass index is 34.77 kg/m².      Physical Exam  Vitals signs and nursing note reviewed.   Constitutional:       Appearance: Normal appearance.   HENT:      Head: Normocephalic and atraumatic.   Eyes:      General: No scleral icterus.     Extraocular Movements: Extraocular movements intact.   Pulmonary:      Effort: Pulmonary effort is normal.   Musculoskeletal:      Comments: Right BKA   Neurological:      Mental Status: He is alert.           LABS:   A1C:  Recent Labs   Lab 12/02/19  0720   Hemoglobin A1C 7.2 H     CBC:  Recent Labs   Lab 01/07/20  1435   WBC 8.31   RBC 4.23 L "   Hemoglobin 13.4 L   Hematocrit 39.6 L   Platelets 227   MCV 94   MCH 31.7 H   MCHC 33.8     CMP:  Recent Labs   Lab 12/02/19  0720 01/07/20  1435   Glucose 176 H  --    Calcium 10.8 H  --    Albumin 4.6 4.3   Total Protein 8.0 7.8   Sodium 144  --    Potassium 4.8  --    CO2 32 H  --    Chloride 105  --    BUN 18  --    Creatinine 1.10  --    Alkaline Phosphatase 106 110   ALT 21 21   AST 27 20   Total Bilirubin 0.5 0.6     LIPIDS:  Recent Labs   Lab 12/02/19  0720   HDL 28 L   Cholesterol 102 L   Triglycerides 100   LDL Cholesterol 54.0 L   HDL/Cholesterol Ratio 27.5   Non-HDL Cholesterol 74   Total Cholesterol/HDL Ratio 3.6     TSH:          ASSESSMENT & PLAN:  Encounter Diagnoses   Name Primary?   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, with long-term current use of insulin     Status post below-knee amputation of right lower extremity      -we have reviewed your medications and refilled them as needed  -continue your insulin as is for now  -we will check blood work  -bring your pill bottles to your next appointment  -your blood pressure is elevated today; we will recheck it today and follow up on this at your next appointment      Orders Placed This Encounter    Basic Metabolic Panel    Hemoglobin A1C    Lipid Panel    ketoconazole (NIZORAL) 2 % cream    triamcinolone acetonide 0.1% (KENALOG) 0.1 % cream    glucose 4 GM chewable tablet       Follow up in about 1 month (around 1/24/2021) for med review, lab review.    RTC/ED precautions discussed where applicable.   Encouraged patient to let me know if there are any further questions/concerns.     Advise patient/caretaker to check with insurance regarding orders to avoid unexpected fees/costs.     The patient/caretaker indicates understanding of these issues and agrees with the  plan.    Dr. Preethi Willard MD  Family Medicine

## 2021-01-29 ENCOUNTER — OFFICE VISIT (OUTPATIENT)
Dept: FAMILY MEDICINE | Facility: CLINIC | Age: 66
End: 2021-01-29
Payer: MEDICARE

## 2021-01-29 VITALS
DIASTOLIC BLOOD PRESSURE: 64 MMHG | WEIGHT: 225 LBS | SYSTOLIC BLOOD PRESSURE: 152 MMHG | BODY MASS INDEX: 35.31 KG/M2 | OXYGEN SATURATION: 99 % | HEART RATE: 55 BPM | HEIGHT: 67 IN

## 2021-01-29 DIAGNOSIS — I73.9 PAD (PERIPHERAL ARTERY DISEASE): ICD-10-CM

## 2021-01-29 DIAGNOSIS — I10 ESSENTIAL HYPERTENSION: Primary | ICD-10-CM

## 2021-01-29 DIAGNOSIS — E11.9 TYPE 2 DIABETES MELLITUS WITHOUT COMPLICATION, WITH LONG-TERM CURRENT USE OF INSULIN: ICD-10-CM

## 2021-01-29 DIAGNOSIS — Z89.511 STATUS POST BELOW-KNEE AMPUTATION OF RIGHT LOWER EXTREMITY: ICD-10-CM

## 2021-01-29 DIAGNOSIS — Z79.4 TYPE 2 DIABETES MELLITUS WITHOUT COMPLICATION, WITH LONG-TERM CURRENT USE OF INSULIN: ICD-10-CM

## 2021-01-29 PROCEDURE — 3077F SYST BP >= 140 MM HG: CPT | Mod: CPTII,S$GLB,, | Performed by: FAMILY MEDICINE

## 2021-01-29 PROCEDURE — 1125F AMNT PAIN NOTED PAIN PRSNT: CPT | Mod: S$GLB,,, | Performed by: FAMILY MEDICINE

## 2021-01-29 PROCEDURE — 1159F MED LIST DOCD IN RCRD: CPT | Mod: S$GLB,,, | Performed by: FAMILY MEDICINE

## 2021-01-29 PROCEDURE — 3008F PR BODY MASS INDEX (BMI) DOCUMENTED: ICD-10-PCS | Mod: CPTII,S$GLB,, | Performed by: FAMILY MEDICINE

## 2021-01-29 PROCEDURE — 3077F PR MOST RECENT SYSTOLIC BLOOD PRESSURE >= 140 MM HG: ICD-10-PCS | Mod: CPTII,S$GLB,, | Performed by: FAMILY MEDICINE

## 2021-01-29 PROCEDURE — 3051F PR MOST RECENT HEMOGLOBIN A1C LEVEL 7.0 - < 8.0%: ICD-10-PCS | Mod: CPTII,S$GLB,, | Performed by: FAMILY MEDICINE

## 2021-01-29 PROCEDURE — 99214 PR OFFICE/OUTPT VISIT, EST, LEVL IV, 30-39 MIN: ICD-10-PCS | Mod: S$GLB,,, | Performed by: FAMILY MEDICINE

## 2021-01-29 PROCEDURE — 3078F DIAST BP <80 MM HG: CPT | Mod: CPTII,S$GLB,, | Performed by: FAMILY MEDICINE

## 2021-01-29 PROCEDURE — 3078F PR MOST RECENT DIASTOLIC BLOOD PRESSURE < 80 MM HG: ICD-10-PCS | Mod: CPTII,S$GLB,, | Performed by: FAMILY MEDICINE

## 2021-01-29 PROCEDURE — 99999 PR PBB SHADOW E&M-EST. PATIENT-LVL V: ICD-10-PCS | Mod: PBBFAC,,, | Performed by: FAMILY MEDICINE

## 2021-01-29 PROCEDURE — 3051F HG A1C>EQUAL 7.0%<8.0%: CPT | Mod: CPTII,S$GLB,, | Performed by: FAMILY MEDICINE

## 2021-01-29 PROCEDURE — 99499 UNLISTED E&M SERVICE: CPT | Mod: S$GLB,,, | Performed by: FAMILY MEDICINE

## 2021-01-29 PROCEDURE — 99999 PR PBB SHADOW E&M-EST. PATIENT-LVL V: CPT | Mod: PBBFAC,,, | Performed by: FAMILY MEDICINE

## 2021-01-29 PROCEDURE — 1159F PR MEDICATION LIST DOCUMENTED IN MEDICAL RECORD: ICD-10-PCS | Mod: S$GLB,,, | Performed by: FAMILY MEDICINE

## 2021-01-29 PROCEDURE — 99499 RISK ADDL DX/OHS AUDIT: ICD-10-PCS | Mod: S$GLB,,, | Performed by: FAMILY MEDICINE

## 2021-01-29 PROCEDURE — 3008F BODY MASS INDEX DOCD: CPT | Mod: CPTII,S$GLB,, | Performed by: FAMILY MEDICINE

## 2021-01-29 PROCEDURE — 99214 OFFICE O/P EST MOD 30 MIN: CPT | Mod: S$GLB,,, | Performed by: FAMILY MEDICINE

## 2021-01-29 PROCEDURE — 1125F PR PAIN SEVERITY QUANTIFIED, PAIN PRESENT: ICD-10-PCS | Mod: S$GLB,,, | Performed by: FAMILY MEDICINE

## 2021-01-29 RX ORDER — SPIRONOLACTONE 25 MG/1
25 TABLET ORAL DAILY
Qty: 90 TABLET | Refills: 0 | Status: SHIPPED | OUTPATIENT
Start: 2021-01-29 | End: 2021-11-01 | Stop reason: SDUPTHER

## 2021-04-13 RX ORDER — PEN NEEDLE, DIABETIC 32GX 5/32"
NEEDLE, DISPOSABLE MISCELLANEOUS
Qty: 100 EACH | Refills: 2 | Status: SHIPPED | OUTPATIENT
Start: 2021-04-13 | End: 2021-09-29

## 2021-04-14 DIAGNOSIS — E11.9 TYPE 2 DIABETES MELLITUS WITHOUT COMPLICATION: ICD-10-CM

## 2021-05-05 DIAGNOSIS — E11.9 TYPE 2 DIABETES MELLITUS WITHOUT COMPLICATION: ICD-10-CM

## 2021-05-06 ENCOUNTER — PATIENT MESSAGE (OUTPATIENT)
Dept: RESEARCH | Facility: HOSPITAL | Age: 66
End: 2021-05-06

## 2021-05-10 ENCOUNTER — PATIENT MESSAGE (OUTPATIENT)
Dept: RESEARCH | Facility: HOSPITAL | Age: 66
End: 2021-05-10

## 2021-05-13 PROBLEM — N18.9 CHRONIC KIDNEY DISEASE: Status: ACTIVE | Noted: 2021-05-13

## 2021-06-08 DIAGNOSIS — E11.9 TYPE 2 DIABETES MELLITUS WITHOUT COMPLICATION, WITH LONG-TERM CURRENT USE OF INSULIN: ICD-10-CM

## 2021-06-08 DIAGNOSIS — Z79.4 TYPE 2 DIABETES MELLITUS WITHOUT COMPLICATION, WITH LONG-TERM CURRENT USE OF INSULIN: ICD-10-CM

## 2021-06-08 RX ORDER — LANCETS
EACH MISCELLANEOUS
Qty: 300 EACH | Refills: 0 | Status: SHIPPED | OUTPATIENT
Start: 2021-06-08 | End: 2021-07-20

## 2021-07-01 ENCOUNTER — PATIENT MESSAGE (OUTPATIENT)
Dept: ADMINISTRATIVE | Facility: OTHER | Age: 66
End: 2021-07-01

## 2021-07-09 ENCOUNTER — TELEPHONE (OUTPATIENT)
Dept: FAMILY MEDICINE | Facility: CLINIC | Age: 66
End: 2021-07-09

## 2021-07-09 DIAGNOSIS — Z79.4 TYPE 2 DIABETES MELLITUS WITHOUT COMPLICATION, WITH LONG-TERM CURRENT USE OF INSULIN: ICD-10-CM

## 2021-07-09 DIAGNOSIS — E11.9 TYPE 2 DIABETES MELLITUS WITHOUT COMPLICATION, WITH LONG-TERM CURRENT USE OF INSULIN: ICD-10-CM

## 2021-07-20 RX ORDER — INSULIN PUMP SYRINGE, 3 ML
EACH MISCELLANEOUS
Qty: 1 EACH | Refills: 0 | Status: SHIPPED | OUTPATIENT
Start: 2021-07-20 | End: 2021-12-22 | Stop reason: SDUPTHER

## 2021-07-20 RX ORDER — LANCETS
1 EACH MISCELLANEOUS
Qty: 300 EACH | Refills: 3 | Status: SHIPPED | OUTPATIENT
Start: 2021-07-20

## 2021-09-09 DIAGNOSIS — L40.9 PSORIASIS: ICD-10-CM

## 2021-09-09 DIAGNOSIS — Z79.899 HIGH RISK MEDICATION USE: ICD-10-CM

## 2021-09-09 DIAGNOSIS — E78.5 HYPERLIPIDEMIA: ICD-10-CM

## 2021-09-09 RX ORDER — INSULIN ASPART 100 [IU]/ML
20 INJECTION, SOLUTION INTRAVENOUS; SUBCUTANEOUS
Qty: 2 BOX | Refills: 0 | Status: SHIPPED | OUTPATIENT
Start: 2021-09-09 | End: 2021-12-06 | Stop reason: SDUPTHER

## 2021-09-09 RX ORDER — INSULIN DETEMIR 100 [IU]/ML
80 INJECTION, SOLUTION SUBCUTANEOUS NIGHTLY
Qty: 75 ML | Refills: 0 | Status: SHIPPED | OUTPATIENT
Start: 2021-09-09 | End: 2021-12-06 | Stop reason: SDUPTHER

## 2021-09-09 RX ORDER — LISINOPRIL 40 MG/1
40 TABLET ORAL DAILY
Qty: 90 TABLET | Refills: 0 | Status: SHIPPED | OUTPATIENT
Start: 2021-09-09 | End: 2021-11-01 | Stop reason: SDUPTHER

## 2021-09-09 RX ORDER — ATORVASTATIN CALCIUM 80 MG/1
80 TABLET, FILM COATED ORAL NIGHTLY
Qty: 90 TABLET | Refills: 1 | Status: CANCELLED | OUTPATIENT
Start: 2021-09-09

## 2021-09-09 RX ORDER — METHOTREXATE 2.5 MG/1
10 TABLET ORAL
Qty: 16 TABLET | Refills: 3 | Status: CANCELLED | OUTPATIENT
Start: 2021-09-09 | End: 2022-09-09

## 2021-11-11 ENCOUNTER — PATIENT MESSAGE (OUTPATIENT)
Dept: ADMINISTRATIVE | Facility: HOSPITAL | Age: 66
End: 2021-11-11
Payer: MEDICARE

## 2021-11-29 ENCOUNTER — TELEPHONE (OUTPATIENT)
Dept: FAMILY MEDICINE | Facility: CLINIC | Age: 66
End: 2021-11-29
Payer: MEDICARE

## 2021-11-30 ENCOUNTER — PATIENT OUTREACH (OUTPATIENT)
Dept: ADMINISTRATIVE | Facility: HOSPITAL | Age: 66
End: 2021-11-30
Payer: MEDICARE

## 2021-12-03 ENCOUNTER — TELEPHONE (OUTPATIENT)
Dept: FAMILY MEDICINE | Facility: CLINIC | Age: 66
End: 2021-12-03
Payer: MEDICARE

## 2021-12-06 ENCOUNTER — TELEPHONE (OUTPATIENT)
Dept: FAMILY MEDICINE | Facility: CLINIC | Age: 66
End: 2021-12-06
Payer: MEDICARE

## 2021-12-06 RX ORDER — INSULIN ASPART 100 [IU]/ML
20 INJECTION, SOLUTION INTRAVENOUS; SUBCUTANEOUS
Qty: 2 EACH | Refills: 0 | Status: SHIPPED | OUTPATIENT
Start: 2021-12-06 | End: 2022-01-05 | Stop reason: SDUPTHER

## 2021-12-06 RX ORDER — INSULIN DETEMIR 100 [IU]/ML
80 INJECTION, SOLUTION SUBCUTANEOUS NIGHTLY
Qty: 75 ML | Refills: 0 | Status: SHIPPED | OUTPATIENT
Start: 2021-12-06 | End: 2021-12-23 | Stop reason: SDUPTHER

## 2021-12-22 RX ORDER — BLOOD-GLUCOSE METER
EACH MISCELLANEOUS
Qty: 1 EACH | Refills: 0 | Status: SHIPPED | OUTPATIENT
Start: 2021-12-22 | End: 2022-10-27

## 2021-12-27 RX ORDER — INSULIN DETEMIR 100 [IU]/ML
80 INJECTION, SOLUTION SUBCUTANEOUS NIGHTLY
Qty: 72 ML | Refills: 0 | Status: SHIPPED | OUTPATIENT
Start: 2021-12-27 | End: 2022-01-05 | Stop reason: SDUPTHER

## 2022-01-05 ENCOUNTER — OFFICE VISIT (OUTPATIENT)
Dept: FAMILY MEDICINE | Facility: CLINIC | Age: 67
End: 2022-01-05
Payer: MEDICARE

## 2022-01-05 VITALS
DIASTOLIC BLOOD PRESSURE: 70 MMHG | HEART RATE: 68 BPM | OXYGEN SATURATION: 97 % | SYSTOLIC BLOOD PRESSURE: 136 MMHG | BODY MASS INDEX: 34.26 KG/M2 | WEIGHT: 218.25 LBS | HEIGHT: 67 IN

## 2022-01-05 DIAGNOSIS — Z89.511 S/P BKA (BELOW KNEE AMPUTATION), RIGHT: Chronic | ICD-10-CM

## 2022-01-05 DIAGNOSIS — Z79.4 TYPE 2 DIABETES MELLITUS WITH PROLIFERATIVE RETINOPATHY OF BOTH EYES, WITH LONG-TERM CURRENT USE OF INSULIN, MACULAR EDEMA PRESENCE UNSPECIFIED, UNSPECIFIED PROLIFERATIVE RETINOPATHY TYPE: Primary | ICD-10-CM

## 2022-01-05 DIAGNOSIS — Z85.038 PERSONAL HISTORY OF COLON CANCER: ICD-10-CM

## 2022-01-05 DIAGNOSIS — Z86.010 HISTORY OF COLON POLYPS: ICD-10-CM

## 2022-01-05 DIAGNOSIS — I50.32 CHRONIC HEART FAILURE WITH PRESERVED EJECTION FRACTION: ICD-10-CM

## 2022-01-05 DIAGNOSIS — I10 ESSENTIAL HYPERTENSION: ICD-10-CM

## 2022-01-05 DIAGNOSIS — E66.09 CLASS 1 OBESITY DUE TO EXCESS CALORIES WITH SERIOUS COMORBIDITY AND BODY MASS INDEX (BMI) OF 34.0 TO 34.9 IN ADULT: ICD-10-CM

## 2022-01-05 DIAGNOSIS — E78.2 MIXED HYPERLIPIDEMIA: Chronic | ICD-10-CM

## 2022-01-05 DIAGNOSIS — E11.3593 TYPE 2 DIABETES MELLITUS WITH PROLIFERATIVE RETINOPATHY OF BOTH EYES, WITH LONG-TERM CURRENT USE OF INSULIN, MACULAR EDEMA PRESENCE UNSPECIFIED, UNSPECIFIED PROLIFERATIVE RETINOPATHY TYPE: Primary | ICD-10-CM

## 2022-01-05 DIAGNOSIS — I73.9 PAD (PERIPHERAL ARTERY DISEASE): Chronic | ICD-10-CM

## 2022-01-05 DIAGNOSIS — N40.1 BENIGN PROSTATIC HYPERPLASIA WITH LOWER URINARY TRACT SYMPTOMS, SYMPTOM DETAILS UNSPECIFIED: Chronic | ICD-10-CM

## 2022-01-05 PROBLEM — E66.811 CLASS 1 OBESITY DUE TO EXCESS CALORIES WITH SERIOUS COMORBIDITY AND BODY MASS INDEX (BMI) OF 34.0 TO 34.9 IN ADULT: Status: ACTIVE | Noted: 2022-01-05

## 2022-01-05 PROBLEM — E66.811 CLASS 1 OBESITY DUE TO EXCESS CALORIES WITH SERIOUS COMORBIDITY AND BODY MASS INDEX (BMI) OF 34.0 TO 34.9 IN ADULT: Chronic | Status: ACTIVE | Noted: 2022-01-05

## 2022-01-05 PROCEDURE — 99499 UNLISTED E&M SERVICE: CPT | Mod: S$GLB,,, | Performed by: FAMILY MEDICINE

## 2022-01-05 PROCEDURE — 3075F PR MOST RECENT SYSTOLIC BLOOD PRESS GE 130-139MM HG: ICD-10-PCS | Mod: HCNC,CPTII,S$GLB, | Performed by: FAMILY MEDICINE

## 2022-01-05 PROCEDURE — 3008F BODY MASS INDEX DOCD: CPT | Mod: HCNC,CPTII,S$GLB, | Performed by: FAMILY MEDICINE

## 2022-01-05 PROCEDURE — 3078F PR MOST RECENT DIASTOLIC BLOOD PRESSURE < 80 MM HG: ICD-10-PCS | Mod: HCNC,CPTII,S$GLB, | Performed by: FAMILY MEDICINE

## 2022-01-05 PROCEDURE — 3051F PR MOST RECENT HEMOGLOBIN A1C LEVEL 7.0 - < 8.0%: ICD-10-PCS | Mod: HCNC,CPTII,S$GLB, | Performed by: FAMILY MEDICINE

## 2022-01-05 PROCEDURE — 99214 OFFICE O/P EST MOD 30 MIN: CPT | Mod: HCNC,S$GLB,, | Performed by: FAMILY MEDICINE

## 2022-01-05 PROCEDURE — 1101F PR PT FALLS ASSESS DOC 0-1 FALLS W/OUT INJ PAST YR: ICD-10-PCS | Mod: HCNC,CPTII,S$GLB, | Performed by: FAMILY MEDICINE

## 2022-01-05 PROCEDURE — 3288F PR FALLS RISK ASSESSMENT DOCUMENTED: ICD-10-PCS | Mod: HCNC,CPTII,S$GLB, | Performed by: FAMILY MEDICINE

## 2022-01-05 PROCEDURE — 1159F MED LIST DOCD IN RCRD: CPT | Mod: HCNC,CPTII,S$GLB, | Performed by: FAMILY MEDICINE

## 2022-01-05 PROCEDURE — 4010F ACE/ARB THERAPY RXD/TAKEN: CPT | Mod: HCNC,CPTII,S$GLB, | Performed by: FAMILY MEDICINE

## 2022-01-05 PROCEDURE — 3288F FALL RISK ASSESSMENT DOCD: CPT | Mod: HCNC,CPTII,S$GLB, | Performed by: FAMILY MEDICINE

## 2022-01-05 PROCEDURE — 1159F PR MEDICATION LIST DOCUMENTED IN MEDICAL RECORD: ICD-10-PCS | Mod: HCNC,CPTII,S$GLB, | Performed by: FAMILY MEDICINE

## 2022-01-05 PROCEDURE — 1125F AMNT PAIN NOTED PAIN PRSNT: CPT | Mod: HCNC,CPTII,S$GLB, | Performed by: FAMILY MEDICINE

## 2022-01-05 PROCEDURE — 1160F PR REVIEW ALL MEDS BY PRESCRIBER/CLIN PHARMACIST DOCUMENTED: ICD-10-PCS | Mod: HCNC,CPTII,S$GLB, | Performed by: FAMILY MEDICINE

## 2022-01-05 PROCEDURE — 4010F PR ACE/ARB THEARPY RXD/TAKEN: ICD-10-PCS | Mod: HCNC,CPTII,S$GLB, | Performed by: FAMILY MEDICINE

## 2022-01-05 PROCEDURE — 99214 PR OFFICE/OUTPT VISIT, EST, LEVL IV, 30-39 MIN: ICD-10-PCS | Mod: HCNC,S$GLB,, | Performed by: FAMILY MEDICINE

## 2022-01-05 PROCEDURE — 1101F PT FALLS ASSESS-DOCD LE1/YR: CPT | Mod: HCNC,CPTII,S$GLB, | Performed by: FAMILY MEDICINE

## 2022-01-05 PROCEDURE — 3078F DIAST BP <80 MM HG: CPT | Mod: HCNC,CPTII,S$GLB, | Performed by: FAMILY MEDICINE

## 2022-01-05 PROCEDURE — 99499 RISK ADDL DX/OHS AUDIT: ICD-10-PCS | Mod: S$GLB,,, | Performed by: FAMILY MEDICINE

## 2022-01-05 PROCEDURE — 3051F HG A1C>EQUAL 7.0%<8.0%: CPT | Mod: HCNC,CPTII,S$GLB, | Performed by: FAMILY MEDICINE

## 2022-01-05 PROCEDURE — 99999 PR PBB SHADOW E&M-EST. PATIENT-LVL III: ICD-10-PCS | Mod: PBBFAC,HCNC,, | Performed by: FAMILY MEDICINE

## 2022-01-05 PROCEDURE — 3075F SYST BP GE 130 - 139MM HG: CPT | Mod: HCNC,CPTII,S$GLB, | Performed by: FAMILY MEDICINE

## 2022-01-05 PROCEDURE — 3008F PR BODY MASS INDEX (BMI) DOCUMENTED: ICD-10-PCS | Mod: HCNC,CPTII,S$GLB, | Performed by: FAMILY MEDICINE

## 2022-01-05 PROCEDURE — 1160F RVW MEDS BY RX/DR IN RCRD: CPT | Mod: HCNC,CPTII,S$GLB, | Performed by: FAMILY MEDICINE

## 2022-01-05 PROCEDURE — 99999 PR PBB SHADOW E&M-EST. PATIENT-LVL III: CPT | Mod: PBBFAC,HCNC,, | Performed by: FAMILY MEDICINE

## 2022-01-05 PROCEDURE — 1125F PR PAIN SEVERITY QUANTIFIED, PAIN PRESENT: ICD-10-PCS | Mod: HCNC,CPTII,S$GLB, | Performed by: FAMILY MEDICINE

## 2022-01-05 RX ORDER — INSULIN DETEMIR 100 [IU]/ML
50 INJECTION, SOLUTION SUBCUTANEOUS 2 TIMES DAILY
Qty: 90 ML | Refills: 1 | Status: SHIPPED | OUTPATIENT
Start: 2022-01-05 | End: 2022-07-08 | Stop reason: SDUPTHER

## 2022-01-05 RX ORDER — LISINOPRIL 40 MG/1
40 TABLET ORAL DAILY
Qty: 90 TABLET | Refills: 3 | Status: SHIPPED | OUTPATIENT
Start: 2022-01-05 | End: 2023-01-22

## 2022-01-05 RX ORDER — CARVEDILOL 25 MG/1
25 TABLET ORAL 2 TIMES DAILY WITH MEALS
Qty: 180 TABLET | Refills: 3 | Status: SHIPPED | OUTPATIENT
Start: 2022-01-05 | End: 2023-02-22

## 2022-01-05 RX ORDER — INSULIN ASPART 100 [IU]/ML
20 INJECTION, SOLUTION INTRAVENOUS; SUBCUTANEOUS
Qty: 54 ML | Refills: 1 | Status: SHIPPED | OUTPATIENT
Start: 2022-01-05 | End: 2022-08-15 | Stop reason: SDUPTHER

## 2022-01-05 RX ORDER — LANCETS 33 GAUGE
EACH MISCELLANEOUS 3 TIMES DAILY
COMMUNITY
Start: 2021-12-29

## 2022-01-05 NOTE — PROGRESS NOTES
EST PATIENT VISIT FAMILY MEDICINE    CC:   Chief Complaint   Patient presents with    Diabetes    Hypertension       HPI: Brando Melgoza  is a 66 y.o. male:    Specialists  Nephrology - Dr Aquino  Urology - Dr Eller  Cardiology - Dr Tineo    Patient is here for routine follow-up.  He presents alone.  He reports that his blood pressure is well controlled when he checks it occasionally at the store.  He has been following up with his cardiologist as recommended.  He is taking all of his blood pressure medications as prescribed.  He denies signs or symptoms of low blood pressure at home.  He needs refills.    He reports fasting blood sugars of 150s to 180s.  He is taking Levemir 50 units b.i.d..  He is also taking NovoLog 20 units with meals.  He denies any signs or symptoms of low blood sugars.  He is due for an eye exam.  He reports he has not had 1 in last year.    He was taking methotrexate for his psoriasis which has been flaring up.  He ran out of his methotrexate and has not followed up with his dermatologist yet.    Due for screening c scope. Would like this done in [place] where he had his last one    ROS: Review of Systems   Constitutional: Negative for fever.   Respiratory: Negative for shortness of breath.    Cardiovascular: Negative for chest pain.       PMHX:   Past Medical History:   Diagnosis Date    Cancer     colon    Cataract     Chronic kidney disease     Diabetes mellitus     Diabetes mellitus, type 2     Hyperlipidemia     Hypertension        PSHX:   Past Surgical History:   Procedure Laterality Date    AMPUTATION Right     below the knee    CARDIAC CATHETERIZATION      CATARACT EXTRACTION      COLON SURGERY      COLONOSCOPY      COLONOSCOPY N/A 5/22/2018    Procedure: COLONOSCOPY;  Surgeon: Hallie Higuera MD;  Location: [facility];  Service: Endoscopy;  Laterality: N/A;    COLONOSCOPY N/A 6/12/2018    Procedure: COLONOSCOPY;  Surgeon: Hallie Higuera MD;   "Location: [facility];  Service: Endoscopy;  Laterality: N/A;    SKIN BIOPSY         FAMHX:   Family History   Problem Relation Age of Onset    Diabetes Mother     Hypertension Father     Diabetes Sister     Diabetes Brother     Hypertension Brother     No Known Problems Paternal Grandmother     No Known Problems Paternal Grandfather     No Known Problems Maternal Grandmother     No Known Problems Maternal Grandfather        SOCHX:   Social History     Socioeconomic History    Marital status:     Number of children: 1    Years of education: 12   Tobacco Use    Smoking status: Never Smoker    Smokeless tobacco: Never Used   Substance and Sexual Activity    Alcohol use: Yes     Comment: occ    Drug use: No    Sexual activity: Yes     Partners: Female       ALLERGIES: Review of patient's allergies indicates:  No Known Allergies    MEDS:   Current Outpatient Medications:     albuterol (PROVENTIL/VENTOLIN HFA) 90 mcg/actuation inhaler, Inhale 1-2 puffs into the lungs every 6 (six) hours as needed for Wheezing. Rescue, Disp: 1 Inhaler, Rfl: 0    alfuzosin (UROXATRAL) 10 mg Tb24, Take 1 tablet (10 mg total) by mouth daily with breakfast., Disp: 90 tablet, Rfl: 3    amLODIPine (NORVASC) 10 MG tablet, Take 1 tablet (10 mg total) by mouth once daily., Disp: 90 tablet, Rfl: 3    aspirin (ECOTRIN) 81 MG EC tablet, Take 81 mg by mouth once daily., Disp: , Rfl:     atorvastatin (LIPITOR) 80 MG tablet, Take 1 tablet (80 mg total) by mouth every evening., Disp: 90 tablet, Rfl: 3    BD JERRY 2ND GEN PEN NEEDLE 32 gauge x 5/32" Ndle, USE AS DIRECTED THREE TIMES DAILY, Disp: 100 each, Rfl: 0    betamethasone valerate 0.1% (VALISONE) 0.1 % Crea, Apply topically once daily., Disp: 45 g, Rfl: 2    clotrimazole (LOTRIMIN) 1 % cream, Apply 1 application topically 2 (two) times daily., Disp: , Rfl:     finasteride (PROSCAR) 5 mg tablet, Take 1 tablet (5 mg total) by mouth once daily., Disp: 90 tablet, Rfl: " 3    fluocinolone (SYNALAR) 0.01 % external solution, APPLY  SOLUTION TOPICALLY TO SCALP ONCE DAILY IN THE EVENING, Disp: 60 mL, Rfl: 1    folic acid (FOLVITE) 1 MG tablet, Take 1 tablet (1 mg total) by mouth once daily., Disp: 90 tablet, Rfl: 3    furosemide (LASIX) 40 MG tablet, TAKE ONE TABLET BY MOUTH ONCE DAILY (UP  TO  4  TABLETS  A  DAY  EXTRA,  ONLY  AS  NEEDED  FOR  SWELLING), Disp: 180 tablet, Rfl: 11    glucose 4 GM chewable tablet, Take 4 tablets (16 g total) by mouth as needed for Low blood sugar., Disp: 30 tablet, Rfl: 12    HYDROcodone-acetaminophen (NORCO) 5-325 mg per tablet, Take 1 tablet by mouth every 6 (six) hours as needed for Pain., Disp: 28 tablet, Rfl: 0    ketoconazole (NIZORAL) 2 % cream, APPLY A THIN LAYER OF  CREAM TOPICALLY TO AFFECTED AREA (FACE) TWICE DAILY, Disp: 120 g, Rfl: 3    ketoconazole (NIZORAL) 2 % shampoo, USE SHAMPOO TOPICALLY ON SCALP TWICE A WEEK, Disp: 120 mL, Rfl: 5    lancets Misc, 1 lancet by Misc.(Non-Drug; Combo Route) route 3 (three) times daily before meals., Disp: 300 each, Rfl: 3    linaCLOtide (LINZESS) 145 mcg Cap capsule, Take 1 capsule (145 mcg total) by mouth once daily., Disp: 30 capsule, Rfl: 3    methotrexate 2.5 MG Tab, Take 4 tablets (10 mg total) by mouth every 7 days., Disp: 16 tablet, Rfl: 3    polyethylene glycol (GLYCOLAX) 17 gram/dose powder, Take 17 g by mouth once daily., Disp: 1700 g, Rfl: 11    POLYETHYLENE GLYCOL 3350 (MIRALAX ORAL), Take by mouth., Disp: , Rfl:     spironolactone (ALDACTONE) 25 MG tablet, Take 1 tablet (25 mg total) by mouth once daily., Disp: 90 tablet, Rfl: 3    triamcinolone acetonide 0.1% (KENALOG) 0.1 % cream, Mix in a 50:50 portion with your Ketoconazole cream and apply nightly to the affected areas., Disp: 45 g, Rfl: 1    TRILIPIX 135 mg CpDR, TAKE 1 CAPSULE BY MOUTH EVERY DAY, Disp: 30 capsule, Rfl: 6    vardenafiL (LEVITRA) 20 MG tablet, Take 1 tablet (20 mg total) by mouth daily as needed for  "Erectile Dysfunction., Disp: 15 tablet, Rfl: 7    blood sugar diagnostic Strp, Test blood sugar 3 times daily, Disp: 300 strip, Rfl: 3    carvediloL (COREG) 25 MG tablet, Take 1 tablet (25 mg total) by mouth 2 (two) times daily with meals., Disp: 180 tablet, Rfl: 3    insulin aspart U-100 (NOVOLOG FLEXPEN U-100 INSULIN) 100 unit/mL (3 mL) InPn pen, Inject 20 Units into the skin 3 (three) times daily with meals., Disp: 54 mL, Rfl: 1    insulin detemir U-100 (LEVEMIR FLEXTOUCH U-100 INSULN) 100 unit/mL (3 mL) InPn pen, Inject 50 Units into the skin 2 (two) times daily., Disp: 90 mL, Rfl: 1    lisinopriL (PRINIVIL,ZESTRIL) 40 MG tablet, Take 1 tablet (40 mg total) by mouth once daily., Disp: 90 tablet, Rfl: 3    SHINGRIX, PF, 50 mcg/0.5 mL injection, , Disp: , Rfl:     TRUE METRIX GLUCOSE METER kit, USE  AS  INSTRUCTED, Disp: 1 each, Rfl: 0    TRUEPLUS LANCETS 33 gauge Misc, Apply topically 3 (three) times daily., Disp: , Rfl:     OBJECTIVE:   Vitals:    01/05/22 0811 01/05/22 0907   BP: (!) 140/78 136/70   BP Location: Left arm    Patient Position: Sitting    BP Method: Medium (Manual)    Pulse: 68    SpO2: 97%    Weight: 99 kg (218 lb 4.1 oz)    Height: 5' 7" (1.702 m)      Body mass index is 34.18 kg/m².      Physical Exam  Vitals and nursing note reviewed.   Constitutional:       Appearance: Normal appearance.   HENT:      Head: Normocephalic and atraumatic.   Eyes:      General: No scleral icterus.     Extraocular Movements: Extraocular movements intact.   Pulmonary:      Effort: Pulmonary effort is normal.   Musculoskeletal:      Comments: Right BKA - prosthesis   Neurological:      Mental Status: He is alert.           LABS:   A1C:  Recent Labs   Lab 05/13/21  1630   Hemoglobin A1C 7.4 H  7.4 H     CBC:  Recent Labs   Lab 01/26/21  0905   WBC 7.02   RBC 4.49 L   Hemoglobin 14.0   Hematocrit 41.4   Platelets 242   MCV 92   MCH 31.2 H   MCHC 33.8     CMP:  Recent Labs   Lab 05/13/21  1630   Glucose 156 H "   Calcium 9.3   Albumin 4.1   Total Protein 7.3   Sodium 143   Potassium 5.0   CO2 26   Chloride 108   BUN 20   Creatinine 1.2   Alkaline Phosphatase 96   ALT 15   AST 18   Total Bilirubin 0.5     LIPIDS:  Recent Labs   Lab 12/30/20  0827   HDL 26 L   Cholesterol 135   Triglycerides 116   LDL Cholesterol 85.8   HDL/Cholesterol Ratio 19.3 L   Non-HDL Cholesterol 109   Total Cholesterol/HDL Ratio 5.2 H     TSH:          ASSESSMENT & PLAN:    Problem List Items Addressed This Visit        Cardiac/Vascular    (HFpEF) heart failure with preserved ejection fraction (Chronic)    Overview     Followed by Cardiology, Dr Tineo         Relevant Medications    carvediloL (COREG) 25 MG tablet    lisinopriL (PRINIVIL,ZESTRIL) 40 MG tablet    Essential hypertension (Chronic)    Overview     Well controlled. Continue current regimen           Relevant Medications    carvediloL (COREG) 25 MG tablet    lisinopriL (PRINIVIL,ZESTRIL) 40 MG tablet    HLD (hyperlipidemia) (Chronic)    Overview     Continue statin         PAD (peripheral artery disease) (Chronic)    Overview     Continue statin, ASA            Renal/    BPH (benign prostatic hyperplasia) (Chronic)    Overview     Followed by Urology            Oncology    Personal history of colon cancer    Relevant Orders    Case Request Endoscopy: COLONOSCOPY (Completed)       Endocrine    Class 1 obesity due to excess calories with serious comorbidity and body mass index (BMI) of 34.0 to 34.9 in adult (Chronic)    Overview     The patient is asked to make an attempt to improve diet and exercise patterns to aid in medical management of this problem.         DM (diabetes mellitus) - Primary (Chronic)    Overview     Well controlled. Continue current regimen           Relevant Medications    TRUEPLUS LANCETS 33 gauge Misc    insulin aspart U-100 (NOVOLOG FLEXPEN U-100 INSULIN) 100 unit/mL (3 mL) InPn pen    insulin detemir U-100 (LEVEMIR FLEXTOUCH U-100 INSULN) 100 unit/mL (3 mL)  InPn pen    Other Relevant Orders    CBC Auto Differential    Comprehensive Metabolic Panel    Hemoglobin A1C    Lipid Panel    Ambulatory referral/consult to Optometry       Orthopedic    S/P BKA (below knee amputation), right (Chronic)    Overview     With prosthesis           Other Visit Diagnoses     History of colon polyps        Relevant Orders    Case Request Endoscopy: COLONOSCOPY (Completed)          Follow up in about 6 months (around 7/5/2022) for diabetes, blood pressure.      RTC/ED precautions discussed where applicable.   Encouraged patient to let me know if there are any further questions/concerns.     Advise patient/caretaker to check with insurance regarding orders to avoid unexpected fees/costs.     The patient/caretaker indicates understanding of these issues and agrees with the plan.    Dr. Preethi Willard MD  Family Medicine

## 2022-01-06 ENCOUNTER — TELEPHONE (OUTPATIENT)
Dept: FAMILY MEDICINE | Facility: CLINIC | Age: 67
End: 2022-01-06
Payer: MEDICARE

## 2022-01-06 NOTE — TELEPHONE ENCOUNTER
----- Message from Preethi Willard MD sent at 1/5/2022  9:01 PM CST -----  Please call patient and let him know his sugars have gone up. I recommend he increase his Levemir to 52 units twice daily and increase Novolog to 22 units with meals. He should try to limit portions of sweets and carbs.     The rest of his blood tests are normal overall. He should follow up in 6 months as scheduled.     Let me know if there are questions/concerns.     Thanks team,   Dr Willard

## 2022-01-12 DIAGNOSIS — Z79.4 TYPE 2 DIABETES MELLITUS WITHOUT COMPLICATION, WITH LONG-TERM CURRENT USE OF INSULIN: ICD-10-CM

## 2022-01-12 DIAGNOSIS — E11.9 TYPE 2 DIABETES MELLITUS WITHOUT COMPLICATION, WITH LONG-TERM CURRENT USE OF INSULIN: ICD-10-CM

## 2022-01-26 DIAGNOSIS — Z79.4 TYPE 2 DIABETES MELLITUS WITHOUT COMPLICATION, WITH LONG-TERM CURRENT USE OF INSULIN: ICD-10-CM

## 2022-01-26 DIAGNOSIS — E11.9 TYPE 2 DIABETES MELLITUS WITHOUT COMPLICATION, WITH LONG-TERM CURRENT USE OF INSULIN: ICD-10-CM

## 2022-02-02 DIAGNOSIS — Z79.4 TYPE 2 DIABETES MELLITUS WITHOUT COMPLICATION, WITH LONG-TERM CURRENT USE OF INSULIN: ICD-10-CM

## 2022-02-02 DIAGNOSIS — E11.9 TYPE 2 DIABETES MELLITUS WITHOUT COMPLICATION, WITH LONG-TERM CURRENT USE OF INSULIN: ICD-10-CM

## 2022-02-15 ENCOUNTER — PATIENT OUTREACH (OUTPATIENT)
Dept: ADMINISTRATIVE | Facility: OTHER | Age: 67
End: 2022-02-15
Payer: MEDICARE

## 2022-02-15 NOTE — PROGRESS NOTES
Health Maintenance Due   Topic Date Due    Foot Exam  05/14/2020    Colorectal Cancer Screening  06/12/2021     Updates were requested from care everywhere.  Chart was reviewed for overdue Proactive [facility] Encounters (RENE) topics (CRS, Breast Cancer Screening, Eye exam)  Health Maintenance has been updated.  LINKS immunization registry triggered.  Immunizations were reconciled.

## 2022-02-16 ENCOUNTER — CLINICAL SUPPORT (OUTPATIENT)
Dept: DIABETES | Facility: CLINIC | Age: 67
End: 2022-02-16
Payer: MEDICARE

## 2022-02-16 DIAGNOSIS — Z79.4 TYPE 2 DIABETES MELLITUS WITHOUT COMPLICATION, WITH LONG-TERM CURRENT USE OF INSULIN: ICD-10-CM

## 2022-02-16 DIAGNOSIS — E11.9 TYPE 2 DIABETES MELLITUS WITHOUT COMPLICATION, WITH LONG-TERM CURRENT USE OF INSULIN: ICD-10-CM

## 2022-02-16 PROCEDURE — G0108 DIAB MANAGE TRN  PER INDIV: HCPCS | Mod: S$GLB,,, | Performed by: STUDENT IN AN ORGANIZED HEALTH CARE EDUCATION/TRAINING PROGRAM

## 2022-02-16 PROCEDURE — G0108 PR DIAB MANAGE TRN  PER INDIV: ICD-10-PCS | Mod: S$GLB,,, | Performed by: STUDENT IN AN ORGANIZED HEALTH CARE EDUCATION/TRAINING PROGRAM

## 2022-02-16 NOTE — PROGRESS NOTES
Diabetes Care Specialist Progress Note  Author: Beatriz Rodriguez RN  Date: 2/16/2022    Program Intake  Reason for Diabetes Program Visit:: Initial Diabetes Assessment  Current diabetes risk level:: low  In the last 12 months, have you:: none    Lab Results   Component Value Date    HGBA1C 7.8 (H) 01/05/2022        Pt is s/p Right BKA from about 6 yrs ago. Pt states he takes Levemir 52 units twice a day and Novolog 22 units before meals about 2x/day.  Clinical    Patient Health Rating  Compared to other people your age, how would you rate your health?: Fair    Problem Review  Reviewed Problem List with Patient: yes  Active comorbidities affecting diabetes self-care.: yes  Comorbidities: Cardiovascular Disease,Other (comment) (obese)    Clinical Assessment  Current Diabetes Treatment: Insulin  Have you ever experienced hypoglycemia (low blood sugar)?: yes  In the last month, how often have you experienced low blood sugar?:  (occassional)  Are you able to tell when your blood sugar is low?: Yes  What symptoms do you experience?: Dizzy/Light-headed,Sweaty  Have you ever been hospitalized because your blood sugar was too low?: no  How do you treat hypoglycemia (low blood sugar)?: 1/2 can soda/fruit juice  Have you ever experienced hyperglycemia (high blood sugar)?: yes  Have you ever been hospitalized because your blood sugar was high?: no    Medication Information  How do you obtain your medications?: Patient drives  How many days a week do you miss your medications?:  (seldom)  Do you sometimes have difficulty refilling your medications?: No  Medication adherence impacting ability to self-manage diabetes?: Yes         Nutritional Status  Diet: Diabetic diet  Change in appetite?: No  Dentation:: Intact  Recent Changes in Weight: No Recent Weight Change  Current nutritional status an area of need that is impacting patient's ability to self-manage diabetes?: Yes    Additional Social History    Support  Does anyone support you  with your diabetes care?: no  Who takes you to your medical appointments?: self  Does the current support meet the patient's needs?: Yes  Is Support an area impacting ability to self-manage diabetes?: No    Access to Mass Media & Technology  Does the patient have access to any of the following devices or technologies?: Smart phone  Media or technology needs impacting ability to self-manage diabetes?: No    Cognitive/Behavioral Health  Alert and Oriented: Yes  Difficulty Thinking: No  Requires Prompting: No  Requires assistance for routine expression?: No  Cognitive or behavioral barriers impacting ability to self-manage diabetes?: No    Culture/Jain  Culture or Cheondoism beliefs that may impact ability to access healthcare: No    Communication  Language preference: English  Hearing Problems: No  Vision Problems: No  Communication needs impacting ability to self-manage diabetes?: No    Health Literacy  Preferred Learning Method: Face to Face,Reading Materials  How often do you need to have someone help you read instructions, pamphlets, or written material from your doctor or pharmacy?: Rarely  Health literacy needs impacting ability to self-manage diabetes?: No      Diabetes Self-Management Skills Assessment    Diabetes Disease Process/Treatment Options  Patient/caregiver able to state what happens when someone has diabetes.: somewhat  Patient/caregiver knows what type of diabetes they have.: no  Patient/caregiver able to identify at least three signs and symptoms of diabetes.: yes  Identified signs and symptoms:: fatigue,increased thirst  Patient able to identify at least three risk factors for diabetes.: yes  Identified risk factors:: family history  Diabetes Disease Process/Treatment Options: Skills Assessment Completed: Yes  Assessment indicates:: Adequate understanding  Area of need?: No    Nutrition/Healthy Eating  Challenges to healthy eating:: lack of will power,portion control,snacking between meals and  at night  Method of carbohydrate measurement:: eyeballing/guessing  Patient can identify foods that impact blood sugar.: yes  Patient-identified foods:: starches (bread, pasta, rice, cereal)  Nutrition/Healthy Eating Skills Assessment Completed:: Yes  Assessment indicates:: Knowledge deficit,Instruction Needed  Area of need?: Yes    Physical Activity/Exercise  Patient's daily activity level:: sedentary  Patient formally exercises outside of work.: no  Reasons for not exercising:: physically unable to exercise currently (right BKA)  Patient can identify forms of physical activity.: yes  Stated forms of physical activity:: any movement performed by muscles that uses energy  Patient can identify reasons why exercise/physical activity is important in diabetes management.: no  Physical Activity/Exercise Skills Assessment Completed: : Yes  Assessment indicates:: Adequate understanding  Area of need?: Yes    Medications  Patient is able to describe current diabetes management routine.: yes  Diabetes management routine:: insulin  Patient is able to identify current diabetes medications, dosages, and appropriate timing of medications.: yes  Patient understands the purpose of the medications taken for diabetes.: yes  Patient reports problems or concerns with current medication regimen.: no  Medication Skills Assessment Completed:: Yes  Assessment indicates:: Adequate understanding  Area of need?: No    Home Blood Glucose Monitoring  Patient states that blood sugar is checked at home daily.: yes  Monitoring Method:: home glucometer  How often do you check your blood sugar?: Once a day  When do you check your blood sugar?: Before breakfast  When you check what is your typical blood sugar range? :   Blood glucose logs:: no,encouraged to keep logs,encouraged to bring logs to provider visits  Blood glucose logs reviewed today?: no  Home Blood Glucose Monitoring Skills Assessment Completed: : Yes  Assessment indicates::  Adequate understanding  Area of need?: No    Acute Complications  Acute Complications Skills Assessment Completed: : No  Deffered due to:: Time    Chronic Complications  Chronic Complications Skills Assessment Completed: : No  Deferred due to:: Time    Psychosocial/Coping  Psychosocial/Coping Skills Assessment Completed: : No  Deffered due to:: Time      Diabetes Self Support Plan         Assessment Summary and Plan    Based on today's diabetes care assessment, the following areas of need were identified:      Social 2/16/2022   Support No   Access to Mass Media/Tech No   Cognitive/Behavioral Health No   Culture/[employer] No   Communication No   Health Literacy No        Clinical 2/16/2022   Medication Adherence Yes   Nutritional Status Yes        Diabetes Self-Management Skills 2/16/2022   Diabetes Disease Process/Treatment Options No   Nutrition/Healthy Eating Yes   Physical Activity/Exercise Yes   Medication No   Home Blood Glucose Monitoring No          Today's interventions were provided through individual discussion, instruction, and written materials were provided.      Patient verbalized understanding of instruction and written materials.  Pt was able to return back demonstration of instructions today. Patient understood key points, needs reinforcement and further instruction.     Diabetes Self-Management Care Plan:    Today's Diabetes Self-Management Care Plan was developed with Brando's input. Brando has agreed to work toward the following goal(s) to improve his/her overall diabetes control.      Care Plan: Diabetes Management   Updates made since 1/17/2022 12:00 AM      Problem: Blood Glucose Self-Monitoring       Goal: monitor blood sugars at least 2x/day    Start Date: 2/16/2022   Priority: Medium      Task: Reviewed the importance of self-monitoring blood glucose and keeping logs. Completed 2/16/2022      Task: Provided patient with blood glucose logs, reviewed appropriate timing and frequency to SMBG,  education on parameters on when to notify provider and advised patient to bring logs to all appts with PCP/Endocrinologist/Diabetes Care Specialist. Completed 2/16/2022      Task: Discussed ways to minimize pain when monitoring blood glucose. Completed 2/16/2022          Follow Up Plan     No follow-ups on file.    Today's care plan and follow up schedule was discussed with patient.  Brando verbalized understanding of the care plan, goals, and agrees to follow up plan.        The patient was encouraged to communicate with his/her health care provider/physician and care team regarding his/her condition(s) and treatment.  I provided the patient with my contact information today and encouraged to contact me via phone or [facility] Patient Portal as needed.     Length of Visit   Total Time: 0 Minutes

## 2022-07-08 ENCOUNTER — OFFICE VISIT (OUTPATIENT)
Dept: FAMILY MEDICINE | Facility: CLINIC | Age: 67
End: 2022-07-08
Payer: MEDICARE

## 2022-07-08 VITALS
DIASTOLIC BLOOD PRESSURE: 62 MMHG | BODY MASS INDEX: 33.73 KG/M2 | SYSTOLIC BLOOD PRESSURE: 130 MMHG | HEART RATE: 84 BPM | HEIGHT: 67 IN | OXYGEN SATURATION: 98 % | WEIGHT: 214.88 LBS

## 2022-07-08 DIAGNOSIS — E11.3593 TYPE 2 DIABETES MELLITUS WITH PROLIFERATIVE RETINOPATHY OF BOTH EYES, WITH LONG-TERM CURRENT USE OF INSULIN, MACULAR EDEMA PRESENCE UNSPECIFIED, UNSPECIFIED PROLIFERATIVE RETINOPATHY TYPE: ICD-10-CM

## 2022-07-08 DIAGNOSIS — I73.9 PAD (PERIPHERAL ARTERY DISEASE): Chronic | ICD-10-CM

## 2022-07-08 DIAGNOSIS — E78.2 MIXED HYPERLIPIDEMIA: Chronic | ICD-10-CM

## 2022-07-08 DIAGNOSIS — I10 ESSENTIAL HYPERTENSION: Primary | Chronic | ICD-10-CM

## 2022-07-08 DIAGNOSIS — Z79.4 TYPE 2 DIABETES MELLITUS WITH PROLIFERATIVE RETINOPATHY OF BOTH EYES, WITH LONG-TERM CURRENT USE OF INSULIN, MACULAR EDEMA PRESENCE UNSPECIFIED, UNSPECIFIED PROLIFERATIVE RETINOPATHY TYPE: ICD-10-CM

## 2022-07-08 PROCEDURE — 3078F DIAST BP <80 MM HG: CPT | Mod: CPTII,S$GLB,, | Performed by: FAMILY MEDICINE

## 2022-07-08 PROCEDURE — 3066F PR DOCUMENTATION OF TREATMENT FOR NEPHROPATHY: ICD-10-PCS | Mod: CPTII,S$GLB,, | Performed by: FAMILY MEDICINE

## 2022-07-08 PROCEDURE — 3060F PR POS MICROALBUMINURIA RESULT DOCUMENTED/REVIEW: ICD-10-PCS | Mod: CPTII,S$GLB,, | Performed by: FAMILY MEDICINE

## 2022-07-08 PROCEDURE — 1160F RVW MEDS BY RX/DR IN RCRD: CPT | Mod: CPTII,S$GLB,, | Performed by: FAMILY MEDICINE

## 2022-07-08 PROCEDURE — 3008F BODY MASS INDEX DOCD: CPT | Mod: CPTII,S$GLB,, | Performed by: FAMILY MEDICINE

## 2022-07-08 PROCEDURE — 4010F PR ACE/ARB THEARPY RXD/TAKEN: ICD-10-PCS | Mod: CPTII,S$GLB,, | Performed by: FAMILY MEDICINE

## 2022-07-08 PROCEDURE — 3075F SYST BP GE 130 - 139MM HG: CPT | Mod: CPTII,S$GLB,, | Performed by: FAMILY MEDICINE

## 2022-07-08 PROCEDURE — 1101F PT FALLS ASSESS-DOCD LE1/YR: CPT | Mod: CPTII,S$GLB,, | Performed by: FAMILY MEDICINE

## 2022-07-08 PROCEDURE — 99214 PR OFFICE/OUTPT VISIT, EST, LEVL IV, 30-39 MIN: ICD-10-PCS | Mod: S$GLB,,, | Performed by: FAMILY MEDICINE

## 2022-07-08 PROCEDURE — 1126F AMNT PAIN NOTED NONE PRSNT: CPT | Mod: CPTII,S$GLB,, | Performed by: FAMILY MEDICINE

## 2022-07-08 PROCEDURE — 3052F PR MOST RECENT HEMOGLOBIN A1C LEVEL 8.0 - < 9.0%: ICD-10-PCS | Mod: CPTII,S$GLB,, | Performed by: FAMILY MEDICINE

## 2022-07-08 PROCEDURE — 99214 OFFICE O/P EST MOD 30 MIN: CPT | Mod: S$GLB,,, | Performed by: FAMILY MEDICINE

## 2022-07-08 PROCEDURE — 99999 PR PBB SHADOW E&M-EST. PATIENT-LVL V: CPT | Mod: PBBFAC,,, | Performed by: FAMILY MEDICINE

## 2022-07-08 PROCEDURE — 3078F PR MOST RECENT DIASTOLIC BLOOD PRESSURE < 80 MM HG: ICD-10-PCS | Mod: CPTII,S$GLB,, | Performed by: FAMILY MEDICINE

## 2022-07-08 PROCEDURE — 1126F PR PAIN SEVERITY QUANTIFIED, NO PAIN PRESENT: ICD-10-PCS | Mod: CPTII,S$GLB,, | Performed by: FAMILY MEDICINE

## 2022-07-08 PROCEDURE — 3288F FALL RISK ASSESSMENT DOCD: CPT | Mod: CPTII,S$GLB,, | Performed by: FAMILY MEDICINE

## 2022-07-08 PROCEDURE — 3060F POS MICROALBUMINURIA REV: CPT | Mod: CPTII,S$GLB,, | Performed by: FAMILY MEDICINE

## 2022-07-08 PROCEDURE — 99999 PR PBB SHADOW E&M-EST. PATIENT-LVL V: ICD-10-PCS | Mod: PBBFAC,,, | Performed by: FAMILY MEDICINE

## 2022-07-08 PROCEDURE — 3066F NEPHROPATHY DOC TX: CPT | Mod: CPTII,S$GLB,, | Performed by: FAMILY MEDICINE

## 2022-07-08 PROCEDURE — 1160F PR REVIEW ALL MEDS BY PRESCRIBER/CLIN PHARMACIST DOCUMENTED: ICD-10-PCS | Mod: CPTII,S$GLB,, | Performed by: FAMILY MEDICINE

## 2022-07-08 PROCEDURE — 1101F PR PT FALLS ASSESS DOC 0-1 FALLS W/OUT INJ PAST YR: ICD-10-PCS | Mod: CPTII,S$GLB,, | Performed by: FAMILY MEDICINE

## 2022-07-08 PROCEDURE — 4010F ACE/ARB THERAPY RXD/TAKEN: CPT | Mod: CPTII,S$GLB,, | Performed by: FAMILY MEDICINE

## 2022-07-08 PROCEDURE — 3075F PR MOST RECENT SYSTOLIC BLOOD PRESS GE 130-139MM HG: ICD-10-PCS | Mod: CPTII,S$GLB,, | Performed by: FAMILY MEDICINE

## 2022-07-08 PROCEDURE — 1159F PR MEDICATION LIST DOCUMENTED IN MEDICAL RECORD: ICD-10-PCS | Mod: CPTII,S$GLB,, | Performed by: FAMILY MEDICINE

## 2022-07-08 PROCEDURE — 3288F PR FALLS RISK ASSESSMENT DOCUMENTED: ICD-10-PCS | Mod: CPTII,S$GLB,, | Performed by: FAMILY MEDICINE

## 2022-07-08 PROCEDURE — 1159F MED LIST DOCD IN RCRD: CPT | Mod: CPTII,S$GLB,, | Performed by: FAMILY MEDICINE

## 2022-07-08 PROCEDURE — 3008F PR BODY MASS INDEX (BMI) DOCUMENTED: ICD-10-PCS | Mod: CPTII,S$GLB,, | Performed by: FAMILY MEDICINE

## 2022-07-08 PROCEDURE — 3052F HG A1C>EQUAL 8.0%<EQUAL 9.0%: CPT | Mod: CPTII,S$GLB,, | Performed by: FAMILY MEDICINE

## 2022-07-08 RX ORDER — INSULIN DETEMIR 100 [IU]/ML
50 INJECTION, SOLUTION SUBCUTANEOUS 2 TIMES DAILY
Qty: 90 ML | Refills: 1 | Status: SHIPPED | OUTPATIENT
Start: 2022-07-08 | End: 2022-08-15 | Stop reason: ALTCHOICE

## 2022-07-08 NOTE — PROGRESS NOTES
EST PATIENT VISIT FAMILY MEDICINE    CC:   Chief Complaint   Patient presents with    Diabetes    Hypertension       HPI: Brando Melgoza  is a 67 y.o. male:    Patient is known to me. He presents for routine follow up.     Type 2 diabetes  Has been checking his sugars--notes elevated numbers in the morning has been low 200s. Taking levmir 50 Bid and novolog 20-25    ROS: Review of Systems   Constitutional: Negative for fever.   Respiratory: Negative for shortness of breath.    Cardiovascular: Negative for chest pain.       PMHX:   Past Medical History:   Diagnosis Date    Cancer     colon    Cataract     Chronic kidney disease     Diabetes mellitus     Diabetes mellitus, type 2     Hyperlipidemia     Hypertension        PSHX:   Past Surgical History:   Procedure Laterality Date    AMPUTATION Right     below the knee    CARDIAC CATHETERIZATION      CATARACT EXTRACTION      COLON SURGERY      COLONOSCOPY      COLONOSCOPY N/A 5/22/2018    Procedure: COLONOSCOPY;  Surgeon: Hallie Higuera MD;  [facility];  Service: Endoscopy;  Laterality: N/A;    COLONOSCOPY N/A 6/12/2018    Procedure: COLONOSCOPY;  Surgeon: Hallie Higuera MD;  [facility];  Service: Endoscopy;  Laterality: N/A;    COLONOSCOPY N/A 6/7/2022    Procedure: COLONOSCOPY;  Surgeon: Hallie Higuera MD;  [facility];  Service: Endoscopy;  Laterality: N/A;    SKIN BIOPSY         FAMHX:   Family History   Problem Relation Age of Onset    Diabetes Mother     Hypertension Father     Diabetes Sister     Diabetes Brother     Hypertension Brother     No Known Problems Paternal Grandmother     No Known Problems Paternal Grandfather     No Known Problems Maternal Grandmother     No Known Problems Maternal Grandfather        SOCHX:   Social History     Socioeconomic History    Marital status:     Number of children: 1    Years of education: 12   Tobacco Use    Smoking status: Never  "Smoker    Smokeless tobacco: Never Used   Substance and Sexual Activity    Alcohol use: Yes     Comment: occ    Drug use: No    Sexual activity: Yes     Partners: Female       ALLERGIES: Review of patient's allergies indicates:  No Known Allergies    MEDS:   Current Outpatient Medications:     albuterol (PROVENTIL/VENTOLIN HFA) 90 mcg/actuation inhaler, Inhale 1-2 puffs into the lungs every 6 (six) hours as needed for Wheezing. Rescue, Disp: 1 Inhaler, Rfl: 0    alfuzosin (UROXATRAL) 10 mg Tb24, Take 1 tablet (10 mg total) by mouth daily with breakfast., Disp: 90 tablet, Rfl: 3    amLODIPine (NORVASC) 10 MG tablet, Take 1 tablet (10 mg total) by mouth once daily., Disp: 90 tablet, Rfl: 3    aspirin (ECOTRIN) 81 MG EC tablet, Take 81 mg by mouth once daily., Disp: , Rfl:     atorvastatin (LIPITOR) 80 MG tablet, Take 1 tablet (80 mg total) by mouth every evening., Disp: 90 tablet, Rfl: 3    BD JERRY 2ND GEN PEN NEEDLE 32 gauge x 5/32" Ndle, USE AS DIRECTED THREE TIMES DAILY, Disp: 100 each, Rfl: 0    betamethasone valerate 0.1% (VALISONE) 0.1 % Crea, Apply topically once daily., Disp: 45 g, Rfl: 2    blood sugar diagnostic Strp, Test blood sugar 3 times daily, Disp: 300 strip, Rfl: 3    carvediloL (COREG) 25 MG tablet, Take 1 tablet (25 mg total) by mouth 2 (two) times daily with meals., Disp: 180 tablet, Rfl: 3    clotrimazole (LOTRIMIN) 1 % cream, Apply 1 application topically 2 (two) times daily., Disp: , Rfl:     finasteride (PROSCAR) 5 mg tablet, Take 1 tablet (5 mg total) by mouth once daily., Disp: 90 tablet, Rfl: 3    fluocinolone (SYNALAR) 0.01 % external solution, APPLY  SOLUTION TOPICALLY TO SCALP ONCE DAILY IN THE EVENING, Disp: 60 mL, Rfl: 1    insulin aspart U-100 (NOVOLOG FLEXPEN U-100 INSULIN) 100 unit/mL (3 mL) InPn pen, Inject 20 Units into the skin 3 (three) times daily with meals., Disp: 54 mL, Rfl: 1    lancets Misc, 1 lancet by Misc.(Non-Drug; Combo Route) route 3 (three) times " daily before meals., Disp: 300 each, Rfl: 3    linaCLOtide (LINZESS) 145 mcg Cap capsule, Take 1 capsule (145 mcg total) by mouth once daily., Disp: 30 capsule, Rfl: 3    polyethylene glycol (GLYCOLAX) 17 gram/dose powder, Take 17 g by mouth once daily., Disp: 1700 g, Rfl: 11    POLYETHYLENE GLYCOL 3350 (MIRALAX ORAL), Take by mouth., Disp: , Rfl:     SHINGRIX, PF, 50 mcg/0.5 mL injection, , Disp: , Rfl:     spironolactone (ALDACTONE) 25 MG tablet, Take 1 tablet (25 mg total) by mouth once daily., Disp: 90 tablet, Rfl: 3    triamcinolone acetonide 0.1% (KENALOG) 0.1 % cream, Mix in a 50:50 portion with your Ketoconazole cream and apply nightly to the affected areas., Disp: 45 g, Rfl: 1    TRILIPIX 135 mg CpDR, TAKE 1 CAPSULE BY MOUTH EVERY DAY, Disp: 30 capsule, Rfl: 6    TRUE METRIX GLUCOSE METER kit, USE  AS  INSTRUCTED, Disp: 1 each, Rfl: 0    TRUEPLUS LANCETS 33 gauge Misc, Apply topically 3 (three) times daily., Disp: , Rfl:     vardenafiL (LEVITRA) 20 MG tablet, Take 1 tablet (20 mg total) by mouth daily as needed for Erectile Dysfunction., Disp: 15 tablet, Rfl: 7    folic acid (FOLVITE) 1 MG tablet, Take 1 tablet (1 mg total) by mouth once daily., Disp: 90 tablet, Rfl: 3    furosemide (LASIX) 40 MG tablet, TAKE ONE TABLET BY MOUTH ONCE DAILY (UP  TO  4  TABLETS  A  DAY  EXTRA,  ONLY  AS  NEEDED  FOR  SWELLING), Disp: 180 tablet, Rfl: 11    glucose 4 GM chewable tablet, Take 4 tablets (16 g total) by mouth as needed for Low blood sugar., Disp: 30 tablet, Rfl: 12    HYDROcodone-acetaminophen (NORCO) 5-325 mg per tablet, Take 1 tablet by mouth every 6 (six) hours as needed for Pain. (Patient not taking: No sig reported), Disp: 28 tablet, Rfl: 0    insulin detemir U-100 (LEVEMIR FLEXTOUCH U-100 INSULN) 100 unit/mL (3 mL) InPn pen, Inject 50 Units into the skin 2 (two) times daily., Disp: 90 mL, Rfl: 1    ketoconazole (NIZORAL) 2 % cream, APPLY A THIN LAYER OF  CREAM TOPICALLY TO AFFECTED AREA  "(FACE) TWICE DAILY (Patient not taking: No sig reported), Disp: 120 g, Rfl: 3    ketoconazole (NIZORAL) 2 % shampoo, USE SHAMPOO TOPICALLY ON SCALP TWICE A WEEK (Patient not taking: No sig reported), Disp: 120 mL, Rfl: 5    lisinopriL (PRINIVIL,ZESTRIL) 40 MG tablet, Take 1 tablet (40 mg total) by mouth once daily., Disp: 90 tablet, Rfl: 3    methotrexate 2.5 MG Tab, Take 4 tablets (10 mg total) by mouth every 7 days., Disp: 16 tablet, Rfl: 3    OBJECTIVE:   Vitals:    07/08/22 0813 07/08/22 0842   BP: (!) 144/70 130/62   BP Location:  Left arm   Patient Position:  Sitting   BP Method:  Large (Manual)   Pulse: 84    SpO2: 98%    Weight: 97.5 kg (214 lb 14.4 oz)    Height: 5' 7" (1.702 m)      Body mass index is 33.66 kg/m².      Physical Exam  Vitals and nursing note reviewed.   Constitutional:       Appearance: Normal appearance.   HENT:      Head: Normocephalic and atraumatic.   Eyes:      General: No scleral icterus.     Extraocular Movements: Extraocular movements intact.   Pulmonary:      Effort: Pulmonary effort is normal.   Neurological:      Mental Status: He is alert.           LABS:   A1C:  Recent Labs   Lab 07/08/22  0900   Hemoglobin A1C 8.1 H     CBC:  Recent Labs   Lab 01/05/22 0919   WBC 7.93   RBC 4.72   Hemoglobin 14.6   Hematocrit 43.7   Platelets 238   MCV 93   MCH 30.9   MCHC 33.4     CMP:  Recent Labs   Lab 01/05/22  0919   Glucose 85   Calcium 9.8   Albumin 4.4   Total Protein 7.6   Sodium 146 H   Potassium 5.0   CO2 26   Chloride 106   BUN 19   Creatinine 1.16   Alkaline Phosphatase 84   ALT 17   AST 23   Total Bilirubin 0.7     LIPIDS:  Recent Labs   Lab 01/05/22  0919   HDL 23 L   Cholesterol 94 L   Triglycerides 67   LDL Cholesterol 57.6 L   HDL/Cholesterol Ratio 24.5   Non-HDL Cholesterol 71   Total Cholesterol/HDL Ratio 4.1     TSH:          ASSESSMENT & PLAN:    Problem List Items Addressed This Visit        Cardiac/Vascular    Essential hypertension - Primary (Chronic)    Overview "     Well controlled. Continue current regimen             HLD (hyperlipidemia) (Chronic)    Overview     Continue statin           PAD (peripheral artery disease) (Chronic)    Overview     Continue statin, ASA              Endocrine    DM (diabetes mellitus) (Chronic)    Overview     Labs today. Continue current regimen           Relevant Medications    insulin detemir U-100 (LEVEMIR FLEXTOUCH U-100 INSULN) 100 unit/mL (3 mL) InPn pen    Other Relevant Orders    Ambulatory referral/consult to Optometry    Hemoglobin A1C (Completed)    Microalbumin/Creatinine Ratio, Urine (Completed)            Follow up in about 6 months (around 1/8/2023) for diabetes.      RTC/ED precautions discussed where applicable.   Encouraged patient to let me know if there are any further questions/concerns.     Advise patient/caretaker to check with insurance regarding orders to avoid unexpected fees/costs.     The patient/caretaker indicates understanding of these issues and agrees with the plan.    Dr. Preethi Willard MD  Family Medicine

## 2022-07-08 NOTE — PATIENT INSTRUCTIONS
[facility]  [facility]  [address] [address]    [phone]     Call and set up your diabetes eye check

## 2022-07-11 ENCOUNTER — TELEPHONE (OUTPATIENT)
Dept: FAMILY MEDICINE | Facility: CLINIC | Age: 67
End: 2022-07-11
Payer: MEDICARE

## 2022-07-11 NOTE — TELEPHONE ENCOUNTER
----- Message from Preethi Willard MD sent at 7/10/2022 12:20 PM CDT -----  Please call patient and let him know his sugars have increased. I'd like him to follow up with me or Coni within the next month, please schedule.     Coni, in case you end up seeing him, A1c is only slightly elevated from before at 8.1%, he will probably just need a minor adjustment to his insuln regimen

## 2022-07-14 LAB
LEFT EYE DM RETINOPATHY: POSITIVE
RIGHT EYE DM RETINOPATHY: POSITIVE

## 2022-07-19 ENCOUNTER — PATIENT OUTREACH (OUTPATIENT)
Dept: ADMINISTRATIVE | Facility: HOSPITAL | Age: 67
End: 2022-07-19
Payer: MEDICARE

## 2022-07-19 NOTE — PROGRESS NOTES
Non-compliant GAP report chart review - Chart review completed for the following HM test if overdue  (Mammogram, Colonoscopy, Cervical Cancer Screening,  Diabetic lab testing, and/or Dilated EYE EXAM)  07/19/2022    Care Everywhere and Media reports - updates requested and reviewed.          Health Maintenance Due   Topic Date Due    Foot Exam  05/14/2020    Eye Exam  12/12/2020    COVID-19 Vaccine (4 - Booster for Moderna series) 04/20/2022

## 2022-08-15 ENCOUNTER — TELEPHONE (OUTPATIENT)
Dept: FAMILY MEDICINE | Facility: CLINIC | Age: 67
End: 2022-08-15
Payer: MEDICARE

## 2022-08-15 DIAGNOSIS — Z79.4 TYPE 2 DIABETES MELLITUS WITH PROLIFERATIVE RETINOPATHY OF BOTH EYES, WITH LONG-TERM CURRENT USE OF INSULIN, MACULAR EDEMA PRESENCE UNSPECIFIED, UNSPECIFIED PROLIFERATIVE RETINOPATHY TYPE: ICD-10-CM

## 2022-08-15 DIAGNOSIS — E11.3593 TYPE 2 DIABETES MELLITUS WITH PROLIFERATIVE RETINOPATHY OF BOTH EYES, WITH LONG-TERM CURRENT USE OF INSULIN, MACULAR EDEMA PRESENCE UNSPECIFIED, UNSPECIFIED PROLIFERATIVE RETINOPATHY TYPE: ICD-10-CM

## 2022-08-15 RX ORDER — INSULIN GLARGINE 100 [IU]/ML
50 INJECTION, SOLUTION SUBCUTANEOUS 2 TIMES DAILY
Qty: 90 ML | Refills: 0 | Status: SHIPPED | OUTPATIENT
Start: 2022-08-15 | End: 2022-11-14

## 2022-08-15 RX ORDER — INSULIN ASPART 100 [IU]/ML
20 INJECTION, SOLUTION INTRAVENOUS; SUBCUTANEOUS
Qty: 54 ML | Refills: 0 | Status: SHIPPED | OUTPATIENT
Start: 2022-08-15 | End: 2022-08-24 | Stop reason: ALTCHOICE

## 2022-08-15 NOTE — TELEPHONE ENCOUNTER
No new care gaps identified.  [facility] Embedded Care Gaps. Reference number: [id number]. 8/15/2022   2:21:31 PM CDT

## 2022-08-15 NOTE — TELEPHONE ENCOUNTER
Yes it is the same as he did before. The Lantus is just substituting his Levemir. They are the same medication so     Lantus 50 units twice a day and  Novolog 20-25 units before meals per Dr. Willard's last note    Dr. Yanira Gonzalez D.O.   [facility]

## 2022-08-15 NOTE — TELEPHONE ENCOUNTER
Please call Brando Melgoza. Levemir changed to Lantus due to insurance preference. Dose is the same    Orders Placed This Encounter    insulin (LANTUS SOLOSTAR U-100 INSULIN) glargine 100 units/mL SubQ pen     Dr. Yanira Gonzalez D.O.   [facility]

## 2022-08-15 NOTE — TELEPHONE ENCOUNTER
Pa was denied they wants him to use humalog/lantus solostar/admelog solostar/novolog mix 70/30 flexpen

## 2022-08-16 ENCOUNTER — TELEPHONE (OUTPATIENT)
Dept: FAMILY MEDICINE | Facility: CLINIC | Age: 67
End: 2022-08-16
Payer: MEDICARE

## 2022-08-16 PROBLEM — R80.9 PROTEINURIA: Status: ACTIVE | Noted: 2022-08-16

## 2022-08-16 PROBLEM — I50.30 HEART FAILURE WITH PRESERVED EJECTION FRACTION, NYHA CLASS II: Status: ACTIVE | Noted: 2022-08-16

## 2022-08-16 PROBLEM — R06.09 DYSPNEA ON EXERTION: Status: ACTIVE | Noted: 2022-08-16

## 2022-08-16 PROBLEM — I42.9 CARDIOMYOPATHY: Status: ACTIVE | Noted: 2022-08-16

## 2022-08-16 NOTE — TELEPHONE ENCOUNTER
PA insurance is not paying for the novolog flexpen its asking for it to be changed to the humalog/humalog kwikpen/lyumjev kwik pen

## 2022-08-19 ENCOUNTER — TELEPHONE (OUTPATIENT)
Dept: FAMILY MEDICINE | Facility: CLINIC | Age: 67
End: 2022-08-19
Payer: MEDICARE

## 2022-08-19 ENCOUNTER — OFFICE VISIT (OUTPATIENT)
Dept: FAMILY MEDICINE | Facility: CLINIC | Age: 67
End: 2022-08-19
Payer: MEDICARE

## 2022-08-19 VITALS
HEIGHT: 67 IN | HEART RATE: 56 BPM | WEIGHT: 219.13 LBS | DIASTOLIC BLOOD PRESSURE: 74 MMHG | BODY MASS INDEX: 34.39 KG/M2 | SYSTOLIC BLOOD PRESSURE: 132 MMHG | OXYGEN SATURATION: 98 %

## 2022-08-19 DIAGNOSIS — E66.09 CLASS 1 OBESITY DUE TO EXCESS CALORIES WITH SERIOUS COMORBIDITY AND BODY MASS INDEX (BMI) OF 34.0 TO 34.9 IN ADULT: Chronic | ICD-10-CM

## 2022-08-19 DIAGNOSIS — E11.3593 TYPE 2 DIABETES MELLITUS WITH PROLIFERATIVE RETINOPATHY OF BOTH EYES, WITH LONG-TERM CURRENT USE OF INSULIN, MACULAR EDEMA PRESENCE UNSPECIFIED, UNSPECIFIED PROLIFERATIVE RETINOPATHY TYPE: Primary | Chronic | ICD-10-CM

## 2022-08-19 DIAGNOSIS — Z79.4 TYPE 2 DIABETES MELLITUS WITH PROLIFERATIVE RETINOPATHY OF BOTH EYES, WITH LONG-TERM CURRENT USE OF INSULIN, MACULAR EDEMA PRESENCE UNSPECIFIED, UNSPECIFIED PROLIFERATIVE RETINOPATHY TYPE: Primary | Chronic | ICD-10-CM

## 2022-08-19 DIAGNOSIS — E78.2 MIXED HYPERLIPIDEMIA: Chronic | ICD-10-CM

## 2022-08-19 DIAGNOSIS — I10 ESSENTIAL HYPERTENSION: Chronic | ICD-10-CM

## 2022-08-19 PROCEDURE — 3060F PR POS MICROALBUMINURIA RESULT DOCUMENTED/REVIEW: ICD-10-PCS | Mod: CPTII,S$GLB,,

## 2022-08-19 PROCEDURE — 3288F PR FALLS RISK ASSESSMENT DOCUMENTED: ICD-10-PCS | Mod: CPTII,S$GLB,,

## 2022-08-19 PROCEDURE — 99214 OFFICE O/P EST MOD 30 MIN: CPT | Mod: S$GLB,,,

## 2022-08-19 PROCEDURE — 99999 PR PBB SHADOW E&M-EST. PATIENT-LVL V: ICD-10-PCS | Mod: PBBFAC,,,

## 2022-08-19 PROCEDURE — 4010F ACE/ARB THERAPY RXD/TAKEN: CPT | Mod: CPTII,S$GLB,,

## 2022-08-19 PROCEDURE — 3078F DIAST BP <80 MM HG: CPT | Mod: CPTII,S$GLB,,

## 2022-08-19 PROCEDURE — 1101F PR PT FALLS ASSESS DOC 0-1 FALLS W/OUT INJ PAST YR: ICD-10-PCS | Mod: CPTII,S$GLB,,

## 2022-08-19 PROCEDURE — 1159F PR MEDICATION LIST DOCUMENTED IN MEDICAL RECORD: ICD-10-PCS | Mod: CPTII,S$GLB,,

## 2022-08-19 PROCEDURE — 1160F PR REVIEW ALL MEDS BY PRESCRIBER/CLIN PHARMACIST DOCUMENTED: ICD-10-PCS | Mod: CPTII,S$GLB,,

## 2022-08-19 PROCEDURE — 3078F PR MOST RECENT DIASTOLIC BLOOD PRESSURE < 80 MM HG: ICD-10-PCS | Mod: CPTII,S$GLB,,

## 2022-08-19 PROCEDURE — 3008F BODY MASS INDEX DOCD: CPT | Mod: CPTII,S$GLB,,

## 2022-08-19 PROCEDURE — 3052F PR MOST RECENT HEMOGLOBIN A1C LEVEL 8.0 - < 9.0%: ICD-10-PCS | Mod: CPTII,S$GLB,,

## 2022-08-19 PROCEDURE — 99999 PR PBB SHADOW E&M-EST. PATIENT-LVL V: CPT | Mod: PBBFAC,,,

## 2022-08-19 PROCEDURE — 1126F PR PAIN SEVERITY QUANTIFIED, NO PAIN PRESENT: ICD-10-PCS | Mod: CPTII,S$GLB,,

## 2022-08-19 PROCEDURE — 3075F PR MOST RECENT SYSTOLIC BLOOD PRESS GE 130-139MM HG: ICD-10-PCS | Mod: CPTII,S$GLB,,

## 2022-08-19 PROCEDURE — 3060F POS MICROALBUMINURIA REV: CPT | Mod: CPTII,S$GLB,,

## 2022-08-19 PROCEDURE — 1126F AMNT PAIN NOTED NONE PRSNT: CPT | Mod: CPTII,S$GLB,,

## 2022-08-19 PROCEDURE — 1160F RVW MEDS BY RX/DR IN RCRD: CPT | Mod: CPTII,S$GLB,,

## 2022-08-19 PROCEDURE — 3066F NEPHROPATHY DOC TX: CPT | Mod: CPTII,S$GLB,,

## 2022-08-19 PROCEDURE — 4010F PR ACE/ARB THEARPY RXD/TAKEN: ICD-10-PCS | Mod: CPTII,S$GLB,,

## 2022-08-19 PROCEDURE — 1159F MED LIST DOCD IN RCRD: CPT | Mod: CPTII,S$GLB,,

## 2022-08-19 PROCEDURE — 3075F SYST BP GE 130 - 139MM HG: CPT | Mod: CPTII,S$GLB,,

## 2022-08-19 PROCEDURE — 3066F PR DOCUMENTATION OF TREATMENT FOR NEPHROPATHY: ICD-10-PCS | Mod: CPTII,S$GLB,,

## 2022-08-19 PROCEDURE — 99214 PR OFFICE/OUTPT VISIT, EST, LEVL IV, 30-39 MIN: ICD-10-PCS | Mod: S$GLB,,,

## 2022-08-19 PROCEDURE — 3052F HG A1C>EQUAL 8.0%<EQUAL 9.0%: CPT | Mod: CPTII,S$GLB,,

## 2022-08-19 PROCEDURE — 3008F PR BODY MASS INDEX (BMI) DOCUMENTED: ICD-10-PCS | Mod: CPTII,S$GLB,,

## 2022-08-19 PROCEDURE — 1101F PT FALLS ASSESS-DOCD LE1/YR: CPT | Mod: CPTII,S$GLB,,

## 2022-08-19 PROCEDURE — 3288F FALL RISK ASSESSMENT DOCD: CPT | Mod: CPTII,S$GLB,,

## 2022-08-19 NOTE — PROGRESS NOTES
Subjective:       Patient ID: Brando Melgoza is a 67 y.o. male.    Chief Complaint: Follow-up and Diabetes    HPI  Brando Melgoza is a 67 y.o. male of Dr. Willard with hypertension, hyperlipidema, PAD, cardiomyopathy, heart failure with preserved EF, BPH, CKD stage 3, type II diabetes, right BKA, unknown to me, presents today for a diabetic follow up.     Type II diabetes:  Last A1C increased from 7.8 to 8.1.  Reports checks sugars in the morning before meals and in the evening before meals. Sugar usually runs arounds 130s in the morning, evening averages 180s-220s before the evening meal, depending on what he has eaten earlier. Around 1-2 pm sugars drop to the 60s to 80s.   Reports he eats breakfast and supper, hardly eats lunch. Takes insulin in the morning when he eats; takes 50 units of Levemir in the morning and 55 Levemir at night, switched to Lantus, but taking Levemir until it is all gone. Knows s/s of hypoglycemia.     If he eats, he takes 20 units of Novolog, so usually 20 in the morning and 20 in the evening.   Reports eating ice cream and chocolate at every night for a while; also eating  things like red beans and rice, 1/2 hamburgers late at night;  Plans to stop and work on diet modification.     10/27/22 at 11 am scheduled to see Nettie Saleem NP with [facility]. Instructed to have labs done about 1 week before the appointment.     Has already had eye exam.     Hypertension:   Does not check BP at home, usually runs pretty good when he checks it at [facility]. Usually the same as what we have.         Past Medical History:   Diagnosis Date    Cancer     colon    Cataract     Chronic kidney disease     Diabetes mellitus     Diabetes mellitus, type 2     Hyperlipidemia     Hypertension        Past Surgical History:   Procedure Laterality Date    AMPUTATION Right     below the knee    CARDIAC CATHETERIZATION      CATARACT EXTRACTION      COLON SURGERY      COLONOSCOPY      COLONOSCOPY N/A 5/22/2018     Procedure: COLONOSCOPY;  Surgeon: Hallie Higuera MD;  Location: [facility];  Service: Endoscopy;  Laterality: N/A;    COLONOSCOPY N/A 6/12/2018    Procedure: COLONOSCOPY;  Surgeon: Hallie Higuera MD;  Location: [facility];  Service: Endoscopy;  Laterality: N/A;    COLONOSCOPY N/A 6/7/2022    Procedure: COLONOSCOPY;  Surgeon: Hallie Higuera MD;  Location: [facility];  Service: Endoscopy;  Laterality: N/A;    SKIN BIOPSY         Family History   Problem Relation Age of Onset    Diabetes Mother     Hypertension Father     Diabetes Sister     Diabetes Brother     Hypertension Brother     No Known Problems Paternal Grandmother     No Known Problems Paternal Grandfather     No Known Problems Maternal Grandmother     No Known Problems Maternal Grandfather        Social History     Socioeconomic History    Marital status:     Number of children: 1    Years of education: 12   Tobacco Use    Smoking status: Never Smoker    Smokeless tobacco: Never Used   Substance and Sexual Activity    Alcohol use: Yes     Comment: occ    Drug use: No    Sexual activity: Yes     Partners: Female       Review of Systems   Constitutional: Negative for appetite change and fatigue.   HENT: Negative.  Negative for congestion, hearing loss, postnasal drip, rhinorrhea, sinus pressure, sinus pain, sneezing and sore throat.    Eyes: Negative.  Negative for pain and visual disturbance.   Respiratory: Negative.  Negative for cough and shortness of breath.    Cardiovascular: Negative.  Negative for chest pain and leg swelling.   Gastrointestinal: Positive for constipation. Negative for abdominal pain, diarrhea, nausea and vomiting.   Endocrine: Negative.    Genitourinary: Negative.  Negative for decreased urine volume, difficulty urinating, dysuria, frequency, hematuria and urgency.   Musculoskeletal: Negative for arthralgias, joint swelling and myalgias.   Skin: Negative.  Negative for color change.  "  Neurological: Negative for dizziness, syncope, weakness, light-headedness, numbness and headaches.   Psychiatric/Behavioral: Negative.          Objective:     Vitals:    08/19/22 0800   BP: 132/74   Pulse: (!) 56   SpO2: 98%   Weight: 99.4 kg (219 lb 1.6 oz)   Height: 5' 7" (1.702 m)          Physical Exam  Vitals reviewed.   Constitutional:       Appearance: Normal appearance. He is well-developed.   HENT:      Head: Normocephalic and atraumatic.      Nose: Nose normal.   Cardiovascular:      Rate and Rhythm: Regular rhythm. Bradycardia present.      Heart sounds: Normal heart sounds.   Pulmonary:      Effort: Pulmonary effort is normal.      Breath sounds: Normal breath sounds.   Musculoskeletal:      Comments: Right BKA with prosthetic, ambulates with cane.    Skin:     General: Skin is warm and dry.   Neurological:      General: No focal deficit present.      Mental Status: He is alert and oriented to person, place, and time.   Psychiatric:         Mood and Affect: Mood normal.         Speech: Speech normal.         Behavior: Behavior is cooperative.           Laboratory:  CBC:  No results for input(s): WBC, RBC, HGB, HCT, PLT, MCV, MCH, MCHC in the last 2160 hours.  CMP:  Recent Labs   Lab Result Units 08/16/22  1633   Glucose mg/dL 144*   Calcium mg/dL 9.5   Albumin g/dL 4.0   Total Protein g/dL 7.3   Sodium mmol/L 140   Potassium mmol/L 4.4   CO2 mmol/L 21*   Chloride mmol/L 109   BUN mg/dL 29*   Alkaline Phosphatase U/L 89   ALT U/L 14   AST U/L 15   Total Bilirubin mg/dL 0.5     URINALYSIS:  No results for input(s): COLORU, CLARITYU, SPECGRAV, PHUR, PROTEINUA, GLUCOSEU, BILIRUBINCON, BLOODU, WBCU, RBCU, BACTERIA, MUCUS, NITRITE, LEUKOCYTESUR, UROBILINOGEN, HYALINECASTS in the last 2160 hours.   LIPIDS:  No results for input(s): TSH, HDL, CHOL, TRIG, LDLCALC, CHOLHDL, NONHDLCHOL, TOTALCHOLEST in the last 2160 hours.  TSH:  No results for input(s): TSH in the last 2160 hours.  A1C:  Recent Labs   Lab Result " Units 07/08/22  0900   Hemoglobin A1C % 8.1*       Assessment:         ICD-10-CM ICD-9-CM   1. Type 2 diabetes mellitus with proliferative retinopathy of both eyes, with long-term current use of insulin, macular edema presence unspecified, unspecified proliferative retinopathy type  E11.3593 250.50    Z79.4 362.02     V58.67   2. Essential hypertension  I10 401.9   3. Mixed hyperlipidemia  E78.2 272.2   4. Class 1 obesity due to excess calories with serious comorbidity and body mass index (BMI) of 34.0 to 34.9 in adult  E66.09 278.00    Z68.34 V85.34       Plan:       Type 2 diabetes mellitus with proliferative retinopathy of both eyes, with long-term current use of insulin, macular edema presence unspecified, unspecified proliferative retinopathy type    -Currently taking Levemir 50 units in morning and 55 units at night. Increase am Levemir to 55  Units twice per day. Continue to monitor glucose twice per day and keep a log.  -Discussed and instructed to monitor for s/s of hypoglycemia, instructed to get some glucose tabs or have something readily available if needed.   -Increase water intake  -Return to clinic in 2 weeks with glucose log and glucometer.  -keep appointment with Endo in October, labs are scheduled for 10/24, have them done prior to appt  -work on diet modification, avoid late night eating/snacking on ice cream, replace with fruit.     Essential hypertension  Chronic; stable on medication. Continue medication as prescribed.    Mixed hyperlipidemia  Chronic; stable on medication. Continue medication as prescribed.    Class 1 obesity due to excess calories with serious comorbidity and body mass index (BMI) of 34.0 to 34.9 in adult  Work on diet modification and increase in activity as tolerated.     Patient verbalizes understanding and agrees with current treatment plan.    Follow up in about 2 weeks (around 9/2/2022).     Patient's Medications   New Prescriptions    No medications on file   Previous  "Medications    ALBUTEROL (PROVENTIL/VENTOLIN HFA) 90 MCG/ACTUATION INHALER    Inhale 1-2 puffs into the lungs every 6 (six) hours as needed for Wheezing. Rescue    ALFUZOSIN (UROXATRAL) 10 MG TB24    Take 1 tablet (10 mg total) by mouth daily with breakfast.    AMLODIPINE (NORVASC) 10 MG TABLET    Take 1 tablet (10 mg total) by mouth once daily.    ASPIRIN (ECOTRIN) 81 MG EC TABLET    Take 81 mg by mouth once daily.    ATORVASTATIN (LIPITOR) 80 MG TABLET    Take 1 tablet (80 mg total) by mouth every evening.    BD JERRY 2ND GEN PEN NEEDLE 32 GAUGE X 5/32" NDLE    USE AS DIRECTED THREE TIMES DAILY    BETAMETHASONE VALERATE 0.1% (VALISONE) 0.1 % CREA    Apply topically once daily.    BLOOD SUGAR DIAGNOSTIC STRP    Test blood sugar 3 times daily    CARVEDILOL (COREG) 25 MG TABLET    Take 1 tablet (25 mg total) by mouth 2 (two) times daily with meals.    CLOTRIMAZOLE (LOTRIMIN) 1 % CREAM    Apply 1 application topically 2 (two) times daily.    FINASTERIDE (PROSCAR) 5 MG TABLET    Take 1 tablet (5 mg total) by mouth once daily.    FLUOCINOLONE (SYNALAR) 0.01 % EXTERNAL SOLUTION    APPLY  SOLUTION TOPICALLY TO SCALP ONCE DAILY IN THE EVENING    FOLIC ACID (FOLVITE) 1 MG TABLET    Take 1 tablet (1 mg total) by mouth once daily.    FUROSEMIDE (LASIX) 40 MG TABLET    TAKE ONE TABLET BY MOUTH ONCE DAILY (UP  TO  4  TABLETS  A  DAY  EXTRA,  ONLY  AS  NEEDED  FOR  SWELLING)    GLUCOSE 4 GM CHEWABLE TABLET    Take 4 tablets (16 g total) by mouth as needed for Low blood sugar.    HYDROCODONE-ACETAMINOPHEN (NORCO) 5-325 MG PER TABLET    Take 1 tablet by mouth every 6 (six) hours as needed for Pain.    INSULIN (LANTUS SOLOSTAR U-100 INSULIN) GLARGINE 100 UNITS/ML SUBQ PEN    Inject 50 Units into the skin 2 (two) times a day.    INSULIN ASPART U-100 (NOVOLOG FLEXPEN U-100 INSULIN) 100 UNIT/ML (3 ML) INPN PEN    Inject 20 Units into the skin 3 (three) times daily with meals.    KETOCONAZOLE (NIZORAL) 2 % CREAM    APPLY A THIN LAYER OF "  CREAM TOPICALLY TO AFFECTED AREA (FACE) TWICE DAILY    KETOCONAZOLE (NIZORAL) 2 % SHAMPOO    USE SHAMPOO TOPICALLY ON SCALP TWICE A WEEK    LANCETS MISC    1 lancet by Misc.(Non-Drug; Combo Route) route 3 (three) times daily before meals.    LINACLOTIDE (LINZESS) 145 MCG CAP CAPSULE    Take 1 capsule (145 mcg total) by mouth once daily.    LISINOPRIL (PRINIVIL,ZESTRIL) 40 MG TABLET    Take 1 tablet (40 mg total) by mouth once daily.    METHOTREXATE 2.5 MG TAB    Take 4 tablets (10 mg total) by mouth every 7 days.    POLYETHYLENE GLYCOL (GLYCOLAX) 17 GRAM/DOSE POWDER    Take 17 g by mouth once daily.    POLYETHYLENE GLYCOL 3350 (MIRALAX ORAL)    Take by mouth.    SHINGRIX, PF, 50 MCG/0.5 ML INJECTION        SPIRONOLACTONE (ALDACTONE) 25 MG TABLET    Take 1 tablet (25 mg total) by mouth once daily.    TRIAMCINOLONE ACETONIDE 0.1% (KENALOG) 0.1 % CREAM    Mix in a 50:50 portion with your Ketoconazole cream and apply nightly to the affected areas.    TRILIPIX 135 MG CPDR    TAKE 1 CAPSULE BY MOUTH EVERY DAY    TRUE METRIX GLUCOSE METER KIT    USE  AS  INSTRUCTED    TRUEPLUS LANCETS 33 GAUGE MISC    Apply topically 3 (three) times daily.    VARDENAFIL (LEVITRA) 20 MG TABLET    Take 1 tablet (20 mg total) by mouth daily as needed for Erectile Dysfunction.   Modified Medications    No medications on file   Discontinued Medications    No medications on file       Coni Brock NP

## 2022-08-19 NOTE — PATIENT INSTRUCTIONS
Return to clinic in 2 weeks with BP log and glucometer.   Increase morning Levemir to 55 units. Continue 55 units at night.   Continue to monitor glucose twice per day.  Work on diet modification to include low carbs  and sugars.  Only take mealtime insulin if eating.   Increase water intake at least 4-16 oz bottles of water per day or more.

## 2022-08-23 NOTE — TELEPHONE ENCOUNTER
Call patient to confirm if he received Novolog. Patient states he has about 100 units left, insurance changed his levemir to lantus but unsure about the novolog. Advised will check with my nurse and call him if needed.     Patient verbalizes understanding and agrees with current treatment plan.

## 2022-08-24 DIAGNOSIS — Z79.4 TYPE 2 DIABETES MELLITUS WITH PROLIFERATIVE RETINOPATHY OF BOTH EYES, WITH LONG-TERM CURRENT USE OF INSULIN, MACULAR EDEMA PRESENCE UNSPECIFIED, UNSPECIFIED PROLIFERATIVE RETINOPATHY TYPE: Primary | ICD-10-CM

## 2022-08-24 DIAGNOSIS — E11.3593 TYPE 2 DIABETES MELLITUS WITH PROLIFERATIVE RETINOPATHY OF BOTH EYES, WITH LONG-TERM CURRENT USE OF INSULIN, MACULAR EDEMA PRESENCE UNSPECIFIED, UNSPECIFIED PROLIFERATIVE RETINOPATHY TYPE: Primary | ICD-10-CM

## 2022-08-24 RX ORDER — INSULIN LISPRO 100 [IU]/ML
20 INJECTION, SOLUTION INTRAVENOUS; SUBCUTANEOUS
Qty: 18 ML | Refills: 11 | Status: SHIPPED | OUTPATIENT
Start: 2022-08-24 | End: 2024-03-18

## 2022-08-24 NOTE — TELEPHONE ENCOUNTER
S/w patient, advised Novolog has been changed to Humalog as insurance will no longer cover NOvolog. advised same amount of units with meals. Advised sent to [facility].     Patient verbalizes understanding and agrees with current treatment plan.

## 2022-09-02 ENCOUNTER — OFFICE VISIT (OUTPATIENT)
Dept: FAMILY MEDICINE | Facility: CLINIC | Age: 67
End: 2022-09-02
Payer: MEDICARE

## 2022-09-02 VITALS
WEIGHT: 222.19 LBS | HEART RATE: 53 BPM | OXYGEN SATURATION: 97 % | BODY MASS INDEX: 34.87 KG/M2 | SYSTOLIC BLOOD PRESSURE: 134 MMHG | DIASTOLIC BLOOD PRESSURE: 64 MMHG | HEIGHT: 67 IN

## 2022-09-02 DIAGNOSIS — I50.30 HEART FAILURE WITH PRESERVED EJECTION FRACTION, NYHA CLASS II: ICD-10-CM

## 2022-09-02 DIAGNOSIS — Z79.4 TYPE 2 DIABETES MELLITUS WITH PROLIFERATIVE RETINOPATHY OF BOTH EYES, WITH LONG-TERM CURRENT USE OF INSULIN, MACULAR EDEMA PRESENCE UNSPECIFIED, UNSPECIFIED PROLIFERATIVE RETINOPATHY TYPE: Chronic | ICD-10-CM

## 2022-09-02 DIAGNOSIS — I10 ESSENTIAL HYPERTENSION: Primary | Chronic | ICD-10-CM

## 2022-09-02 DIAGNOSIS — N18.31 STAGE 3A CHRONIC KIDNEY DISEASE: ICD-10-CM

## 2022-09-02 DIAGNOSIS — E11.3593 TYPE 2 DIABETES MELLITUS WITH PROLIFERATIVE RETINOPATHY OF BOTH EYES, WITH LONG-TERM CURRENT USE OF INSULIN, MACULAR EDEMA PRESENCE UNSPECIFIED, UNSPECIFIED PROLIFERATIVE RETINOPATHY TYPE: Chronic | ICD-10-CM

## 2022-09-02 PROCEDURE — 3060F PR POS MICROALBUMINURIA RESULT DOCUMENTED/REVIEW: ICD-10-PCS | Mod: CPTII,S$GLB,,

## 2022-09-02 PROCEDURE — 1101F PR PT FALLS ASSESS DOC 0-1 FALLS W/OUT INJ PAST YR: ICD-10-PCS | Mod: CPTII,S$GLB,,

## 2022-09-02 PROCEDURE — 1126F AMNT PAIN NOTED NONE PRSNT: CPT | Mod: CPTII,S$GLB,,

## 2022-09-02 PROCEDURE — 3078F PR MOST RECENT DIASTOLIC BLOOD PRESSURE < 80 MM HG: ICD-10-PCS | Mod: CPTII,S$GLB,,

## 2022-09-02 PROCEDURE — 3052F HG A1C>EQUAL 8.0%<EQUAL 9.0%: CPT | Mod: CPTII,S$GLB,,

## 2022-09-02 PROCEDURE — 99214 PR OFFICE/OUTPT VISIT, EST, LEVL IV, 30-39 MIN: ICD-10-PCS | Mod: S$GLB,,,

## 2022-09-02 PROCEDURE — 3075F PR MOST RECENT SYSTOLIC BLOOD PRESS GE 130-139MM HG: ICD-10-PCS | Mod: CPTII,S$GLB,,

## 2022-09-02 PROCEDURE — 3075F SYST BP GE 130 - 139MM HG: CPT | Mod: CPTII,S$GLB,,

## 2022-09-02 PROCEDURE — 1160F RVW MEDS BY RX/DR IN RCRD: CPT | Mod: CPTII,S$GLB,,

## 2022-09-02 PROCEDURE — 3052F PR MOST RECENT HEMOGLOBIN A1C LEVEL 8.0 - < 9.0%: ICD-10-PCS | Mod: CPTII,S$GLB,,

## 2022-09-02 PROCEDURE — 99999 PR PBB SHADOW E&M-EST. PATIENT-LVL V: ICD-10-PCS | Mod: PBBFAC,,,

## 2022-09-02 PROCEDURE — 4010F PR ACE/ARB THEARPY RXD/TAKEN: ICD-10-PCS | Mod: CPTII,S$GLB,,

## 2022-09-02 PROCEDURE — 99214 OFFICE O/P EST MOD 30 MIN: CPT | Mod: S$GLB,,,

## 2022-09-02 PROCEDURE — 99999 PR PBB SHADOW E&M-EST. PATIENT-LVL V: CPT | Mod: PBBFAC,,,

## 2022-09-02 PROCEDURE — 3078F DIAST BP <80 MM HG: CPT | Mod: CPTII,S$GLB,,

## 2022-09-02 PROCEDURE — 3060F POS MICROALBUMINURIA REV: CPT | Mod: CPTII,S$GLB,,

## 2022-09-02 PROCEDURE — 3288F FALL RISK ASSESSMENT DOCD: CPT | Mod: CPTII,S$GLB,,

## 2022-09-02 PROCEDURE — 1159F MED LIST DOCD IN RCRD: CPT | Mod: CPTII,S$GLB,,

## 2022-09-02 PROCEDURE — 4010F ACE/ARB THERAPY RXD/TAKEN: CPT | Mod: CPTII,S$GLB,,

## 2022-09-02 PROCEDURE — 3008F PR BODY MASS INDEX (BMI) DOCUMENTED: ICD-10-PCS | Mod: CPTII,S$GLB,,

## 2022-09-02 PROCEDURE — 3066F PR DOCUMENTATION OF TREATMENT FOR NEPHROPATHY: ICD-10-PCS | Mod: CPTII,S$GLB,,

## 2022-09-02 PROCEDURE — 3008F BODY MASS INDEX DOCD: CPT | Mod: CPTII,S$GLB,,

## 2022-09-02 PROCEDURE — 1126F PR PAIN SEVERITY QUANTIFIED, NO PAIN PRESENT: ICD-10-PCS | Mod: CPTII,S$GLB,,

## 2022-09-02 PROCEDURE — 3066F NEPHROPATHY DOC TX: CPT | Mod: CPTII,S$GLB,,

## 2022-09-02 PROCEDURE — 1159F PR MEDICATION LIST DOCUMENTED IN MEDICAL RECORD: ICD-10-PCS | Mod: CPTII,S$GLB,,

## 2022-09-02 PROCEDURE — 1101F PT FALLS ASSESS-DOCD LE1/YR: CPT | Mod: CPTII,S$GLB,,

## 2022-09-02 PROCEDURE — 3288F PR FALLS RISK ASSESSMENT DOCUMENTED: ICD-10-PCS | Mod: CPTII,S$GLB,,

## 2022-09-02 PROCEDURE — 1160F PR REVIEW ALL MEDS BY PRESCRIBER/CLIN PHARMACIST DOCUMENTED: ICD-10-PCS | Mod: CPTII,S$GLB,,

## 2022-09-02 RX ORDER — PEN NEEDLE, DIABETIC 31 GX5/16"
NEEDLE, DISPOSABLE MISCELLANEOUS
COMMUNITY
Start: 2022-08-18 | End: 2023-05-31

## 2022-09-02 NOTE — PATIENT INSTRUCTIONS
Continue Levemir to 55 units twice per day.   Continue to monitor glucose twice per day.  Work on diet modification to include low carbs  and sugars.  Only take mealtime insulin if eating.   Increase water intake at least 4-16 oz bottles of water per day or more.      Keep appointment with Nettie Saleem NP with Endo.

## 2022-09-02 NOTE — PROGRESS NOTES
Here is the plan from today's visit    1. Abnormal lung sounds  2. Chronic cough  Rubbing lung sounds in bilateral lower lobes, likely representing old lung scarring. This does not need further work-up at this time.No change in symptoms. Feeling well and saturating at 97% on room air. Xray without any concerning features.      Please call or return to clinic if your symptoms don't go away.    Follow up plan  Follow-up with your PCP if you develop any worsening symptoms.    Thank you for coming to [facility] today.  Lab Testing:  **If you had lab testing today and your results are reassuring or normal they will be mailed to you or sent through [employer] within 7 days.   **If the lab tests need quick action we will call you with the results.  The phone number we will call with results is # [phone] (home) . If this is not the best number please call our clinic and change the number.  Medication Refills:  If you need any refills please call your pharmacy and they will contact us.   If you need to  your refill at a new pharmacy, please contact the new pharmacy directly. The new pharmacy will help you get your medications transferred faster.   Scheduling:  If you have any concerns about today's visit or wish to schedule another appointment please call our office during normal business hours [phone] (8-5:00 M-F)  If a referral was made to a [facility] and you don't get a call from central scheduling please call [phone].  If a Mammogram was ordered for you at [facility] call [phone] to schedule or change your appointment.  If you had an XRay/CT/Ultrasound/MRI ordered the number is [phone] to schedule or change your radiology appointment.   Medical Concerns:  If you have urgent medical concerns please call [phone] at any time of the day.    Viola Delgado MD    Subjective:         Chief Complaint: Follow-up and Diabetes    HPI   Brando Melgoza is a 67 y.o. male, patient of Preethi Willard MD with hypertension, hyperlipidema, PAD, cardiomyopathy, heart failure with preserved EF, BPH, CKD stage 3, type II diabetes, right BKA, known to me, presents today for a 2 week diabetic follow up.      Type II diabetes:  Last A1C increased from 7.8 to 8.1.  Reports checks sugars in the morning and evening before meals. Sugars ranging from 90s to 200 both morning and evenings before meal,  depending on what he has eaten earlier. Had a glucose of 244, states he thinks he may have missed one of his shots. This morning glucose was 107.   Reports he eats breakfast and supper, hardly eats lunch. Takes insulin in the morning when he eats; takes 55 units of Lantus in the morning and 55 Lantus at night. Knows s/s of hypoglycemia. Reports sometimes sugars go low in the evening, may drink a coke to bring it up.      If he eats, he takes 20 units of Novolog, so usually 20 in the morning and 20 in the evening, sometimes takes 20-25 depending on the glucose levels.   Reports decrease in eating ice cream and chocolate, depending on if his grand baby is asleep when he gets home. Has been working on diet modification and decrease in portions.      10/27/22 at 11 am scheduled to see Nettie Saleem NP with [facility]. Instructed to have labs done about 1 week before the appointment.            Diabetic eye exam this year.      Hypertension:   Does not check BP at home, usually runs pretty good when he checks it at [facility]. Usually the same as what we have.     CHF:  Recent visit with Dr. Tineo cardiology, kidney numbers are a little abnormal; was advised to avoid motrin, aleve, ibuprophen, NSAIDS; try to take lasix as little as possible. Per patient, does not take Lasix, reports he runs to the bathroom all the time and it wakes him up at night.             Past Medical History:   Diagnosis Date    Cancer      colon    Cataract     Chronic kidney disease     Diabetes mellitus     Diabetes mellitus, type 2     Hyperlipidemia     Hypertension        Past Surgical History:   Procedure Laterality Date    AMPUTATION Right     below the knee    CARDIAC CATHETERIZATION      CATARACT EXTRACTION      COLON SURGERY      COLONOSCOPY      COLONOSCOPY N/A 5/22/2018    Procedure: COLONOSCOPY;  Surgeon: Hallie Higuera MD;  Location: [facility];  Service: Endoscopy;  Laterality: N/A;    COLONOSCOPY N/A 6/12/2018    Procedure: COLONOSCOPY;  Surgeon: Hallie Higuera MD;  Location: [facility] ENDO;  Service: Endoscopy;  Laterality: N/A;    COLONOSCOPY N/A 6/7/2022    Procedure: COLONOSCOPY;  Surgeon: Hallie Higuera MD;  Location: [facility] ENDO;  Service: Endoscopy;  Laterality: N/A;    SKIN BIOPSY         Family History   Problem Relation Age of Onset    Diabetes Mother     Hypertension Father     Diabetes Sister     Diabetes Brother     Hypertension Brother     No Known Problems Paternal Grandmother     No Known Problems Paternal Grandfather     No Known Problems Maternal Grandmother     No Known Problems Maternal Grandfather        Social History     Socioeconomic History    Marital status:     Number of children: 1    Years of education: 12   Tobacco Use    Smoking status: Never    Smokeless tobacco: Never   Substance and Sexual Activity    Alcohol use: Yes     Comment: occ    Drug use: No    Sexual activity: Yes     Partners: Female       Review of Systems   Constitutional:  Negative for appetite change and fatigue.   HENT:  Negative for congestion, hearing loss, postnasal drip, rhinorrhea, sinus pressure, sinus pain, sneezing and sore throat.    Eyes:  Negative for pain and visual disturbance.   Respiratory: Negative.  Negative for cough and shortness of breath.    Cardiovascular:  Positive for leg swelling. Negative for chest pain.   Gastrointestinal:  Positive for constipation. Negative for abdominal pain,  "diarrhea, nausea and vomiting.   Endocrine: Negative.    Genitourinary:  Negative for decreased urine volume, difficulty urinating, dysuria, frequency, hematuria and urgency.   Musculoskeletal:  Negative for arthralgias and myalgias.   Skin:  Negative for color change.   Neurological:  Negative for dizziness, syncope, weakness, light-headedness, numbness and headaches.   Psychiatric/Behavioral: Negative.         Objective:     Vitals:    22 0810   BP: 134/64   Pulse: (!) 53   SpO2: 97%   Weight: 100.8 kg (222 lb 3.2 oz)   Height: 5' 7" (1.702 m)          Physical Exam  Vitals reviewed.   Constitutional:       Appearance: Normal appearance.   HENT:      Head: Normocephalic and atraumatic.   Eyes:      Extraocular Movements: Extraocular movements intact.   Cardiovascular:      Rate and Rhythm: Normal rate and regular rhythm.      Heart sounds: Normal heart sounds.   Pulmonary:      Effort: Pulmonary effort is normal.      Breath sounds: Normal breath sounds.   Musculoskeletal:      Left lower le+ Edema present.      Comments: Right bka, currently wearing prosthetic.     Skin:     General: Skin is warm and dry.   Neurological:      General: No focal deficit present.      Mental Status: He is alert and oriented to person, place, and time.   Psychiatric:         Mood and Affect: Mood normal.         Speech: Speech normal.         Behavior: Behavior is cooperative.         Laboratory:  CBC:  No results for input(s): WBC, RBC, HGB, HCT, PLT, MCV, MCH, MCHC in the last 2160 hours.  CMP:  Recent Labs   Lab Result Units 22  1633   Glucose mg/dL 144*   Calcium mg/dL 9.5   Albumin g/dL 4.0   Total Protein g/dL 7.3   Sodium mmol/L 140   Potassium mmol/L 4.4   CO2 mmol/L 21*   Chloride mmol/L 109   BUN mg/dL 29*   Alkaline Phosphatase U/L 89   ALT U/L 14   AST U/L 15   Total Bilirubin mg/dL 0.5     URINALYSIS:  No results for input(s): COLORU, CLARITYU, SPECGRAV, PHUR, PROTEINUA, GLUCOSEU, BILIRUBINCON, BLOODU, " WBCU, RBCU, BACTERIA, MUCUS, NITRITE, LEUKOCYTESUR, UROBILINOGEN, HYALINECASTS in the last 2160 hours.   LIPIDS:  No results for input(s): TSH, HDL, CHOL, TRIG, LDLCALC, CHOLHDL, NONHDLCHOL, TOTALCHOLEST in the last 2160 hours.  TSH:  No results for input(s): TSH in the last 2160 hours.  A1C:  Recent Labs   Lab Result Units 07/08/22  0900   Hemoglobin A1C % 8.1*       Assessment:         ICD-10-CM ICD-9-CM   1. Essential hypertension  I10 401.9   2. Heart failure with preserved ejection fraction, NYHA class II  I50.30 428.9   3. Stage 3a chronic kidney disease  N18.31 585.3   4. Type 2 diabetes mellitus with proliferative retinopathy of both eyes, with long-term current use of insulin, macular edema presence unspecified, unspecified proliferative retinopathy type  E11.3593 250.50    Z79.4 362.02     V58.67       Plan:       Essential hypertension  Chronic; stable on medication. Continue medication as prescribed.    Heart failure with preserved ejection fraction, NYHA class II  Followed by Cardiology.      Stage 3a chronic kidney disease  Avoid nephrotoxic drugs to include NSAIDS; decrease intake of Lasix per cardiology.    Low sodium diet.    Type 2 diabetes mellitus with proliferative retinopathy of both eyes, with long-term current use of insulin, macular edema presence unspecified, unspecified proliferative retinopathy type  -Continue Levemir 55 units twice per day.   -continue Humalog 20 units TIDWM  -Continue to monitor glucose twice per day and keep a log.  -Discussed and instructed to monitor for s/s of hypoglycemia, instructed to get some glucose tabs or have something readily available if needed.   -Increase water intake  -keep appointment with Endo in October, labs are scheduled for 10/24, have them done prior to appt  -work on diet modification, avoid late night eating/snacking on ice cream, replace with fruit.     If symptoms worsen, go to ER.  If symptoms do not improve, return to clinic.   Keep  "appointments with all specialists.     Patient verbalizes understanding and agrees with current treatment plan.      Follow up in about 3 months (around 12/2/2022).     Patient's Medications   New Prescriptions    No medications on file   Previous Medications    ALBUTEROL (PROVENTIL/VENTOLIN HFA) 90 MCG/ACTUATION INHALER    Inhale 1-2 puffs into the lungs every 6 (six) hours as needed for Wheezing. Rescue    ALFUZOSIN (UROXATRAL) 10 MG TB24    Take 1 tablet (10 mg total) by mouth daily with breakfast.    AMLODIPINE (NORVASC) 10 MG TABLET    Take 1 tablet (10 mg total) by mouth once daily.    ASPIRIN (ECOTRIN) 81 MG EC TABLET    Take 81 mg by mouth once daily.    ATORVASTATIN (LIPITOR) 80 MG TABLET    Take 1 tablet (80 mg total) by mouth every evening.    BD JERRY 2ND GEN PEN NEEDLE 32 GAUGE X 5/32" NDLE    USE AS DIRECTED THREE TIMES DAILY    BD ULTRA-FINE SHORT PEN NEEDLE 31 GAUGE X 5/16" NDLE    USE 1 PEN NEEDLE 4 TIMES DAILY    BETAMETHASONE VALERATE 0.1% (VALISONE) 0.1 % CREA    Apply topically once daily.    BLOOD SUGAR DIAGNOSTIC STRP    Test blood sugar 3 times daily    CARVEDILOL (COREG) 25 MG TABLET    Take 1 tablet (25 mg total) by mouth 2 (two) times daily with meals.    CLOTRIMAZOLE (LOTRIMIN) 1 % CREAM    Apply 1 application topically 2 (two) times daily.    FINASTERIDE (PROSCAR) 5 MG TABLET    Take 1 tablet (5 mg total) by mouth once daily.    FLUOCINOLONE (SYNALAR) 0.01 % EXTERNAL SOLUTION    APPLY  SOLUTION TOPICALLY TO SCALP ONCE DAILY IN THE EVENING    FOLIC ACID (FOLVITE) 1 MG TABLET    Take 1 tablet (1 mg total) by mouth once daily.    FUROSEMIDE (LASIX) 40 MG TABLET    TAKE ONE TABLET BY MOUTH ONCE DAILY (UP  TO  4  TABLETS  A  DAY  EXTRA,  ONLY  AS  NEEDED  FOR  SWELLING)    GLUCOSE 4 GM CHEWABLE TABLET    Take 4 tablets (16 g total) by mouth as needed for Low blood sugar.    HYDROCODONE-ACETAMINOPHEN (NORCO) 5-325 MG PER TABLET    Take 1 tablet by mouth every 6 (six) hours as needed for Pain. "    INSULIN (LANTUS SOLOSTAR U-100 INSULIN) GLARGINE 100 UNITS/ML SUBQ PEN    Inject 50 Units into the skin 2 (two) times a day.    INSULIN LISPRO (HUMALOG KWIKPEN INSULIN) 100 UNIT/ML PEN    Inject 20 Units into the skin 3 (three) times daily with meals.    KETOCONAZOLE (NIZORAL) 2 % CREAM    APPLY A THIN LAYER OF  CREAM TOPICALLY TO AFFECTED AREA (FACE) TWICE DAILY    KETOCONAZOLE (NIZORAL) 2 % SHAMPOO    USE SHAMPOO TOPICALLY ON SCALP TWICE A WEEK    LANCETS MISC    1 lancet by Misc.(Non-Drug; Combo Route) route 3 (three) times daily before meals.    LINACLOTIDE (LINZESS) 145 MCG CAP CAPSULE    Take 1 capsule (145 mcg total) by mouth once daily.    LISINOPRIL (PRINIVIL,ZESTRIL) 40 MG TABLET    Take 1 tablet (40 mg total) by mouth once daily.    METHOTREXATE 2.5 MG TAB    Take 4 tablets (10 mg total) by mouth every 7 days.    POLYETHYLENE GLYCOL (GLYCOLAX) 17 GRAM/DOSE POWDER    Take 17 g by mouth once daily.    POLYETHYLENE GLYCOL 3350 (MIRALAX ORAL)    Take by mouth.    SHINGRIX, PF, 50 MCG/0.5 ML INJECTION        SPIRONOLACTONE (ALDACTONE) 25 MG TABLET    Take 1 tablet (25 mg total) by mouth once daily.    TRIAMCINOLONE ACETONIDE 0.1% (KENALOG) 0.1 % CREAM    Mix in a 50:50 portion with your Ketoconazole cream and apply nightly to the affected areas.    TRILIPIX 135 MG CPDR    TAKE 1 CAPSULE BY MOUTH EVERY DAY    TRUE METRIX GLUCOSE METER KIT    USE  AS  INSTRUCTED    TRUEPLUS LANCETS 33 GAUGE MISC    Apply topically 3 (three) times daily.    VARDENAFIL (LEVITRA) 20 MG TABLET    Take 1 tablet (20 mg total) by mouth daily as needed for Erectile Dysfunction.   Modified Medications    No medications on file   Discontinued Medications    No medications on file         Coni Brock NP

## 2023-01-12 ENCOUNTER — OFFICE VISIT (OUTPATIENT)
Dept: FAMILY MEDICINE | Facility: CLINIC | Age: 68
End: 2023-01-12
Payer: MEDICARE

## 2023-01-12 VITALS
HEIGHT: 67 IN | OXYGEN SATURATION: 99 % | HEART RATE: 50 BPM | WEIGHT: 218.06 LBS | BODY MASS INDEX: 34.22 KG/M2 | SYSTOLIC BLOOD PRESSURE: 120 MMHG | DIASTOLIC BLOOD PRESSURE: 70 MMHG

## 2023-01-12 DIAGNOSIS — I10 ESSENTIAL HYPERTENSION: Primary | Chronic | ICD-10-CM

## 2023-01-12 DIAGNOSIS — E66.09 CLASS 1 OBESITY DUE TO EXCESS CALORIES WITH SERIOUS COMORBIDITY AND BODY MASS INDEX (BMI) OF 34.0 TO 34.9 IN ADULT: Chronic | ICD-10-CM

## 2023-01-12 DIAGNOSIS — E11.3593 TYPE 2 DIABETES MELLITUS WITH PROLIFERATIVE RETINOPATHY OF BOTH EYES, WITH LONG-TERM CURRENT USE OF INSULIN, MACULAR EDEMA PRESENCE UNSPECIFIED, UNSPECIFIED PROLIFERATIVE RETINOPATHY TYPE: Chronic | ICD-10-CM

## 2023-01-12 DIAGNOSIS — E78.2 MIXED HYPERLIPIDEMIA: Chronic | ICD-10-CM

## 2023-01-12 DIAGNOSIS — Z79.4 TYPE 2 DIABETES MELLITUS WITH PROLIFERATIVE RETINOPATHY OF BOTH EYES, WITH LONG-TERM CURRENT USE OF INSULIN, MACULAR EDEMA PRESENCE UNSPECIFIED, UNSPECIFIED PROLIFERATIVE RETINOPATHY TYPE: Chronic | ICD-10-CM

## 2023-01-12 PROCEDURE — 1126F AMNT PAIN NOTED NONE PRSNT: CPT | Mod: CPTII,S$GLB,, | Performed by: FAMILY MEDICINE

## 2023-01-12 PROCEDURE — 99214 PR OFFICE/OUTPT VISIT, EST, LEVL IV, 30-39 MIN: ICD-10-PCS | Mod: S$GLB,,, | Performed by: FAMILY MEDICINE

## 2023-01-12 PROCEDURE — 3078F PR MOST RECENT DIASTOLIC BLOOD PRESSURE < 80 MM HG: ICD-10-PCS | Mod: CPTII,S$GLB,, | Performed by: FAMILY MEDICINE

## 2023-01-12 PROCEDURE — 1126F PR PAIN SEVERITY QUANTIFIED, NO PAIN PRESENT: ICD-10-PCS | Mod: CPTII,S$GLB,, | Performed by: FAMILY MEDICINE

## 2023-01-12 PROCEDURE — 99999 PR PBB SHADOW E&M-EST. PATIENT-LVL V: ICD-10-PCS | Mod: PBBFAC,,, | Performed by: FAMILY MEDICINE

## 2023-01-12 PROCEDURE — 99999 PR PBB SHADOW E&M-EST. PATIENT-LVL V: CPT | Mod: PBBFAC,,, | Performed by: FAMILY MEDICINE

## 2023-01-12 PROCEDURE — 3288F PR FALLS RISK ASSESSMENT DOCUMENTED: ICD-10-PCS | Mod: CPTII,S$GLB,, | Performed by: FAMILY MEDICINE

## 2023-01-12 PROCEDURE — 1101F PR PT FALLS ASSESS DOC 0-1 FALLS W/OUT INJ PAST YR: ICD-10-PCS | Mod: CPTII,S$GLB,, | Performed by: FAMILY MEDICINE

## 2023-01-12 PROCEDURE — 3074F SYST BP LT 130 MM HG: CPT | Mod: CPTII,S$GLB,, | Performed by: FAMILY MEDICINE

## 2023-01-12 PROCEDURE — 3074F PR MOST RECENT SYSTOLIC BLOOD PRESSURE < 130 MM HG: ICD-10-PCS | Mod: CPTII,S$GLB,, | Performed by: FAMILY MEDICINE

## 2023-01-12 PROCEDURE — 1159F PR MEDICATION LIST DOCUMENTED IN MEDICAL RECORD: ICD-10-PCS | Mod: CPTII,S$GLB,, | Performed by: FAMILY MEDICINE

## 2023-01-12 PROCEDURE — 3288F FALL RISK ASSESSMENT DOCD: CPT | Mod: CPTII,S$GLB,, | Performed by: FAMILY MEDICINE

## 2023-01-12 PROCEDURE — 3008F PR BODY MASS INDEX (BMI) DOCUMENTED: ICD-10-PCS | Mod: CPTII,S$GLB,, | Performed by: FAMILY MEDICINE

## 2023-01-12 PROCEDURE — 1159F MED LIST DOCD IN RCRD: CPT | Mod: CPTII,S$GLB,, | Performed by: FAMILY MEDICINE

## 2023-01-12 PROCEDURE — 1101F PT FALLS ASSESS-DOCD LE1/YR: CPT | Mod: CPTII,S$GLB,, | Performed by: FAMILY MEDICINE

## 2023-01-12 PROCEDURE — 1160F PR REVIEW ALL MEDS BY PRESCRIBER/CLIN PHARMACIST DOCUMENTED: ICD-10-PCS | Mod: CPTII,S$GLB,, | Performed by: FAMILY MEDICINE

## 2023-01-12 PROCEDURE — 1160F RVW MEDS BY RX/DR IN RCRD: CPT | Mod: CPTII,S$GLB,, | Performed by: FAMILY MEDICINE

## 2023-01-12 PROCEDURE — 99214 OFFICE O/P EST MOD 30 MIN: CPT | Mod: S$GLB,,, | Performed by: FAMILY MEDICINE

## 2023-01-12 PROCEDURE — 3008F BODY MASS INDEX DOCD: CPT | Mod: CPTII,S$GLB,, | Performed by: FAMILY MEDICINE

## 2023-01-12 PROCEDURE — 3078F DIAST BP <80 MM HG: CPT | Mod: CPTII,S$GLB,, | Performed by: FAMILY MEDICINE

## 2023-01-12 RX ORDER — VARDENAFIL HYDROCHLORIDE 20 MG/1
TABLET ORAL
Qty: 15 TABLET | Refills: 7 | Status: SHIPPED | OUTPATIENT
Start: 2023-01-12 | End: 2023-04-24 | Stop reason: SDUPTHER

## 2023-01-12 NOTE — PATIENT INSTRUCTIONS
DECREASE LANTUS TO 38 UNITS TWICE A DAY  CONTINUE HUMALOG 15-20 UNITS WITH MEALS  FOLLOW UP WITH ENDOCRINOLOGY

## 2023-01-12 NOTE — PROGRESS NOTES
PATIENT VISIT FAMILY MEDICINE    CC:   Chief Complaint   Patient presents with    Follow-up    Hypertension    Diabetes       HPI: Brando Melgoza  is a 67 y.o. male:    Patient is known to me.  Patient presents alone for routine follow up on chronic conditions.    Type 2 DM  Seeing Endo  Taking Jardiance  Taking lantus 40 u BID  Taking humalog 15U TID AC  Notes lows around 12PM and 2AM    Otherwise, patient doing well overall, taking medications as prescribed     ROS: Review of Systems   Constitutional:  Negative for fever.   Respiratory:  Negative for shortness of breath.    Cardiovascular:  Negative for chest pain.     PMHX:   Past Medical History:   Diagnosis Date    Cancer     colon    Cataract     Chronic kidney disease     Diabetes mellitus     Diabetes mellitus, type 2     Hyperlipidemia     Hypertension        PSHX:   Past Surgical History:   Procedure Laterality Date    AMPUTATION Right     below the knee    CARDIAC CATHETERIZATION      CATARACT EXTRACTION      COLON SURGERY      COLONOSCOPY      COLONOSCOPY N/A 5/22/2018    Procedure: COLONOSCOPY;  Surgeon: Hallie Higuera MD;  Location: [facility];  Service: Endoscopy;  Laterality: N/A;    COLONOSCOPY N/A 6/12/2018    Procedure: COLONOSCOPY;  Surgeon: Hallie Higuera MD;  Location: [facility];  Service: Endoscopy;  Laterality: N/A;    COLONOSCOPY N/A 6/7/2022    Procedure: COLONOSCOPY;  Surgeon: Hallie Higuera MD;  Location: [facility];  Service: Endoscopy;  Laterality: N/A;    SKIN BIOPSY         FAMHX:   Family History   Problem Relation Age of Onset    Diabetes Mother     Hypertension Father     Diabetes Sister     Diabetes Brother     Hypertension Brother     No Known Problems Paternal Grandmother     No Known Problems Paternal Grandfather     No Known Problems Maternal Grandmother     No Known Problems Maternal Grandfather        SOCHX:   Social History     Socioeconomic History    Marital status:     Number of children: 1     Years of education: 12   Tobacco Use    Smoking status: Never    Smokeless tobacco: Never   Substance and Sexual Activity    Alcohol use: Yes     Comment: occ    Drug use: No    Sexual activity: Yes     Partners: Female       ALLERGIES: Review of patient's allergies indicates:  No Known Allergies    MEDS:   Current Outpatient Medications:     albuterol (PROVENTIL/VENTOLIN HFA) 90 mcg/actuation inhaler, Inhale 1-2 puffs into the lungs every 6 (six) hours as needed for Wheezing. Rescue, Disp: 1 Inhaler, Rfl: 0    alfuzosin (UROXATRAL) 10 mg Tb24, Take 1 tablet (10 mg total) by mouth daily with breakfast., Disp: 90 tablet, Rfl: 3    amLODIPine (NORVASC) 10 MG tablet, Take 1 tablet by mouth once daily, Disp: 90 tablet, Rfl: 3    aspirin (ECOTRIN) 81 MG EC tablet, Take 81 mg by mouth once daily., Disp: , Rfl:     atorvastatin (LIPITOR) 80 MG tablet, Take 1 tablet (80 mg total) by mouth every evening., Disp: 90 tablet, Rfl: 3    betamethasone valerate 0.1% (VALISONE) 0.1 % Crea, Apply topically once daily., Disp: 45 g, Rfl: 2    carvediloL (COREG) 25 MG tablet, Take 1 tablet (25 mg total) by mouth 2 (two) times daily with meals., Disp: 180 tablet, Rfl: 3    clotrimazole (LOTRIMIN) 1 % cream, Apply 1 application topically 2 (two) times daily., Disp: , Rfl:     empagliflozin (JARDIANCE) 10 mg tablet, Take 1 tablet (10 mg total) by mouth once daily., Disp: 90 tablet, Rfl: 0    finasteride (PROSCAR) 5 mg tablet, Take 1 tablet (5 mg total) by mouth once daily., Disp: 90 tablet, Rfl: 3    fluocinolone (SYNALAR) 0.01 % external solution, APPLY  SOLUTION TOPICALLY TO SCALP ONCE DAILY IN THE EVENING, Disp: 60 mL, Rfl: 1    folic acid (FOLVITE) 1 MG tablet, Take 1 tablet (1 mg total) by mouth once daily., Disp: 90 tablet, Rfl: 3    furosemide (LASIX) 40 MG tablet, TAKE ONE TABLET BY MOUTH ONCE DAILY (UP  TO  4  TABLETS  A  DAY  EXTRA,  ONLY  AS  NEEDED  FOR  SWELLING), Disp: 180 tablet, Rfl: 11    glucagon 3 mg/actuation [place], 1  "each by Nasal route as needed (use for severe low blood sugar)., Disp: 1 each, Rfl: 11    insulin lispro (HUMALOG KWIKPEN INSULIN) 100 unit/mL pen, Inject 20 Units into the skin 3 (three) times daily with meals., Disp: 18 mL, Rfl: 11    ketoconazole (NIZORAL) 2 % cream, APPLY A THIN LAYER OF  CREAM TOPICALLY TO AFFECTED AREA (FACE) TWICE DAILY, Disp: 120 g, Rfl: 3    ketoconazole (NIZORAL) 2 % shampoo, USE SHAMPOO TOPICALLY ON SCALP TWICE A WEEK, Disp: 120 mL, Rfl: 5    LANTUS SOLOSTAR U-100 INSULIN glargine 100 units/mL SubQ pen, INJECT 50 UNITS SUBCUTANEOUSLY TWICE DAILY, Disp: 90 mL, Rfl: 0    linaCLOtide (LINZESS) 145 mcg Cap capsule, Take 1 capsule (145 mcg total) by mouth once daily., Disp: 30 capsule, Rfl: 3    lisinopriL (PRINIVIL,ZESTRIL) 40 MG tablet, Take 1 tablet (40 mg total) by mouth once daily., Disp: 90 tablet, Rfl: 3    methotrexate 2.5 MG Tab, Take 4 tablets (10 mg total) by mouth every 7 days., Disp: 16 tablet, Rfl: 3    polyethylene glycol (GLYCOLAX) 17 gram/dose powder, Take 17 g by mouth once daily., Disp: 1700 g, Rfl: 11    spironolactone (ALDACTONE) 25 MG tablet, Take 1 tablet by mouth once daily, Disp: 90 tablet, Rfl: 3    triamcinolone acetonide 0.1% (KENALOG) 0.1 % cream, Mix in a 50:50 portion with your Ketoconazole cream and apply nightly to the affected areas., Disp: 45 g, Rfl: 1    TRILIPIX 135 mg CpDR, TAKE 1 CAPSULE BY MOUTH EVERY DAY, Disp: 30 capsule, Rfl: 6    BD JERRY 2ND GEN PEN NEEDLE 32 gauge x 5/32" Ndle, USE AS DIRECTED THREE TIMES DAILY, Disp: 100 each, Rfl: 0    BD ULTRA-FINE SHORT PEN NEEDLE 31 gauge x 5/16" Ndle, USE 1 PEN NEEDLE 4 TIMES DAILY, Disp: , Rfl:     blood sugar diagnostic Strp, Test blood sugar 3 times daily, Disp: 300 strip, Rfl: 3    blood-glucose meter kit, One touch verio kit, Disp: 1 each, Rfl: 0    blood-glucose meter,continuous (DEXCOM G6 ) Misc, 1 Device by Misc.(Non-Drug; Combo Route) route once. Dispense 1 device for 1 dose, Disp: 1 each, " "Rfl: 0    blood-glucose sensor (DEXCOM G6 SENSOR) [place]te, 1 each by Misc.(Non-Drug; Combo Route) route every 10 days. Change once every 10 days. Disp 3pk/month., Disp: 9 each, Rfl: 3    blood-glucose transmitter (DEXCOM G6 TRANSMITTER) [place]te, 1 Device by Misc.(Non-Drug; Combo Route) route As instructed. Change transmitter every 90 days, Disp: 1 each, Rfl: 3    glucose 4 GM chewable tablet, Take 4 tablets (16 g total) by mouth as needed for Low blood sugar., Disp: 30 tablet, Rfl: 12    lancets Misc, 1 lancet by Misc.(Non-Drug; Combo Route) route 3 (three) times daily before meals., Disp: 300 each, Rfl: 3    POLYETHYLENE GLYCOL 3350 (MIRALAX ORAL), Take by mouth., Disp: , Rfl:     SHINGRIX, PF, 50 mcg/0.5 mL injection, , Disp: , Rfl:     TRUEPLUS LANCETS 33 gauge Misc, Apply topically 3 (three) times daily., Disp: , Rfl:     vardenafiL (LEVITRA) 20 MG tablet, TAKE 1 TABLET (20 MG TOTAL) BY MOUTH DAILY AS NEEDED FOR ERECTILE DYSFUNCTION., Disp: 15 tablet, Rfl: 7    OBJECTIVE:   Vitals:    01/12/23 0820   BP: 120/70   BP Location: Left arm   Patient Position: Sitting   BP Method: Large (Manual)   Pulse: (!) 50   SpO2: 99%   Weight: 98.9 kg (218 lb 0.6 oz)   Height: 5' 7" (1.702 m)     Body mass index is 34.15 kg/m².      Physical Exam  Vitals and nursing note reviewed.   Constitutional:       Appearance: Normal appearance.   HENT:      Head: Normocephalic.   Eyes:      General:         Right eye: No discharge.         Left eye: No discharge.      Extraocular Movements: Extraocular movements intact.   Cardiovascular:      Rate and Rhythm: Normal rate and regular rhythm.      Heart sounds: Normal heart sounds.   Pulmonary:      Effort: Pulmonary effort is normal.      Breath sounds: Normal breath sounds.   Skin:     Comments: No obvious rash on exposed skin   Neurological:      Mental Status: He is alert.   Psychiatric:         Behavior: Behavior normal.         LABS:   A1C:  Recent Labs   Lab 10/25/22  0733   Hemoglobin A1C " 7.8 H     CBC:  Recent Labs   Lab 01/05/22  0919   WBC 7.93   RBC 4.72   Hemoglobin 14.6   Hematocrit 43.7   Platelets 238   MCV 93   MCH 30.9   MCHC 33.4     CMP:  Recent Labs   Lab 10/25/22  0733   Glucose 128 H   Calcium 9.7   Albumin 4.4   Total Protein 7.7   Sodium 146 H   Potassium 5.0   CO2 28   Chloride 107   BUN 23 H   Creatinine 1.38   Alkaline Phosphatase 77   ALT 18   AST 24   Total Bilirubin 0.6     LIPIDS:  Recent Labs   Lab 10/25/22  0733   HDL 26 L   Cholesterol 94 L   Triglycerides 84   LDL Cholesterol 51.2 L   HDL/Cholesterol Ratio 27.7   Non-HDL Cholesterol 68   Total Cholesterol/HDL Ratio 3.6     TSH:          ASSESSMENT & PLAN:    Problem List Items Addressed This Visit          Cardiac/Vascular    Essential hypertension - Primary (Chronic)    Overview     Well controlled. Continue current regimen           HLD (hyperlipidemia) (Chronic)    Overview     Continue statin            Endocrine    Class 1 obesity due to excess calories with serious comorbidity and body mass index (BMI) of 34.0 to 34.9 in adult (Chronic)    Overview     The patient is asked to make an attempt to improve diet and exercise patterns to aid in medical management of this problem.         DM (diabetes mellitus) (Chronic)    Overview     Decrease lantus to 38U BID. Continue other medications. Given appt dates/time for Endo              Follow up in about 6 months (around 7/12/2023) for blood pressure, diabetes.      RTC/ED precautions discussed where applicable.   Encouraged patient to let me know if there are any further questions/concerns.     Advise patient/caretaker to check with insurance regarding orders to avoid unexpected fees/costs.     The patient/caretaker indicates understanding of these issues and agrees with the plan.    Dr. Preethi Willard MD  Family Medicine

## 2023-07-26 ENCOUNTER — OFFICE VISIT (OUTPATIENT)
Dept: FAMILY MEDICINE | Facility: CLINIC | Age: 68
End: 2023-07-26
Payer: MEDICARE

## 2023-07-26 VITALS
WEIGHT: 196.88 LBS | OXYGEN SATURATION: 97 % | SYSTOLIC BLOOD PRESSURE: 102 MMHG | HEART RATE: 63 BPM | BODY MASS INDEX: 30.9 KG/M2 | HEIGHT: 67 IN | DIASTOLIC BLOOD PRESSURE: 52 MMHG

## 2023-07-26 DIAGNOSIS — Z79.4 TYPE 2 DIABETES MELLITUS WITH PROLIFERATIVE RETINOPATHY OF BOTH EYES, WITH LONG-TERM CURRENT USE OF INSULIN, MACULAR EDEMA PRESENCE UNSPECIFIED, UNSPECIFIED PROLIFERATIVE RETINOPATHY TYPE: Chronic | ICD-10-CM

## 2023-07-26 DIAGNOSIS — E66.09 CLASS 1 OBESITY DUE TO EXCESS CALORIES WITH SERIOUS COMORBIDITY AND BODY MASS INDEX (BMI) OF 30.0 TO 30.9 IN ADULT: ICD-10-CM

## 2023-07-26 DIAGNOSIS — Z89.511 S/P BKA (BELOW KNEE AMPUTATION), RIGHT: Chronic | ICD-10-CM

## 2023-07-26 DIAGNOSIS — E78.2 MIXED HYPERLIPIDEMIA: Chronic | ICD-10-CM

## 2023-07-26 DIAGNOSIS — E11.3593 TYPE 2 DIABETES MELLITUS WITH PROLIFERATIVE RETINOPATHY OF BOTH EYES, WITH LONG-TERM CURRENT USE OF INSULIN, MACULAR EDEMA PRESENCE UNSPECIFIED, UNSPECIFIED PROLIFERATIVE RETINOPATHY TYPE: Chronic | ICD-10-CM

## 2023-07-26 DIAGNOSIS — I73.9 PAD (PERIPHERAL ARTERY DISEASE): Chronic | ICD-10-CM

## 2023-07-26 DIAGNOSIS — I10 ESSENTIAL HYPERTENSION: Primary | Chronic | ICD-10-CM

## 2023-07-26 DIAGNOSIS — I50.30 HEART FAILURE WITH PRESERVED EJECTION FRACTION, NYHA CLASS II: ICD-10-CM

## 2023-07-26 DIAGNOSIS — N40.1 BENIGN PROSTATIC HYPERPLASIA WITH LOWER URINARY TRACT SYMPTOMS, SYMPTOM DETAILS UNSPECIFIED: Chronic | ICD-10-CM

## 2023-07-26 PROCEDURE — 4010F PR ACE/ARB THEARPY RXD/TAKEN: ICD-10-PCS | Mod: CPTII,S$GLB,, | Performed by: FAMILY MEDICINE

## 2023-07-26 PROCEDURE — 3074F PR MOST RECENT SYSTOLIC BLOOD PRESSURE < 130 MM HG: ICD-10-PCS | Mod: CPTII,S$GLB,, | Performed by: FAMILY MEDICINE

## 2023-07-26 PROCEDURE — 1101F PT FALLS ASSESS-DOCD LE1/YR: CPT | Mod: CPTII,S$GLB,, | Performed by: FAMILY MEDICINE

## 2023-07-26 PROCEDURE — 99214 OFFICE O/P EST MOD 30 MIN: CPT | Mod: S$GLB,,, | Performed by: FAMILY MEDICINE

## 2023-07-26 PROCEDURE — 3074F SYST BP LT 130 MM HG: CPT | Mod: CPTII,S$GLB,, | Performed by: FAMILY MEDICINE

## 2023-07-26 PROCEDURE — 1101F PR PT FALLS ASSESS DOC 0-1 FALLS W/OUT INJ PAST YR: ICD-10-PCS | Mod: CPTII,S$GLB,, | Performed by: FAMILY MEDICINE

## 2023-07-26 PROCEDURE — 3288F FALL RISK ASSESSMENT DOCD: CPT | Mod: CPTII,S$GLB,, | Performed by: FAMILY MEDICINE

## 2023-07-26 PROCEDURE — 3078F PR MOST RECENT DIASTOLIC BLOOD PRESSURE < 80 MM HG: ICD-10-PCS | Mod: CPTII,S$GLB,, | Performed by: FAMILY MEDICINE

## 2023-07-26 PROCEDURE — 3044F HG A1C LEVEL LT 7.0%: CPT | Mod: CPTII,S$GLB,, | Performed by: FAMILY MEDICINE

## 2023-07-26 PROCEDURE — 99999 PR PBB SHADOW E&M-EST. PATIENT-LVL V: CPT | Mod: PBBFAC,,, | Performed by: FAMILY MEDICINE

## 2023-07-26 PROCEDURE — 99214 PR OFFICE/OUTPT VISIT, EST, LEVL IV, 30-39 MIN: ICD-10-PCS | Mod: S$GLB,,, | Performed by: FAMILY MEDICINE

## 2023-07-26 PROCEDURE — 3008F PR BODY MASS INDEX (BMI) DOCUMENTED: ICD-10-PCS | Mod: CPTII,S$GLB,, | Performed by: FAMILY MEDICINE

## 2023-07-26 PROCEDURE — 1126F AMNT PAIN NOTED NONE PRSNT: CPT | Mod: CPTII,S$GLB,, | Performed by: FAMILY MEDICINE

## 2023-07-26 PROCEDURE — 1126F PR PAIN SEVERITY QUANTIFIED, NO PAIN PRESENT: ICD-10-PCS | Mod: CPTII,S$GLB,, | Performed by: FAMILY MEDICINE

## 2023-07-26 PROCEDURE — 3288F PR FALLS RISK ASSESSMENT DOCUMENTED: ICD-10-PCS | Mod: CPTII,S$GLB,, | Performed by: FAMILY MEDICINE

## 2023-07-26 PROCEDURE — 3078F DIAST BP <80 MM HG: CPT | Mod: CPTII,S$GLB,, | Performed by: FAMILY MEDICINE

## 2023-07-26 PROCEDURE — 3044F PR MOST RECENT HEMOGLOBIN A1C LEVEL <7.0%: ICD-10-PCS | Mod: CPTII,S$GLB,, | Performed by: FAMILY MEDICINE

## 2023-07-26 PROCEDURE — 99999 PR PBB SHADOW E&M-EST. PATIENT-LVL V: ICD-10-PCS | Mod: PBBFAC,,, | Performed by: FAMILY MEDICINE

## 2023-07-26 PROCEDURE — 3008F BODY MASS INDEX DOCD: CPT | Mod: CPTII,S$GLB,, | Performed by: FAMILY MEDICINE

## 2023-07-26 PROCEDURE — 4010F ACE/ARB THERAPY RXD/TAKEN: CPT | Mod: CPTII,S$GLB,, | Performed by: FAMILY MEDICINE

## 2023-07-26 RX ORDER — AMLODIPINE BESYLATE 5 MG/1
5 TABLET ORAL DAILY
Qty: 90 TABLET | Refills: 3 | Status: SHIPPED | OUTPATIENT
Start: 2023-07-26 | End: 2023-08-24

## 2023-07-26 NOTE — PROGRESS NOTES
PATIENT VISIT FAMILY MEDICINE    CC:   Chief Complaint   Patient presents with    Hypertension       HPI: Brando Melgoza  is a 68 y.o. male:    Patient is known to me.  Patient presents alone for routine follow up on chronic conditions.    Was told by Endo to cut spironolactone in half. Makes a pillbox of all his medications so not sure which is which. Does not have medications with him today.       PMHX:   Past Medical History:   Diagnosis Date    Cancer     colon    Cataract     Chronic kidney disease     Diabetes mellitus     Diabetes mellitus, type 2     Hyperlipidemia     Hypertension        PSHX:   Past Surgical History:   Procedure Laterality Date    AMPUTATION Right     below the knee    CARDIAC CATHETERIZATION      CATARACT EXTRACTION      COLON SURGERY      COLONOSCOPY      COLONOSCOPY N/A 5/22/2018    Procedure: COLONOSCOPY;  Surgeon: Hallie Higuera MD;  Location: [facility];  Service: Endoscopy;  Laterality: N/A;    COLONOSCOPY N/A 6/12/2018    Procedure: COLONOSCOPY;  Surgeon: Hallie Higuera MD;  Location: [facility];  Service: Endoscopy;  Laterality: N/A;    COLONOSCOPY N/A 6/7/2022    Procedure: COLONOSCOPY;  Surgeon: Hallie Higuera MD;  Location: [facility];  Service: Endoscopy;  Laterality: N/A;    SKIN BIOPSY         FAMHX:   Family History   Problem Relation Age of Onset    Diabetes Mother     Hypertension Father     Diabetes Sister     Diabetes Brother     Hypertension Brother     No Known Problems Paternal Grandmother     No Known Problems Paternal Grandfather     No Known Problems Maternal Grandmother     No Known Problems Maternal Grandfather        SOCHX:   Social History     Socioeconomic History    Marital status:     Number of children: 1    Years of education: 12   Tobacco Use    Smoking status: Never    Smokeless tobacco: Never   Substance and Sexual Activity    Alcohol use: Yes     Comment: occ    Drug use: No    Sexual activity: Yes     Partners: Female  "      ALLERGIES: Review of patient's allergies indicates:  No Known Allergies    MEDS:   Current Outpatient Medications:     alfuzosin (UROXATRAL) 10 mg Tb24, Take 1 tablet (10 mg total) by mouth daily with breakfast., Disp: 90 tablet, Rfl: 3    aspirin (ECOTRIN) 81 MG EC tablet, Take 81 mg by mouth once daily., Disp: , Rfl:     atorvastatin (LIPITOR) 80 MG tablet, Take 1 tablet (80 mg total) by mouth every evening., Disp: 90 tablet, Rfl: 3    betamethasone valerate 0.1% (VALISONE) 0.1 % Crea, Apply topically once daily., Disp: 45 g, Rfl: 2    blood sugar diagnostic Strp, Test blood sugar 3 times daily, Disp: 300 strip, Rfl: 3    blood-glucose meter kit, One touch verio kit, Disp: 1 each, Rfl: 0    carvediloL (COREG) 25 MG tablet, TAKE 1 TABLET BY MOUTH TWICE DAILY WITH MEALS, Disp: 180 tablet, Rfl: 3    clotrimazole (LOTRIMIN) 1 % cream, Apply 1 application topically 2 (two) times daily., Disp: , Rfl:     fluocinolone (SYNALAR) 0.01 % external solution, APPLY  SOLUTION TOPICALLY TO SCALP ONCE DAILY IN THE EVENING, Disp: 60 mL, Rfl: 1    glucagon 3 mg/actuation Spry, 1 each by Nasal route as needed (use for severe low blood sugar)., Disp: 1 each, Rfl: 11    HYDROcodone-acetaminophen (NORCO) 7.5-325 mg per tablet, Take 1 tablet by mouth every 6 (six) hours as needed for Pain., Disp: 28 tablet, Rfl: 0    insulin lispro (HUMALOG KWIKPEN INSULIN) 100 unit/mL pen, Inject 20 Units into the skin 3 (three) times daily with meals., Disp: 18 mL, Rfl: 11    ketoconazole (NIZORAL) 2 % cream, APPLY A THIN LAYER OF  CREAM TOPICALLY TO AFFECTED AREA (FACE) TWICE DAILY, Disp: 120 g, Rfl: 3    ketoconazole (NIZORAL) 2 % shampoo, USE SHAMPOO TOPICALLY ON SCALP TWICE A WEEK, Disp: 120 mL, Rfl: 5    lancets Misc, 1 lancet by Misc.(Non-Drug; Combo Route) route 3 (three) times daily before meals., Disp: 300 each, Rfl: 3    pen needle, diabetic (BD JERRY 2ND GEN PEN NEEDLE) 32 gauge x 5/32" Ndle, Inject 1 each into the skin 4 (four) times " daily with meals and nightly., Disp: 360 each, Rfl: 3    POLYETHYLENE GLYCOL 3350 (MIRALAX ORAL), Take by mouth., Disp: , Rfl:     SHINGRIX, PF, 50 mcg/0.5 mL injection, , Disp: , Rfl:     spironolactone (ALDACTONE) 25 MG tablet, Take 1/2 tablet every day, Disp: 30 tablet, Rfl: 6    triamcinolone acetonide 0.1% (KENALOG) 0.1 % cream, Mix in a 50:50 portion with your Ketoconazole cream and apply nightly to the affected areas., Disp: 45 g, Rfl: 1    TRILIPIX 135 mg CpDR, TAKE 1 CAPSULE BY MOUTH EVERY DAY, Disp: 30 capsule, Rfl: 6    TRUEPLUS LANCETS 33 gauge Misc, Apply topically 3 (three) times daily., Disp: , Rfl:     vardenafiL (LEVITRA) 20 MG tablet, TAKE 1 TABLET (20 MG TOTAL) BY MOUTH DAILY AS NEEDED FOR ERECTILE DYSFUNCTION., Disp: 15 tablet, Rfl: 7    alcohol swabs PadM, Apply 1 each topically as needed (use to help with adhesiveness of Dexcom cgm). IV prep, Disp: 50 each, Rfl: 11    amLODIPine (NORVASC) 5 MG tablet, Take 1 tablet (5 mg total) by mouth once daily., Disp: 90 tablet, Rfl: 3    blood-glucose meter,continuous (DEXCOM G6 ) Misc, 1 Device by Misc.(Non-Drug; Combo Route) route once. Dispense 1 device for 1 dose, Disp: 1 each, Rfl: 0    blood-glucose sensor (DEXCOM G6 SENSOR) Lynnette, 1 each by Misc.(Non-Drug; Combo Route) route every 10 days. Change once every 10 days. Disp 3pk/month., Disp: 9 each, Rfl: 3    blood-glucose transmitter (DEXCOM G6 TRANSMITTER) Lynnette, 1 Device by Misc.(Non-Drug; Combo Route) route every 3 (three) months. Change transmitter every 90 days, Disp: 1 each, Rfl: 3    empagliflozin (JARDIANCE) 25 mg tablet, Take 1 tablet (25 mg total) by mouth once daily., Disp: 90 tablet, Rfl: 1    glucose 4 GM chewable tablet, Take 4 tablets (16 g total) by mouth as needed for Low blood sugar., Disp: 30 tablet, Rfl: 12    insulin (LANTUS SOLOSTAR U-100 INSULIN) glargine 100 units/mL SubQ pen, Inject 40 Units into the skin every evening., Disp: 36 mL, Rfl: 1    lisinopriL  "(PRINIVIL,ZESTRIL) 20 MG tablet, Take 1 tablet (20 mg total) by mouth once daily., Disp: 90 tablet, Rfl: 3    methotrexate 2.5 MG Tab, Take 4 tablets (10 mg total) by mouth every 7 days. (Patient not taking: Reported on 8/2/2023), Disp: 16 tablet, Rfl: 3    mupirocin (BACTROBAN) 2 % ointment, Apply topically 3 (three) times daily., Disp: 15 g, Rfl: 3    OBJECTIVE:   Vitals:    07/26/23 1310   BP: (!) 102/52   BP Location: Left arm   Patient Position: Sitting   BP Method: Large (Manual)   Pulse: 63   SpO2: 97%   Weight: 89.3 kg (196 lb 14.4 oz)   Height: 5' 7" (1.702 m)     Body mass index is 30.84 kg/m².      Physical Exam  Vitals and nursing note reviewed.   Constitutional:       Appearance: Normal appearance.   HENT:      Head: Normocephalic.   Eyes:      General:         Right eye: No discharge.         Left eye: No discharge.      Extraocular Movements: Extraocular movements intact.   Cardiovascular:      Rate and Rhythm: Normal rate and regular rhythm.      Heart sounds: Normal heart sounds.   Pulmonary:      Effort: Pulmonary effort is normal.      Breath sounds: Normal breath sounds.   Skin:     Comments: No obvious rash on exposed skin   Neurological:      Mental Status: He is alert.   Psychiatric:         Behavior: Behavior normal.           LABS:   A1C:  Recent Labs   Lab 07/26/23  1418   Hemoglobin A1C 5.6     CBC:  Recent Labs   Lab 07/26/23  1418   WBC 8.47   RBC 4.77   Hemoglobin 15.2   Hematocrit 44.3   Platelets 225   MCV 93   MCH 31.9 H   MCHC 34.3     CMP:  Recent Labs   Lab 07/26/23  1418   Glucose 78   Calcium 9.4   Albumin 4.3   Total Protein 7.5   Sodium 143   Potassium 4.1   CO2 24   Chloride 105   BUN 31 H   Creatinine 1.30   Alkaline Phosphatase 78   ALT 17   AST 27   Total Bilirubin 0.8     LIPIDS:  Recent Labs   Lab 07/26/23  1418   HDL 22 L   Cholesterol 91 L   Triglycerides 81   LDL Cholesterol 52.8 L   HDL/Cholesterol Ratio 24.2   Non-HDL Cholesterol 69   Total Cholesterol/HDL Ratio 4.1 "     TSH:          ASSESSMENT & PLAN:    Problem List Items Addressed This Visit          Cardiac/Vascular    Essential hypertension - Primary (Chronic)    Overview     Low normal with symptoms. Advise he decrease amlodipine. He does not have medications with him today; advised to bring to next appt. Close f/u         Relevant Orders    CBC Auto Differential (Completed)    HLD (hyperlipidemia) (Chronic)    Overview     Stable. Continue statin         Relevant Orders    Lipid Panel (Completed)    PAD (peripheral artery disease) (Chronic)    Overview     Stable. Continue statin, ASA         Heart failure with preserved ejection fraction, NYHA class II    Overview     Compensated. NYHA II  Stress Echo 2018 EF 55%            Renal/    BPH (benign prostatic hyperplasia) (Chronic)    Overview     Stable. Followed by Urology            Endocrine    DM (diabetes mellitus) (Chronic)    Overview     Stable. Continue current meds. Followed by Endocrine.         Class 1 obesity due to excess calories with serious comorbidity and body mass index (BMI) of 30.0 to 30.9 in adult    Overview     Improved. The patient is asked to make an attempt to improve diet and exercise patterns to aid in medical management of this problem.            Orthopedic    S/P BKA (below knee amputation), right (Chronic)    Overview     With prosthesis              Follow up in about 1 week (around 8/2/2023) for blood pressure, med review - please bring medications.      RTC/ED precautions discussed where applicable.   Encouraged patient to let me know if there are any further questions/concerns.     Advise patient/caretaker to check with insurance regarding orders to avoid unexpected fees/costs.     The patient/caretaker indicates understanding of these issues and agrees with the plan.    Dr. Preethi Willard MD  Family Medicine

## 2023-08-02 ENCOUNTER — OFFICE VISIT (OUTPATIENT)
Dept: FAMILY MEDICINE | Facility: CLINIC | Age: 68
End: 2023-08-02
Payer: MEDICARE

## 2023-08-02 VITALS
BODY MASS INDEX: 30.81 KG/M2 | OXYGEN SATURATION: 96 % | WEIGHT: 196.31 LBS | HEIGHT: 67 IN | DIASTOLIC BLOOD PRESSURE: 58 MMHG | SYSTOLIC BLOOD PRESSURE: 100 MMHG | HEART RATE: 71 BPM

## 2023-08-02 DIAGNOSIS — I10 ESSENTIAL HYPERTENSION: Primary | Chronic | ICD-10-CM

## 2023-08-02 DIAGNOSIS — E11.3593 TYPE 2 DIABETES MELLITUS WITH PROLIFERATIVE RETINOPATHY OF BOTH EYES, WITH LONG-TERM CURRENT USE OF INSULIN, MACULAR EDEMA PRESENCE UNSPECIFIED, UNSPECIFIED PROLIFERATIVE RETINOPATHY TYPE: Chronic | ICD-10-CM

## 2023-08-02 DIAGNOSIS — Z79.4 TYPE 2 DIABETES MELLITUS WITH PROLIFERATIVE RETINOPATHY OF BOTH EYES, WITH LONG-TERM CURRENT USE OF INSULIN, MACULAR EDEMA PRESENCE UNSPECIFIED, UNSPECIFIED PROLIFERATIVE RETINOPATHY TYPE: Chronic | ICD-10-CM

## 2023-08-02 DIAGNOSIS — I42.9 CARDIOMYOPATHY, UNSPECIFIED TYPE: ICD-10-CM

## 2023-08-02 DIAGNOSIS — N40.1 BENIGN PROSTATIC HYPERPLASIA WITH LOWER URINARY TRACT SYMPTOMS, SYMPTOM DETAILS UNSPECIFIED: Chronic | ICD-10-CM

## 2023-08-02 DIAGNOSIS — I50.30 HEART FAILURE WITH PRESERVED EJECTION FRACTION, NYHA CLASS II: ICD-10-CM

## 2023-08-02 DIAGNOSIS — E78.2 MIXED HYPERLIPIDEMIA: Chronic | ICD-10-CM

## 2023-08-02 PROCEDURE — 99214 PR OFFICE/OUTPT VISIT, EST, LEVL IV, 30-39 MIN: ICD-10-PCS | Mod: S$GLB,,,

## 2023-08-02 PROCEDURE — 3288F FALL RISK ASSESSMENT DOCD: CPT | Mod: CPTII,S$GLB,,

## 2023-08-02 PROCEDURE — 1101F PT FALLS ASSESS-DOCD LE1/YR: CPT | Mod: CPTII,S$GLB,,

## 2023-08-02 PROCEDURE — 3044F HG A1C LEVEL LT 7.0%: CPT | Mod: CPTII,S$GLB,,

## 2023-08-02 PROCEDURE — 1126F AMNT PAIN NOTED NONE PRSNT: CPT | Mod: CPTII,S$GLB,,

## 2023-08-02 PROCEDURE — 3078F DIAST BP <80 MM HG: CPT | Mod: CPTII,S$GLB,,

## 2023-08-02 PROCEDURE — 99999 PR PBB SHADOW E&M-EST. PATIENT-LVL V: CPT | Mod: PBBFAC,,,

## 2023-08-02 PROCEDURE — 3078F PR MOST RECENT DIASTOLIC BLOOD PRESSURE < 80 MM HG: ICD-10-PCS | Mod: CPTII,S$GLB,,

## 2023-08-02 PROCEDURE — 3044F PR MOST RECENT HEMOGLOBIN A1C LEVEL <7.0%: ICD-10-PCS | Mod: CPTII,S$GLB,,

## 2023-08-02 PROCEDURE — 99214 OFFICE O/P EST MOD 30 MIN: CPT | Mod: S$GLB,,,

## 2023-08-02 PROCEDURE — 1101F PR PT FALLS ASSESS DOC 0-1 FALLS W/OUT INJ PAST YR: ICD-10-PCS | Mod: CPTII,S$GLB,,

## 2023-08-02 PROCEDURE — 3008F PR BODY MASS INDEX (BMI) DOCUMENTED: ICD-10-PCS | Mod: CPTII,S$GLB,,

## 2023-08-02 PROCEDURE — 3074F SYST BP LT 130 MM HG: CPT | Mod: CPTII,S$GLB,,

## 2023-08-02 PROCEDURE — 3288F PR FALLS RISK ASSESSMENT DOCUMENTED: ICD-10-PCS | Mod: CPTII,S$GLB,,

## 2023-08-02 PROCEDURE — 3008F BODY MASS INDEX DOCD: CPT | Mod: CPTII,S$GLB,,

## 2023-08-02 PROCEDURE — 1126F PR PAIN SEVERITY QUANTIFIED, NO PAIN PRESENT: ICD-10-PCS | Mod: CPTII,S$GLB,,

## 2023-08-02 PROCEDURE — 4010F PR ACE/ARB THEARPY RXD/TAKEN: ICD-10-PCS | Mod: CPTII,S$GLB,,

## 2023-08-02 PROCEDURE — 99999 PR PBB SHADOW E&M-EST. PATIENT-LVL V: ICD-10-PCS | Mod: PBBFAC,,,

## 2023-08-02 PROCEDURE — 3074F PR MOST RECENT SYSTOLIC BLOOD PRESSURE < 130 MM HG: ICD-10-PCS | Mod: CPTII,S$GLB,,

## 2023-08-02 PROCEDURE — 4010F ACE/ARB THERAPY RXD/TAKEN: CPT | Mod: CPTII,S$GLB,,

## 2023-08-02 NOTE — PATIENT INSTRUCTIONS
Keep a blood pressure log over the next 4 weeks. Your goal blood pressure is less than 140/90              -check it around the same time each day              -make sure you are resting for 5 minutes prior to checking              -be seated with your legs uncrossed              -I prefer a upper arm cuff instead of a wrist cuff because it tends to be more accurate  Bring your BP log and BP machine to next appointment.   Cut lisinopril in half to 20 mg nightly.   If your BP goes down in the 80s/50s and you are dizzy, please to immediately to ER.  Make sure you are drinking at least 4-16 oz bottles of water per day or more.

## 2023-08-02 NOTE — PROGRESS NOTES
Subjective:         Chief Complaint: Follow-up (1 week med review)  AMADEO Melgoza is a 68 y.o. male, patient of Preethi Willard MD with hypertension, hyperlipidema, PAD, cardiomyopathy, heart failure with preserved EF, BPH, CKD stage 3, type II diabetes, right BKA, known to me, presents today for a  Follow-up (1 week med review)  Here today with all of his meds. Medications reviewed, patient does not have the following meds in his bag:  Finasteride (does not know if he still takes this), folic acid (does not know if he still takes this), lasix (still takes, but only when needed, has not needed to take it), Norco (still takes), linzess (does not know if he still takes this), methotrexate (does not know if he still takes this).     Reports dizziness daily for a couple of months which is now worsening. When he wakes up in the morning, he is feeling well. Takes his medicine. Dizziness occurs in the afternoon, currently does  and body work on vehicles. BP yesterday morning 108/63, was not dizzy. Developed some dizziness yesterday afternoon, checked BP yesterday and it was 84/53; sat down for a while and dizziness went away. Described episodes as a weak feeling, usually last about 10 minutes after sitting down. Denies chest pain or shortness of breath during the episodes. Reports during the spells his glucose levels are normal.     Currently does not have any dizziness.           Past Medical History:   Diagnosis Date    Cancer     colon    Cataract     Chronic kidney disease     Diabetes mellitus     Diabetes mellitus, type 2     Hyperlipidemia     Hypertension        Past Surgical History:   Procedure Laterality Date    AMPUTATION Right     below the knee    CARDIAC CATHETERIZATION      CATARACT EXTRACTION      COLON SURGERY      COLONOSCOPY      COLONOSCOPY N/A 5/22/2018    Procedure: COLONOSCOPY;  Surgeon: Hallie Higuera MD;  Location: [facility];  Service: Endoscopy;  Laterality: N/A;     "COLONOSCOPY N/A 6/12/2018    Procedure: COLONOSCOPY;  Surgeon: Hallie Higuera MD;  Location: [facility];  Service: Endoscopy;  Laterality: N/A;    COLONOSCOPY N/A 6/7/2022    Procedure: COLONOSCOPY;  Surgeon: Hallie Higuera MD;  Location: [facility];  Service: Endoscopy;  Laterality: N/A;    SKIN BIOPSY         Family History   Problem Relation Age of Onset    Diabetes Mother     Hypertension Father     Diabetes Sister     Diabetes Brother     Hypertension Brother     No Known Problems Paternal Grandmother     No Known Problems Paternal Grandfather     No Known Problems Maternal Grandmother     No Known Problems Maternal Grandfather        Social History     Socioeconomic History    Marital status:     Number of children: 1    Years of education: 12   Tobacco Use    Smoking status: Never    Smokeless tobacco: Never   Substance and Sexual Activity    Alcohol use: Yes     Comment: occ    Drug use: No    Sexual activity: Yes     Partners: Female       Review of Systems   Neurological:  Positive for dizziness and weakness.         Objective:     Vitals:    08/02/23 1100 08/02/23 1150   BP: (!) 90/54 (!) 100/58   BP Location: Left arm Left arm   Patient Position: Sitting    Pulse: 71    SpO2: 96%    Weight: 89 kg (196 lb 4.8 oz)    Height: 5' 7" (1.702 m)           Physical Exam  Vitals reviewed.   Constitutional:       Appearance: Normal appearance.   HENT:      Head: Normocephalic and atraumatic.   Eyes:      Extraocular Movements: Extraocular movements intact.   Cardiovascular:      Rate and Rhythm: Normal rate and regular rhythm.      Heart sounds: Normal heart sounds.   Pulmonary:      Effort: Pulmonary effort is normal.      Breath sounds: Normal breath sounds.   Musculoskeletal:      Right lower leg: No edema.      Left lower leg: No edema.      Comments: Right BKA with prosthesis; using cane    Skin:     General: Skin is warm and dry.   Neurological:      General: No focal deficit present.    " "  Mental Status: He is alert and oriented to person, place, and time.   Psychiatric:         Mood and Affect: Mood normal.         Speech: Speech normal.           Laboratory:  CBC:  Recent Labs   Lab Result Units 07/26/23  1418   WBC K/uL 8.47   RBC M/uL 4.77   Hemoglobin g/dL 15.2   Hematocrit % 44.3   Platelets K/uL 225   MCV fL 93   MCH pg 31.9*   MCHC g/dL 34.3     CMP:  Recent Labs   Lab Result Units 07/26/23  1418   Glucose mg/dL 78   Calcium mg/dL 9.4   Albumin g/dL 4.3   Total Protein g/dL 7.5   Sodium mmol/L 143   Potassium mmol/L 4.1   CO2 mmol/L 24   Chloride mmol/L 105   BUN mg/dL 31*   Alkaline Phosphatase U/L 78   ALT U/L 17   AST U/L 27   Total Bilirubin mg/dL 0.8     URINALYSIS:  No results for input(s): "COLORU", "CLARITYU", "SPECGRAV", "PHUR", "PROTEINUA", "GLUCOSEU", "BILIRUBINCON", "BLOODU", "WBCU", "RBCU", "BACTERIA", "MUCUS", "NITRITE", "LEUKOCYTESUR", "UROBILINOGEN", "HYALINECASTS" in the last 2160 hours.   LIPIDS:  Recent Labs   Lab Result Units 07/26/23  1418   HDL mg/dL 22*   Cholesterol mg/dL 91*   Triglycerides mg/dL 81   LDL Cholesterol mg/dL 52.8*   HDL/Cholesterol Ratio % 24.2   Non-HDL Cholesterol mg/dL 69   Total Cholesterol/HDL Ratio  4.1     TSH:  No results for input(s): "TSH" in the last 2160 hours.  A1C:  Recent Labs   Lab Result Units 07/26/23  1418   Hemoglobin A1C % 5.6       Assessment:         ICD-10-CM ICD-9-CM   1. Essential hypertension  I10 401.9   2. Mixed hyperlipidemia  E78.2 272.2   3. Heart failure with preserved ejection fraction, NYHA class II  I50.30 428.9   4. Cardiomyopathy, unspecified type  I42.9 425.4   5. Benign prostatic hyperplasia with lower urinary tract symptoms, symptom details unspecified  N40.1 600.01   6. Type 2 diabetes mellitus with proliferative retinopathy of both eyes, with long-term current use of insulin, macular edema presence unspecified, unspecified proliferative retinopathy type  E11.3593 250.50    Z79.4 362.02     V58.67       Plan: "       Essential hypertension  Patient with c/o dizziness x a few months; yesterday during dizzy spell, BP in 80s/50s. Labs reviewed from 07/26/23, no signs of dehydration. Instructed patient to cut lisinopril in half, take 20 mg daily. Continue to check BP daily and when there is an episode. RTC in 2 weeks with log and BP machine.   Instructed patient to go to ED if he has a BP 80/50s and he is symptomatic.   Encouraged to stay hydrated, drinking at least 4-16 oz bottles of water per day or more and adding powerade/gatorade 0 daily to prevent dehydration and hypotension.     Mixed hyperlipidemia  LDL 52.8. continue statin therapy.     Heart failure with preserved ejection fraction, NYHA class II  Cardiomyopathy, unspecified type  Controled with current regimen. Followed by Cardiology, next scheduled appointment August 24.     Benign prostatic hyperplasia with lower urinary tract symptoms, symptom details unspecified  Controlled with current regimen. Followed by Urology.     Type 2 diabetes mellitus with proliferative retinopathy of both eyes, with long-term current use of insulin, macular edema presence unspecified, unspecified proliferative retinopathy type  Last A1C 5.6. controlled with current regimen. Continue meds as ordered.   Instructed to take glucose daily and when he has dizzy spells/         If symptoms worsen, go to ER.  If symptoms do not improve, return to clinic.   Keep appointments with all specialists.     Patient verbalizes understanding and agrees with current treatment plan.      Follow up in about 2 weeks (around 8/16/2023) for BP.     Patient's Medications   New Prescriptions    No medications on file   Previous Medications    ALBUTEROL (PROVENTIL/VENTOLIN HFA) 90 MCG/ACTUATION INHALER    Inhale 1-2 puffs into the lungs every 6 (six) hours as needed for Wheezing. Rescue    ALFUZOSIN (UROXATRAL) 10 MG TB24    Take 1 tablet (10 mg total) by mouth daily with breakfast.    AMLODIPINE (NORVASC) 5 MG  TABLET    Take 1 tablet (5 mg total) by mouth once daily.    ASPIRIN (ECOTRIN) 81 MG EC TABLET    Take 81 mg by mouth once daily.    ATORVASTATIN (LIPITOR) 80 MG TABLET    Take 1 tablet (80 mg total) by mouth every evening.    BETAMETHASONE VALERATE 0.1% (VALISONE) 0.1 % CREA    Apply topically once daily.    BLOOD SUGAR DIAGNOSTIC STRP    Test blood sugar 3 times daily    BLOOD-GLUCOSE METER KIT    One touch verio kit    BLOOD-GLUCOSE METER,CONTINUOUS (DEXCOM G6 ) MISC    1 Device by Misc.(Non-Drug; Combo Route) route once. Dispense 1 device for 1 dose    BLOOD-GLUCOSE SENSOR (DEXCOM G6 SENSOR) CELIA    1 each by Misc.(Non-Drug; Combo Route) route every 10 days. Change once every 10 days. Disp 3pk/month.    BLOOD-GLUCOSE TRANSMITTER (DEXCOM G6 TRANSMITTER) CELIA    1 Device by Misc.(Non-Drug; Combo Route) route every 3 (three) months. Change transmitter every 90 days    CARVEDILOL (COREG) 25 MG TABLET    TAKE 1 TABLET BY MOUTH TWICE DAILY WITH MEALS    CLOTRIMAZOLE (LOTRIMIN) 1 % CREAM    Apply 1 application topically 2 (two) times daily.    EMPAGLIFLOZIN (JARDIANCE) 25 MG TABLET    Take 1 tablet (25 mg total) by mouth once daily.    FINASTERIDE (PROSCAR) 5 MG TABLET    Take 1 tablet (5 mg total) by mouth once daily.    FLUOCINOLONE (SYNALAR) 0.01 % EXTERNAL SOLUTION    APPLY  SOLUTION TOPICALLY TO SCALP ONCE DAILY IN THE EVENING    FOLIC ACID (FOLVITE) 1 MG TABLET    Take 1 tablet (1 mg total) by mouth once daily.    FUROSEMIDE (LASIX) 40 MG TABLET    TAKE ONE TABLET BY MOUTH ONCE DAILY (UP  TO  4  TABLETS  A  DAY  EXTRA,  ONLY  AS  NEEDED  FOR  SWELLING)    GLUCAGON 3 MG/ACTUATION SPRY    1 each by Nasal route as needed (use for severe low blood sugar).    GLUCOSE 4 GM CHEWABLE TABLET    Take 4 tablets (16 g total) by mouth as needed for Low blood sugar.    HYDROCODONE-ACETAMINOPHEN (NORCO) 7.5-325 MG PER TABLET    Take 1 tablet by mouth every 6 (six) hours as needed for Pain.    INSULIN (LANTUS SOLOSTAR  "U-100 INSULIN) GLARGINE 100 UNITS/ML SUBQ PEN    Inject 55 Units into the skin every evening.    INSULIN LISPRO (HUMALOG KWIKPEN INSULIN) 100 UNIT/ML PEN    Inject 20 Units into the skin 3 (three) times daily with meals.    KETOCONAZOLE (NIZORAL) 2 % CREAM    APPLY A THIN LAYER OF  CREAM TOPICALLY TO AFFECTED AREA (FACE) TWICE DAILY    KETOCONAZOLE (NIZORAL) 2 % SHAMPOO    USE SHAMPOO TOPICALLY ON SCALP TWICE A WEEK    LANCETS MISC    1 lancet by Misc.(Non-Drug; Combo Route) route 3 (three) times daily before meals.    LINACLOTIDE (LINZESS) 145 MCG CAP CAPSULE    Take 1 capsule (145 mcg total) by mouth once daily.    LISINOPRIL (PRINIVIL,ZESTRIL) 40 MG TABLET    TAKE 1 TABLET BY MOUTH ONCE DAILY IN THE EVENING    METHOTREXATE 2.5 MG TAB    Take 4 tablets (10 mg total) by mouth every 7 days.    PEN NEEDLE, DIABETIC (BD JERRY 2ND GEN PEN NEEDLE) 32 GAUGE X 5/32" NDLE    Inject 1 each into the skin 4 (four) times daily with meals and nightly.    POLYETHYLENE GLYCOL (GLYCOLAX) 17 GRAM/DOSE POWDER    Take 17 g by mouth once daily.    POLYETHYLENE GLYCOL 3350 (MIRALAX ORAL)    Take by mouth.    SHINGRIX, PF, 50 MCG/0.5 ML INJECTION        SPIRONOLACTONE (ALDACTONE) 25 MG TABLET    Take 1/2 tablet every day    SULFACETAMIDE SODIUM 10% (BLEPH-10) 10 % OPHTHALMIC SOLUTION    Place 2 drops into the right eye 4 (four) times daily.    TRIAMCINOLONE ACETONIDE 0.1% (KENALOG) 0.1 % CREAM    Mix in a 50:50 portion with your Ketoconazole cream and apply nightly to the affected areas.    TRILIPIX 135 MG CPDR    TAKE 1 CAPSULE BY MOUTH EVERY DAY    TRUEPLUS LANCETS 33 GAUGE MISC    Apply topically 3 (three) times daily.    VARDENAFIL (LEVITRA) 20 MG TABLET    TAKE 1 TABLET (20 MG TOTAL) BY MOUTH DAILY AS NEEDED FOR ERECTILE DYSFUNCTION.   Modified Medications    No medications on file   Discontinued Medications    No medications on file         Coni Brock NP    "

## 2023-08-09 ENCOUNTER — TELEPHONE (OUTPATIENT)
Dept: FAMILY MEDICINE | Facility: CLINIC | Age: 68
End: 2023-08-09
Payer: MEDICARE

## 2023-08-09 NOTE — TELEPHONE ENCOUNTER
Patient stated his blood pressure been running average top number 100-120 and bottom numbers in the 60s. He hasn't been feeling dizzy since cutting the lisinopril in half.

## 2023-08-15 NOTE — PROGRESS NOTES
Subjective:         Chief Complaint: Follow-up (2 week) and Hypertension  HPI   Brando Melgoza is a 68 y.o. male, patient of Preethi Willard MD with hypertension, hyperlipidema, PAD, cardiomyopathy, heart failure with preserved EF, BPH, CKD stage 3, type II diabetes, right BKA, known to me, presents today for a Follow-up (2 week) and Hypertension    Reports dizziness is better since we cut the lisinopril in half to 10 mg; however, still having dizzy spells. 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 Reports BP has been running low 100s/60s-1 teens/60s after taking the pill, had one low in 80s/50s.   BP taken manually today 102/58, with patients BP machine 103/62.       Past Medical History:   Diagnosis Date    Cancer     colon    Cataract     Chronic kidney disease     Diabetes mellitus     Diabetes mellitus, type 2     Hyperlipidemia     Hypertension        Past Surgical History:   Procedure Laterality Date    AMPUTATION Right     below the knee    CARDIAC CATHETERIZATION      CATARACT EXTRACTION      COLON SURGERY      COLONOSCOPY      COLONOSCOPY N/A 5/22/2018    Procedure: COLONOSCOPY;  Surgeon: Hallie Higuera MD;  Location: [facility];  Service: Endoscopy;  Laterality: N/A;    COLONOSCOPY N/A 6/12/2018    Procedure: COLONOSCOPY;  Surgeon: Hallie Higuera MD;  Location: [facility] ENDO;  Service: Endoscopy;  Laterality: N/A;    COLONOSCOPY N/A 6/7/2022    Procedure: COLONOSCOPY;  Surgeon: Hallie Higuera MD;  Location: [facility];  Service: Endoscopy;  Laterality: N/A;    SKIN BIOPSY         Family History   Problem Relation Age of Onset    Diabetes Mother     Hypertension Father     Diabetes Sister     Diabetes Brother     Hypertension Brother     No Known Problems Paternal Grandmother     No Known Problems Paternal Grandfather     No Known Problems Maternal Grandmother     No Known Problems Maternal Grandfather        Social History     Socioeconomic History    Marital status:     Number of children: 1    Years  "of education: 12   Tobacco Use    Smoking status: Never    Smokeless tobacco: Never   Substance and Sexual Activity    Alcohol use: Yes     Comment: occ    Drug use: No    Sexual activity: Yes     Partners: Female       Review of Systems   Respiratory:  Negative for cough and shortness of breath.    Cardiovascular:  Negative for chest pain.   Neurological:  Positive for dizziness.         Objective:     Vitals:    08/16/23 0809   BP: (!) 106/58   BP Location: Left arm   Patient Position: Sitting   Pulse: 62   SpO2: 99%   Weight: 87 kg (191 lb 14.4 oz)   Height: 5' 7" (1.702 m)          Physical Exam  Constitutional:       Appearance: Normal appearance.   HENT:      Head: Normocephalic and atraumatic.   Eyes:      Extraocular Movements: Extraocular movements intact.   Cardiovascular:      Rate and Rhythm: Normal rate and regular rhythm.      Heart sounds: Normal heart sounds.   Pulmonary:      Effort: Pulmonary effort is normal.      Breath sounds: Normal breath sounds.   Musculoskeletal:      Right lower leg: No edema.      Left lower leg: No edema.      Comments: R BKA with prosthesis,  using cane.    Skin:     General: Skin is warm and dry.   Neurological:      General: No focal deficit present.      Mental Status: He is alert and oriented to person, place, and time.   Psychiatric:         Mood and Affect: Mood normal.         Speech: Speech normal.           Laboratory:  CBC:  Recent Labs   Lab Result Units 07/26/23  1418   WBC K/uL 8.47   RBC M/uL 4.77   Hemoglobin g/dL 15.2   Hematocrit % 44.3   Platelets K/uL 225   MCV fL 93   MCH pg 31.9*   MCHC g/dL 34.3     CMP:  Recent Labs   Lab Result Units 07/26/23  1418   Glucose mg/dL 78   Calcium mg/dL 9.4   Albumin g/dL 4.3   Total Protein g/dL 7.5   Sodium mmol/L 143   Potassium mmol/L 4.1   CO2 mmol/L 24   Chloride mmol/L 105   BUN mg/dL 31*   Alkaline Phosphatase U/L 78   ALT U/L 17   AST U/L 27   Total Bilirubin mg/dL 0.8     URINALYSIS:  No results for " "input(s): "COLORU", "CLARITYU", "SPECGRAV", "PHUR", "PROTEINUA", "GLUCOSEU", "BILIRUBINCON", "BLOODU", "WBCU", "RBCU", "BACTERIA", "MUCUS", "NITRITE", "LEUKOCYTESUR", "UROBILINOGEN", "HYALINECASTS" in the last 2160 hours.   LIPIDS:  Recent Labs   Lab Result Units 07/26/23  1418   HDL mg/dL 22*   Cholesterol mg/dL 91*   Triglycerides mg/dL 81   LDL Cholesterol mg/dL 52.8*   HDL/Cholesterol Ratio % 24.2   Non-HDL Cholesterol mg/dL 69   Total Cholesterol/HDL Ratio  4.1     TSH:  No results for input(s): "TSH" in the last 2160 hours.  A1C:  Recent Labs   Lab Result Units 07/26/23  1418   Hemoglobin A1C % 5.6       Assessment:         ICD-10-CM ICD-9-CM   1. Essential hypertension  I10 401.9   2. Mixed hyperlipidemia  E78.2 272.2   3. Heart failure with preserved ejection fraction, NYHA class II  I50.30 428.9   4. Stage 3a chronic kidney disease  N18.31 585.3   5. Type 2 diabetes mellitus with proliferative retinopathy of both eyes, with long-term current use of insulin, macular edema presence unspecified, unspecified proliferative retinopathy type  E11.3593 250.50    Z79.4 362.02     V58.67   6. Class 1 obesity due to excess calories with serious comorbidity and body mass index (BMI) of 30.0 to 30.9 in adult  E66.09 278.00    Z68.30 V85.30   7. Status post below-knee amputation of right lower extremity  Z89.511 V49.75       Plan:       Essential hypertension  -     lisinopriL 10 MG tablet; Take 1 tablet (10 mg total) by mouth once daily.  Dispense: 90 tablet; Refill: 3  Dizziness and BP has improved since decreasing dose of Lisinopril. Continue with lisinopril at 10 mg, amlodipine at 5 mg. Continue to check BP twice per day. Bring log to appointment with Cardiology. 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   Low Na diet  Keep appointment with Cardiology 08/24/23.     Mixed hyperlipidemia  Continue statin therapy.     Heart failure with preserved ejection fraction, NYHA class " II  Stable with medications. Followed by Cardiology.     Stage 3a chronic kidney disease  Stable. Continue to avoid nephrotoxic drugs.     Type 2 diabetes mellitus with proliferative retinopathy of both eyes, with long-term current use of insulin, macular edema presence unspecified, unspecified proliferative retinopathy type  Controlled, last A1C 5.6. continue medications as prescribed.   Followed by Endocrinology, next appointment 12/4/2023. Encouraged to keep appointment.     Class 1 obesity due to excess calories with serious comorbidity and body mass index (BMI) of 30.0 to 30.9 in adult  Work on diet modification and increase in physical activity as tolerated.     Status post below-knee amputation of right lower extremity  Continue using prosthesis and cane.     Keep appointment with Cardiology.     If symptoms worsen, go to ER.  If symptoms do not improve, return to clinic.   Keep appointments with all specialists.     Patient verbalizes understanding and agrees with current treatment plan.      Follow up in about 3 months (around 11/16/2023), or if symptoms worsen or fail to improve.     Patient's Medications   New Prescriptions    LISINOPRIL 10 MG TABLET    Take 1 tablet (10 mg total) by mouth once daily.   Previous Medications    ALCOHOL SWABS PADM    Apply 1 each topically as needed (use to help with adhesiveness of Dexcom cgm). IV prep    ALFUZOSIN (UROXATRAL) 10 MG TB24    Take 1 tablet (10 mg total) by mouth daily with breakfast.    AMLODIPINE (NORVASC) 5 MG TABLET    Take 1 tablet (5 mg total) by mouth once daily.    ASPIRIN (ECOTRIN) 81 MG EC TABLET    Take 81 mg by mouth once daily.    ATORVASTATIN (LIPITOR) 80 MG TABLET    Take 1 tablet (80 mg total) by mouth every evening.    BETAMETHASONE VALERATE 0.1% (VALISONE) 0.1 % CREA    Apply topically once daily.    BLOOD SUGAR DIAGNOSTIC STRP    Test blood sugar 3 times daily    BLOOD-GLUCOSE METER KIT    One touch verio kit    BLOOD-GLUCOSE  "METER,CONTINUOUS (DEXCOM G6 ) MISC    1 Device by Misc.(Non-Drug; Combo Route) route once. Dispense 1 device for 1 dose    BLOOD-GLUCOSE SENSOR (DEXCOM G6 SENSOR) CELIA    1 each by Misc.(Non-Drug; Combo Route) route every 10 days. Change once every 10 days. Disp 3pk/month.    BLOOD-GLUCOSE TRANSMITTER (DEXCOM G6 TRANSMITTER) CELIA    1 Device by Misc.(Non-Drug; Combo Route) route every 3 (three) months. Change transmitter every 90 days    CARVEDILOL (COREG) 25 MG TABLET    TAKE 1 TABLET BY MOUTH TWICE DAILY WITH MEALS    CLOTRIMAZOLE (LOTRIMIN) 1 % CREAM    Apply 1 application topically 2 (two) times daily.    EMPAGLIFLOZIN (JARDIANCE) 25 MG TABLET    Take 1 tablet (25 mg total) by mouth once daily.    FLUOCINOLONE (SYNALAR) 0.01 % EXTERNAL SOLUTION    APPLY  SOLUTION TOPICALLY TO SCALP ONCE DAILY IN THE EVENING    GLUCAGON 3 MG/ACTUATION SPRY    1 each by Nasal route as needed (use for severe low blood sugar).    GLUCOSE 4 GM CHEWABLE TABLET    Take 4 tablets (16 g total) by mouth as needed for Low blood sugar.    HYDROCODONE-ACETAMINOPHEN (NORCO) 7.5-325 MG PER TABLET    Take 1 tablet by mouth every 6 (six) hours as needed for Pain.    INSULIN (LANTUS SOLOSTAR U-100 INSULIN) GLARGINE 100 UNITS/ML SUBQ PEN    Inject 40 Units into the skin every evening.    INSULIN LISPRO (HUMALOG KWIKPEN INSULIN) 100 UNIT/ML PEN    Inject 20 Units into the skin 3 (three) times daily with meals.    KETOCONAZOLE (NIZORAL) 2 % CREAM    APPLY A THIN LAYER OF  CREAM TOPICALLY TO AFFECTED AREA (FACE) TWICE DAILY    KETOCONAZOLE (NIZORAL) 2 % SHAMPOO    USE SHAMPOO TOPICALLY ON SCALP TWICE A WEEK    LANCETS MISC    1 lancet by Misc.(Non-Drug; Combo Route) route 3 (three) times daily before meals.    METHOTREXATE 2.5 MG TAB    Take 4 tablets (10 mg total) by mouth every 7 days.    MUPIROCIN (BACTROBAN) 2 % OINTMENT    Apply topically 3 (three) times daily.    PEN NEEDLE, DIABETIC (BD JERRY 2ND GEN PEN NEEDLE) 32 GAUGE X 5/32" NDLE    " Inject 1 each into the skin 4 (four) times daily with meals and nightly.    POLYETHYLENE GLYCOL 3350 (MIRALAX ORAL)    Take by mouth.    SHINGRIX, PF, 50 MCG/0.5 ML INJECTION        SPIRONOLACTONE (ALDACTONE) 25 MG TABLET    Take 1/2 tablet every day    TRIAMCINOLONE ACETONIDE 0.1% (KENALOG) 0.1 % CREAM    Mix in a 50:50 portion with your Ketoconazole cream and apply nightly to the affected areas.    TRILIPIX 135 MG CPDR    TAKE 1 CAPSULE BY MOUTH EVERY DAY    TRUEPLUS LANCETS 33 GAUGE MISC    Apply topically 3 (three) times daily.    VARDENAFIL (LEVITRA) 20 MG TABLET    TAKE 1 TABLET (20 MG TOTAL) BY MOUTH DAILY AS NEEDED FOR ERECTILE DYSFUNCTION.   Modified Medications    No medications on file   Discontinued Medications    LISINOPRIL (PRINIVIL,ZESTRIL) 20 MG TABLET    Take 1 tablet (20 mg total) by mouth once daily.         Coni Brock NP

## 2023-08-16 ENCOUNTER — OFFICE VISIT (OUTPATIENT)
Dept: FAMILY MEDICINE | Facility: CLINIC | Age: 68
End: 2023-08-16
Payer: MEDICARE

## 2023-08-16 VITALS
SYSTOLIC BLOOD PRESSURE: 106 MMHG | OXYGEN SATURATION: 99 % | WEIGHT: 191.88 LBS | BODY MASS INDEX: 30.12 KG/M2 | HEIGHT: 67 IN | HEART RATE: 62 BPM | DIASTOLIC BLOOD PRESSURE: 58 MMHG

## 2023-08-16 DIAGNOSIS — E66.09 CLASS 1 OBESITY DUE TO EXCESS CALORIES WITH SERIOUS COMORBIDITY AND BODY MASS INDEX (BMI) OF 30.0 TO 30.9 IN ADULT: ICD-10-CM

## 2023-08-16 DIAGNOSIS — Z79.4 TYPE 2 DIABETES MELLITUS WITH PROLIFERATIVE RETINOPATHY OF BOTH EYES, WITH LONG-TERM CURRENT USE OF INSULIN, MACULAR EDEMA PRESENCE UNSPECIFIED, UNSPECIFIED PROLIFERATIVE RETINOPATHY TYPE: Chronic | ICD-10-CM

## 2023-08-16 DIAGNOSIS — E11.3593 TYPE 2 DIABETES MELLITUS WITH PROLIFERATIVE RETINOPATHY OF BOTH EYES, WITH LONG-TERM CURRENT USE OF INSULIN, MACULAR EDEMA PRESENCE UNSPECIFIED, UNSPECIFIED PROLIFERATIVE RETINOPATHY TYPE: Chronic | ICD-10-CM

## 2023-08-16 DIAGNOSIS — I10 ESSENTIAL HYPERTENSION: Primary | Chronic | ICD-10-CM

## 2023-08-16 DIAGNOSIS — Z89.511 STATUS POST BELOW-KNEE AMPUTATION OF RIGHT LOWER EXTREMITY: ICD-10-CM

## 2023-08-16 DIAGNOSIS — E78.2 MIXED HYPERLIPIDEMIA: Chronic | ICD-10-CM

## 2023-08-16 DIAGNOSIS — N18.31 STAGE 3A CHRONIC KIDNEY DISEASE: ICD-10-CM

## 2023-08-16 DIAGNOSIS — I50.30 HEART FAILURE WITH PRESERVED EJECTION FRACTION, NYHA CLASS II: ICD-10-CM

## 2023-08-16 PROCEDURE — 3044F HG A1C LEVEL LT 7.0%: CPT | Mod: CPTII,S$GLB,,

## 2023-08-16 PROCEDURE — 3044F PR MOST RECENT HEMOGLOBIN A1C LEVEL <7.0%: ICD-10-PCS | Mod: CPTII,S$GLB,,

## 2023-08-16 PROCEDURE — 1126F PR PAIN SEVERITY QUANTIFIED, NO PAIN PRESENT: ICD-10-PCS | Mod: CPTII,S$GLB,,

## 2023-08-16 PROCEDURE — 1159F MED LIST DOCD IN RCRD: CPT | Mod: CPTII,S$GLB,,

## 2023-08-16 PROCEDURE — 1160F RVW MEDS BY RX/DR IN RCRD: CPT | Mod: CPTII,S$GLB,,

## 2023-08-16 PROCEDURE — 99999 PR PBB SHADOW E&M-EST. PATIENT-LVL V: CPT | Mod: PBBFAC,,,

## 2023-08-16 PROCEDURE — 99214 PR OFFICE/OUTPT VISIT, EST, LEVL IV, 30-39 MIN: ICD-10-PCS | Mod: S$GLB,,,

## 2023-08-16 PROCEDURE — 1126F AMNT PAIN NOTED NONE PRSNT: CPT | Mod: CPTII,S$GLB,,

## 2023-08-16 PROCEDURE — 1101F PR PT FALLS ASSESS DOC 0-1 FALLS W/OUT INJ PAST YR: ICD-10-PCS | Mod: CPTII,S$GLB,,

## 2023-08-16 PROCEDURE — 3074F PR MOST RECENT SYSTOLIC BLOOD PRESSURE < 130 MM HG: ICD-10-PCS | Mod: CPTII,S$GLB,,

## 2023-08-16 PROCEDURE — 3074F SYST BP LT 130 MM HG: CPT | Mod: CPTII,S$GLB,,

## 2023-08-16 PROCEDURE — 99214 OFFICE O/P EST MOD 30 MIN: CPT | Mod: S$GLB,,,

## 2023-08-16 PROCEDURE — 1101F PT FALLS ASSESS-DOCD LE1/YR: CPT | Mod: CPTII,S$GLB,,

## 2023-08-16 PROCEDURE — 1159F PR MEDICATION LIST DOCUMENTED IN MEDICAL RECORD: ICD-10-PCS | Mod: CPTII,S$GLB,,

## 2023-08-16 PROCEDURE — 99999 PR PBB SHADOW E&M-EST. PATIENT-LVL V: ICD-10-PCS | Mod: PBBFAC,,,

## 2023-08-16 PROCEDURE — 3288F FALL RISK ASSESSMENT DOCD: CPT | Mod: CPTII,S$GLB,,

## 2023-08-16 PROCEDURE — 4010F ACE/ARB THERAPY RXD/TAKEN: CPT | Mod: CPTII,S$GLB,,

## 2023-08-16 PROCEDURE — 3008F BODY MASS INDEX DOCD: CPT | Mod: CPTII,S$GLB,,

## 2023-08-16 PROCEDURE — 3078F PR MOST RECENT DIASTOLIC BLOOD PRESSURE < 80 MM HG: ICD-10-PCS | Mod: CPTII,S$GLB,,

## 2023-08-16 PROCEDURE — 3078F DIAST BP <80 MM HG: CPT | Mod: CPTII,S$GLB,,

## 2023-08-16 PROCEDURE — 3288F PR FALLS RISK ASSESSMENT DOCUMENTED: ICD-10-PCS | Mod: CPTII,S$GLB,,

## 2023-08-16 PROCEDURE — 3008F PR BODY MASS INDEX (BMI) DOCUMENTED: ICD-10-PCS | Mod: CPTII,S$GLB,,

## 2023-08-16 PROCEDURE — 1160F PR REVIEW ALL MEDS BY PRESCRIBER/CLIN PHARMACIST DOCUMENTED: ICD-10-PCS | Mod: CPTII,S$GLB,,

## 2023-08-16 PROCEDURE — 4010F PR ACE/ARB THEARPY RXD/TAKEN: ICD-10-PCS | Mod: CPTII,S$GLB,,

## 2023-08-16 RX ORDER — LISINOPRIL 10 MG/1
10 TABLET ORAL DAILY
Qty: 90 TABLET | Refills: 3 | Status: SHIPPED | OUTPATIENT
Start: 2023-08-16 | End: 2023-08-24 | Stop reason: SDUPTHER

## 2023-08-16 NOTE — PATIENT INSTRUCTIONS
Continue to check BP daily. Bring readings with you to your appointment with cardiology.   Add gatorade 0 when working in heat please!  Stay hydrated, at least 4-16 oz bottles of water per day or more.

## 2023-08-24 PROBLEM — Z91.89 MULTIPLE RISK FACTORS FOR CORONARY ARTERY DISEASE: Status: ACTIVE | Noted: 2023-08-24

## 2023-09-18 DIAGNOSIS — U07.1 COVID-19 VIRUS DETECTED: ICD-10-CM

## 2023-09-21 NOTE — LETTER
September 21, 2023    Brando Melgoza  [address] [place] [id number]             [facility]  [address]  [address]  [place] [address]  Phone: [phone]  Fax: [phone] Dear Mr. Melgoza:     You are receiving this letter because per our records you have not had your yearly diabetes eye exam within the last 12 months. If you have had a diabetes eye exam within the last 12 months, please send us information on where this was done so we can obtain your records.      If you have not had this done or need a referral, let me know. If your regular eye doctor is not available you can check with:     [facility]  [place] OFF[facility]  [address] [place] [address] [phone]     Eye health is an important part of your diabetes care. Screening and monitoring for diabetes-related eye disease can improve long term outcomes and protect your eyesight     Let me know if you have any questions/concerns.      Sincerely,      Dr Willard

## 2023-09-21 NOTE — PROGRESS NOTES
PATIENT VISIT FAMILY MEDICINE    CC: No chief complaint on file.      HPI: Brando Melgoza  is a 68 y.o. male:    Patient is {ayintro:50633} Patient presents {ayintro2:01702} for {ayintro3:42506}        PMHX:   Past Medical History:   Diagnosis Date    Cancer     colon    Cataract     Chronic kidney disease     Diabetes mellitus     Diabetes mellitus, type 2     Hyperlipidemia     Hypertension        PSHX:   Past Surgical History:   Procedure Laterality Date    AMPUTATION Right     below the knee    CARDIAC CATHETERIZATION      CATARACT EXTRACTION      COLON SURGERY      COLONOSCOPY      COLONOSCOPY N/A 5/22/2018    Procedure: COLONOSCOPY;  Surgeon: Hallie Higuera MD;  Location: [facility];  Service: Endoscopy;  Laterality: N/A;    COLONOSCOPY N/A 6/12/2018    Procedure: COLONOSCOPY;  Surgeon: Hallie Higuera MD;  Location: [facility];  Service: Endoscopy;  Laterality: N/A;    COLONOSCOPY N/A 6/7/2022    Procedure: COLONOSCOPY;  Surgeon: Hallie Higuera MD;  Location: [facility];  Service: Endoscopy;  Laterality: N/A;    SKIN BIOPSY         FAMHX:   Family History   Problem Relation Age of Onset    Diabetes Mother     Hypertension Father     Diabetes Sister     Diabetes Brother     Hypertension Brother     No Known Problems Paternal Grandmother     No Known Problems Paternal Grandfather     No Known Problems Maternal Grandmother     No Known Problems Maternal Grandfather        SOCHX:   Social History     Socioeconomic History    Marital status:     Number of children: 1    Years of education: 12   Tobacco Use    Smoking status: Never    Smokeless tobacco: Never   Substance and Sexual Activity    Alcohol use: Not Currently     Comment: occ    Drug use: No    Sexual activity: Yes     Partners: Female       ALLERGIES: Review of patient's allergies indicates:  No Known Allergies    MEDS:   Current Outpatient Medications:     alcohol swabs PadM, Apply 1 each topically as needed (use to help  with adhesiveness of Dexcom cgm). IV prep, Disp: 50 each, Rfl: 11    alfuzosin (UROXATRAL) 10 mg Tb24, Take 1 tablet (10 mg total) by mouth daily with breakfast., Disp: 90 tablet, Rfl: 3    aspirin (ECOTRIN) 81 MG EC tablet, Take 81 mg by mouth once daily., Disp: , Rfl:     atorvastatin (LIPITOR) 80 MG tablet, Take 1 tablet (80 mg total) by mouth every evening., Disp: 90 tablet, Rfl: 3    benzonatate (TESSALON) 100 MG capsule, Take 2 capsules (200 mg total) by mouth 3 (three) times daily as needed for Cough., Disp: 15 capsule, Rfl: 0    betamethasone valerate 0.1% (VALISONE) 0.1 % Crea, Apply topically once daily., Disp: 45 g, Rfl: 2    blood sugar diagnostic Strp, Test blood sugar 3 times daily, Disp: 300 strip, Rfl: 3    blood-glucose meter kit, One touch verio kit, Disp: 1 each, Rfl: 0    blood-glucose meter,continuous (DEXCOM G6 ) Misc, 1 Device by Misc.(Non-Drug; Combo Route) route once. Dispense 1 device for 1 dose, Disp: 1 each, Rfl: 0    blood-glucose sensor (DEXCOM G6 SENSOR) Lynnette, 1 each by Misc.(Non-Drug; Combo Route) route every 10 days. Change once every 10 days. Disp 3pk/month., Disp: 9 each, Rfl: 3    blood-glucose transmitter (DEXCOM G6 TRANSMITTER) Lynnette, 1 Device by Misc.(Non-Drug; Combo Route) route every 3 (three) months. Change transmitter every 90 days, Disp: 1 each, Rfl: 3    carvediloL (COREG) 25 MG tablet, Take 1 tablet (25 mg total) by mouth 2 (two) times daily with meals., Disp: 180 tablet, Rfl: 3    clotrimazole (LOTRIMIN) 1 % cream, Apply 1 application topically 2 (two) times daily., Disp: , Rfl:     empagliflozin (JARDIANCE) 25 mg tablet, Take 1 tablet (25 mg total) by mouth once daily., Disp: 90 tablet, Rfl: 1    fenofibric acid (TRILIPIX) 135 mg CpDR, Take 1 capsule (135 mg total) by mouth once daily., Disp: 90 capsule, Rfl: 3    fluocinolone (SYNALAR) 0.01 % external solution, APPLY  SOLUTION TOPICALLY TO SCALP ONCE DAILY IN THE EVENING, Disp: 60 mL, Rfl: 1    glucagon 3  "mg/actuation Spry, 1 each by Nasal route as needed (use for severe low blood sugar)., Disp: 1 each, Rfl: 11    glucose 4 GM chewable tablet, Take 4 tablets (16 g total) by mouth as needed for Low blood sugar., Disp: 30 tablet, Rfl: 12    HYDROcodone-acetaminophen (NORCO) 7.5-325 mg per tablet, Take 1 tablet by mouth every 6 (six) hours as needed for Pain., Disp: 28 tablet, Rfl: 0    insulin (LANTUS SOLOSTAR U-100 INSULIN) glargine 100 units/mL SubQ pen, Inject 40 Units into the skin every evening., Disp: 36 mL, Rfl: 1    insulin lispro (HUMALOG KWIKPEN INSULIN) 100 unit/mL pen, Inject 20 Units into the skin 3 (three) times daily with meals., Disp: 18 mL, Rfl: 11    ketoconazole (NIZORAL) 2 % cream, APPLY A THIN LAYER OF  CREAM TOPICALLY TO AFFECTED AREA (FACE) TWICE DAILY, Disp: 120 g, Rfl: 3    ketoconazole (NIZORAL) 2 % shampoo, USE SHAMPOO TOPICALLY ON SCALP TWICE A WEEK, Disp: 120 mL, Rfl: 5    lancets Misc, 1 lancet by Misc.(Non-Drug; Combo Route) route 3 (three) times daily before meals., Disp: 300 each, Rfl: 3    lisinopriL 10 MG tablet, Take 1 tablet (10 mg total) by mouth nightly., Disp: 90 tablet, Rfl: 3    methotrexate 2.5 MG Tab, Take 4 tablets (10 mg total) by mouth every 7 days. (Patient not taking: Reported on 8/2/2023), Disp: 16 tablet, Rfl: 3    mupirocin (BACTROBAN) 2 % ointment, Apply topically 3 (three) times daily., Disp: 15 g, Rfl: 3    ondansetron (ZOFRAN) 8 MG tablet, Take 1 tablet (8 mg total) by mouth every 12 (twelve) hours as needed for Nausea., Disp: 8 tablet, Rfl: 1    pen needle, diabetic (BD JERRY 2ND GEN PEN NEEDLE) 32 gauge x 5/32" Ndle, Inject 1 each into the skin 4 (four) times daily with meals and nightly., Disp: 360 each, Rfl: 3    POLYETHYLENE GLYCOL 3350 (MIRALAX ORAL), Take by mouth., Disp: , Rfl:     SHINGRIX, PF, 50 mcg/0.5 mL injection, , Disp: , Rfl:     spironolactone (ALDACTONE) 25 MG tablet, Take 0.5 tablets (12.5 mg total) by mouth once daily. Take 1/2 tablet every " day, Disp: 90 tablet, Rfl: 3    triamcinolone acetonide 0.1% (KENALOG) 0.1 % cream, Mix in a 50:50 portion with your Ketoconazole cream and apply nightly to the affected areas., Disp: 45 g, Rfl: 1    TRUEPLUS LANCETS 33 gauge Misc, Apply topically 3 (three) times daily., Disp: , Rfl:     vardenafiL (LEVITRA) 20 MG tablet, TAKE 1 TABLET (20 MG TOTAL) BY MOUTH DAILY AS NEEDED FOR ERECTILE DYSFUNCTION., Disp: 15 tablet, Rfl: 7    OBJECTIVE:   There were no vitals filed for this visit.  There is no height or weight on file to calculate BMI.      Physical Exam      LABS:   A1C:  Recent Labs   Lab 07/26/23  1418   Hemoglobin A1C 5.6     CBC:  Recent Labs   Lab 07/26/23  1418   WBC 8.47   RBC 4.77   Hemoglobin 15.2   Hematocrit 44.3   Platelets 225   MCV 93   MCH 31.9 H   MCHC 34.3     CMP:  Recent Labs   Lab 07/26/23  1418   Glucose 78   Calcium 9.4   Albumin 4.3   Total Protein 7.5   Sodium 143   Potassium 4.1   CO2 24   Chloride 105   BUN 31 H   Creatinine 1.30   Alkaline Phosphatase 78   ALT 17   AST 27   Total Bilirubin 0.8     LIPIDS:  Recent Labs   Lab 07/26/23  1418   HDL 22 L   Cholesterol 91 L   Triglycerides 81   LDL Cholesterol 52.8 L   HDL/Cholesterol Ratio 24.2   Non-HDL Cholesterol 69   Total Cholesterol/HDL Ratio 4.1     TSH:          ASSESSMENT & PLAN:    Problem List Items Addressed This Visit    None        No follow-ups on file.      RTC/ED precautions discussed where applicable.   Encouraged patient to let me know if there are any further questions/concerns.     Advise patient/caretaker to check with insurance regarding orders to avoid unexpected fees/costs.     The patient/caretaker indicates understanding of these issues and agrees with the plan.    Dr. Preethi Willard MD  Family Medicine

## 2023-09-27 ENCOUNTER — NURSE TRIAGE (OUTPATIENT)
Dept: ADMINISTRATIVE | Facility: CLINIC | Age: 68
End: 2023-09-27
Payer: MEDICARE

## 2023-09-27 NOTE — TELEPHONE ENCOUNTER
[place]    PCP:  Dr. Preethi Willard    Pt responded to Covid [facility] message for new or worsening symptoms.  States fever (101.9), CP, mild SOB, and productive cough with yellowish to white phlegm.  Per protocol, care advised is go to the ED now.  Pt VU.  Instructed to call OOC with worsening of symptoms and/or questions/concerns.  Verbalizes understanding.     Reason for Disposition   SEVERE or constant chest pain or pressure  (Exception: Mild central chest pain, present only when coughing.)    Additional Information   Negative: SEVERE difficulty breathing (e.g., struggling for each breath, speaks in single words)   Negative: Difficult to awaken or acting confused (e.g., disoriented, slurred speech)   Negative: Bluish (or gray) lips or face now   Negative: Shock suspected (e.g., cold/pale/clammy skin, too weak to stand, low BP, rapid pulse)   Negative: Sounds like a life-threatening emergency to the triager    Protocols used: Coronavirus (COVID-19) Diagnosed or [id number]

## 2023-11-14 NOTE — PROGRESS NOTES
Subjective:         Chief Complaint: Follow-up  HPI   Brando Melgoza is a 68 y.o. male, patient of Preethi Willard MD with hypertension, hyperlipidema, PAD, cardiomyopathy, heart failure with preserved EF, BPH, CKD stage 3, type II diabetes, right BKA, known to me, presents today for a 3 month Follow-up    3 month f/u on chronic conditions. No complaints today.   Since our last visit he was treated for COVID and pneumonia 09/27/2023. Reports symptoms have resolved.     Cardiology: Dr. William Tineo, last seen 08/24; BP was 88/62, amlodipine was discontinued.  Checks BP: twice per day   BP ranges between:100/60s    Endocrinology: Nettie Saleem, last A1C 5.6      Past Medical History:   Diagnosis Date    Cancer     colon    Cataract     Chronic kidney disease     Diabetes mellitus     Diabetes mellitus, type 2     Hyperlipidemia     Hypertension        Past Surgical History:   Procedure Laterality Date    AMPUTATION Right     below the knee    CARDIAC CATHETERIZATION      CATARACT EXTRACTION      COLON SURGERY      COLONOSCOPY      COLONOSCOPY N/A 5/22/2018    Procedure: COLONOSCOPY;  Surgeon: Hallie Higuera MD;  [facility];  Service: Endoscopy;  Laterality: N/A;    COLONOSCOPY N/A 6/12/2018    Procedure: COLONOSCOPY;  Surgeon: Hallie Higuera MD;  Location: [facility];  Service: Endoscopy;  Laterality: N/A;    COLONOSCOPY N/A 6/7/2022    Procedure: COLONOSCOPY;  Surgeon: Hallie Higuera MD;  [facility];  Service: Endoscopy;  Laterality: N/A;    SKIN BIOPSY         Family History   Problem Relation Age of Onset    Diabetes Mother     Hypertension Father     Diabetes Sister     Diabetes Brother     Hypertension Brother     No Known Problems Paternal Grandmother     No Known Problems Paternal Grandfather     No Known Problems Maternal Grandmother     No Known Problems Maternal Grandfather        Social History     Socioeconomic History    Marital status:     Number of  "children: 1    Years of education: 12   Tobacco Use    Smoking status: Never     Passive exposure: Current    Smokeless tobacco: Never   Substance and Sexual Activity    Alcohol use: Not Currently     Comment: occ    Drug use: No    Sexual activity: Yes     Partners: Female       Review of Systems   Respiratory:  Negative for cough and shortness of breath.    Cardiovascular:  Negative for chest pain.   Neurological:  Negative for dizziness and light-headedness.         Objective:     Vitals:    11/15/23 0842   BP: (!) 120/50   BP Location: Left arm   Patient Position: Sitting   BP Method: Large (Manual)   Pulse: 62   SpO2: 97%   Weight: 82.6 kg (181 lb 15.8 oz)   Height: 5' 8" (1.727 m)          Physical Exam  Vitals reviewed.   Constitutional:       Appearance: Normal appearance.   HENT:      Head: Normocephalic and atraumatic.   Cardiovascular:      Rate and Rhythm: Normal rate and regular rhythm.      Heart sounds: Normal heart sounds.   Pulmonary:      Effort: Pulmonary effort is normal.      Breath sounds: Normal breath sounds.   Musculoskeletal:      Right shoulder: Tenderness present. Normal range of motion.      Left lower leg: No edema.      Comments: R BKA with prosthesis,  using cane.   Skin:     General: Skin is warm and dry.   Neurological:      General: No focal deficit present.      Mental Status: He is alert and oriented to person, place, and time.   Psychiatric:         Mood and Affect: Mood normal.         Speech: Speech normal.           Laboratory:  CBC:  Recent Labs   Lab Result Units 09/27/23  2233   WBC K/uL 7.70   RBC M/uL 4.24*   Hemoglobin g/dL 13.6*   Hematocrit % 40.0   Platelets K/uL 275   MCV fL 94   MCH pg 32.1*   MCHC g/dL 34.0     CMP:  Recent Labs   Lab Result Units 09/27/23  2233   Glucose mg/dL 140*   Calcium mg/dL 8.7   Albumin g/dL 3.9   Total Protein g/dL 7.5   Sodium mmol/L 143   Potassium mmol/L 4.0   CO2 mmol/L 27   Chloride mmol/L 107   BUN mg/dL 25*   Alkaline Phosphatase " U/L 76   ALT U/L 16   AST U/L 28   Total Bilirubin mg/dL 0.7         Assessment:         ICD-10-CM ICD-9-CM   1. Essential hypertension  I10 401.9   2. Mixed hyperlipidemia  E78.2 272.2   3. Type 2 diabetes mellitus with proliferative retinopathy of both eyes, with long-term current use of insulin, macular edema presence unspecified, unspecified proliferative retinopathy type  E11.3593 250.50    Z79.4 362.02     V58.67   4. Chronic right shoulder pain  M25.511 719.41    G89.29 338.29   5. Dermatitis  L30.9 692.9       Plan:       Essential hypertension  Controlled with current regimen. Followed by Cardiology.     Mixed hyperlipidemia  Stable. Continue statin therapy. Followed by Cardiology.     Type 2 diabetes mellitus with proliferative retinopathy of both eyes, with long-term current use of insulin, macular edema presence unspecified, unspecified proliferative retinopathy type  Controlled, last A1C 5.6. continue medications as prescribed.   Followed by Endocrinology, next appointment 12/4/2023. Encouraged to keep appointment.     Chronic right shoulder pain  Flaring. Tylenol arthritis as needed. Encouraged to f/u with Orthopedics.     Dermatitis  -     ketoconazole (NIZORAL) 2 % shampoo; USE SHAMPOO TOPICALLY ON SCALP TWICE A WEEK  Dispense: 120 mL; Refill: 5        If symptoms worsen, go to ER.  If symptoms do not improve, return to clinic.   Keep appointments with all specialists.     Patient verbalizes understanding and agrees with current treatment plan.      Follow up in about 3 months (around 2/15/2024).     Patient's Medications   New Prescriptions    No medications on file   Previous Medications    ALBUTEROL (PROVENTIL/VENTOLIN HFA) 90 MCG/ACTUATION INHALER    Inhale 1-2 puffs into the lungs every 6 (six) hours as needed for Wheezing. Rescue    ALCOHOL SWABS PADM    Apply 1 each topically as needed (use to help with adhesiveness of Dexcom cgm). IV prep    ALFUZOSIN (UROXATRAL) 10 MG TB24    Take 1 tablet  (10 mg total) by mouth daily with breakfast.    AMLODIPINE (NORVASC) 5 MG TABLET    Take 5 mg by mouth.    ASPIRIN (ECOTRIN) 81 MG EC TABLET    Take 81 mg by mouth once daily.    ATORVASTATIN (LIPITOR) 80 MG TABLET    Take 1 tablet (80 mg total) by mouth every evening.    BETAMETHASONE VALERATE 0.1% (VALISONE) 0.1 % CREA    Apply topically once daily.    BLOOD SUGAR DIAGNOSTIC STRP    Test blood sugar 3 times daily    BLOOD-GLUCOSE METER KIT    One touch verio kit    BLOOD-GLUCOSE METER,CONTINUOUS (DEXCOM G6 ) MISC    1 Device by Misc.(Non-Drug; Combo Route) route once. Dispense 1 device for 1 dose    BLOOD-GLUCOSE SENSOR (DEXCOM G6 SENSOR) CELIA    1 each by Misc.(Non-Drug; Combo Route) route every 10 days. Change once every 10 days. Disp 3pk/month.    BLOOD-GLUCOSE TRANSMITTER (DEXCOM G6 TRANSMITTER) CELIA    1 Device by Misc.(Non-Drug; Combo Route) route every 3 (three) months. Change transmitter every 90 days    CARVEDILOL (COREG) 25 MG TABLET    Take 1 tablet (25 mg total) by mouth 2 (two) times daily with meals.    CLOTRIMAZOLE (LOTRIMIN) 1 % CREAM    Apply 1 application topically 2 (two) times daily.    EMPAGLIFLOZIN (JARDIANCE) 25 MG TABLET    Take 1 tablet (25 mg total) by mouth once daily.    FENOFIBRIC ACID (TRILIPIX) 135 MG CPDR    Take 1 capsule (135 mg total) by mouth once daily.    FLUOCINOLONE (SYNALAR) 0.01 % EXTERNAL SOLUTION    APPLY  SOLUTION TOPICALLY TO SCALP ONCE DAILY IN THE EVENING    GLUCAGON 3 MG/ACTUATION SPRY    1 each by Nasal route as needed (use for severe low blood sugar).    GLUCOSE 4 GM CHEWABLE TABLET    Take 4 tablets (16 g total) by mouth as needed for Low blood sugar.    IBUPROFEN (ADVIL,MOTRIN) 800 MG TABLET    Take 800 mg by mouth every 6 (six) hours as needed.    INSULIN (LANTUS SOLOSTAR U-100 INSULIN) GLARGINE 100 UNITS/ML SUBQ PEN    Inject 40 Units into the skin every evening.    INSULIN LISPRO (HUMALOG KWIKPEN INSULIN) 100 UNIT/ML PEN    Inject 20 Units into the  "skin 3 (three) times daily with meals.    KETOCONAZOLE (NIZORAL) 2 % CREAM    APPLY A THIN LAYER OF  CREAM TOPICALLY TO AFFECTED AREA (FACE) TWICE DAILY    LANCETS MISC    1 lancet by Misc.(Non-Drug; Combo Route) route 3 (three) times daily before meals.    LISINOPRIL 10 MG TABLET    Take 1 tablet (10 mg total) by mouth nightly.    ONDANSETRON (ZOFRAN) 8 MG TABLET    Take 1 tablet (8 mg total) by mouth every 12 (twelve) hours as needed for Nausea.    OZEMPIC 0.25 MG OR 0.5 MG (2 MG/3 ML) PEN INJECTOR    INJECT 0.5 MG INTO THE SKIN EVERY 7 DAYS    PEN NEEDLE, DIABETIC (BD JERRY 2ND GEN PEN NEEDLE) 32 GAUGE X 5/32" NDLE    Inject 1 each into the skin 4 (four) times daily with meals and nightly.    POLYETHYLENE GLYCOL 3350 (MIRALAX ORAL)    Take by mouth.    SPIRONOLACTONE (ALDACTONE) 25 MG TABLET    Take 0.5 tablets (12.5 mg total) by mouth once daily. Take 1/2 tablet every day    TRIAMCINOLONE ACETONIDE 0.1% (KENALOG) 0.1 % CREAM    Mix in a 50:50 portion with your Ketoconazole cream and apply nightly to the affected areas.    TRUEPLUS LANCETS 33 GAUGE MISC    Apply topically 3 (three) times daily.    VARDENAFIL (LEVITRA) 20 MG TABLET    TAKE 1 TABLET (20 MG TOTAL) BY MOUTH DAILY AS NEEDED FOR ERECTILE DYSFUNCTION.   Modified Medications    Modified Medication Previous Medication    KETOCONAZOLE (NIZORAL) 2 % SHAMPOO ketoconazole (NIZORAL) 2 % shampoo       USE SHAMPOO TOPICALLY ON SCALP TWICE A WEEK    USE SHAMPOO TOPICALLY ON SCALP TWICE A WEEK   Discontinued Medications    AZITHROMYCIN (Z-MERYL) 250 MG TABLET    Take 1 tablet (250 mg total) by mouth once daily. Take first 2 tablets together, then 1 every day until finished.    BENZONATATE (TESSALON) 100 MG CAPSULE    Take 2 capsules (200 mg total) by mouth 3 (three) times daily as needed for Cough.    HYDROCODONE-ACETAMINOPHEN (NORCO) 7.5-325 MG PER TABLET    Take 1 tablet by mouth every 6 (six) hours as needed for Pain.    METHOTREXATE 2.5 MG TAB    Take 4 tablets " (10 mg total) by mouth every 7 days.    MUPIROCIN (BACTROBAN) 2 % OINTMENT    Apply topically 3 (three) times daily.    SHINGRIX, PF, 50 MCG/0.5 ML INJECTION             Coni Brock NP

## 2023-11-15 ENCOUNTER — OFFICE VISIT (OUTPATIENT)
Dept: FAMILY MEDICINE | Facility: CLINIC | Age: 68
End: 2023-11-15
Payer: MEDICARE

## 2023-11-15 VITALS
BODY MASS INDEX: 27.58 KG/M2 | HEIGHT: 68 IN | OXYGEN SATURATION: 97 % | DIASTOLIC BLOOD PRESSURE: 50 MMHG | WEIGHT: 182 LBS | HEART RATE: 62 BPM | SYSTOLIC BLOOD PRESSURE: 120 MMHG

## 2023-11-15 DIAGNOSIS — E78.2 MIXED HYPERLIPIDEMIA: Chronic | ICD-10-CM

## 2023-11-15 DIAGNOSIS — I10 ESSENTIAL HYPERTENSION: Primary | Chronic | ICD-10-CM

## 2023-11-15 DIAGNOSIS — L30.9 DERMATITIS: ICD-10-CM

## 2023-11-15 DIAGNOSIS — E11.3593 TYPE 2 DIABETES MELLITUS WITH PROLIFERATIVE RETINOPATHY OF BOTH EYES, WITH LONG-TERM CURRENT USE OF INSULIN, MACULAR EDEMA PRESENCE UNSPECIFIED, UNSPECIFIED PROLIFERATIVE RETINOPATHY TYPE: Chronic | ICD-10-CM

## 2023-11-15 DIAGNOSIS — Z79.4 TYPE 2 DIABETES MELLITUS WITH PROLIFERATIVE RETINOPATHY OF BOTH EYES, WITH LONG-TERM CURRENT USE OF INSULIN, MACULAR EDEMA PRESENCE UNSPECIFIED, UNSPECIFIED PROLIFERATIVE RETINOPATHY TYPE: Chronic | ICD-10-CM

## 2023-11-15 DIAGNOSIS — G89.29 CHRONIC RIGHT SHOULDER PAIN: ICD-10-CM

## 2023-11-15 DIAGNOSIS — M25.511 CHRONIC RIGHT SHOULDER PAIN: ICD-10-CM

## 2023-11-15 PROCEDURE — 4010F PR ACE/ARB THEARPY RXD/TAKEN: ICD-10-PCS | Mod: CPTII,S$GLB,,

## 2023-11-15 PROCEDURE — 3288F PR FALLS RISK ASSESSMENT DOCUMENTED: ICD-10-PCS | Mod: CPTII,S$GLB,,

## 2023-11-15 PROCEDURE — 3078F DIAST BP <80 MM HG: CPT | Mod: CPTII,S$GLB,,

## 2023-11-15 PROCEDURE — 3008F BODY MASS INDEX DOCD: CPT | Mod: CPTII,S$GLB,,

## 2023-11-15 PROCEDURE — 99999 PR PBB SHADOW E&M-EST. PATIENT-LVL V: ICD-10-PCS | Mod: PBBFAC,,,

## 2023-11-15 PROCEDURE — 1101F PR PT FALLS ASSESS DOC 0-1 FALLS W/OUT INJ PAST YR: ICD-10-PCS | Mod: CPTII,S$GLB,,

## 2023-11-15 PROCEDURE — 99214 PR OFFICE/OUTPT VISIT, EST, LEVL IV, 30-39 MIN: ICD-10-PCS | Mod: S$GLB,,,

## 2023-11-15 PROCEDURE — 1125F PR PAIN SEVERITY QUANTIFIED, PAIN PRESENT: ICD-10-PCS | Mod: CPTII,S$GLB,,

## 2023-11-15 PROCEDURE — 1159F PR MEDICATION LIST DOCUMENTED IN MEDICAL RECORD: ICD-10-PCS | Mod: CPTII,S$GLB,,

## 2023-11-15 PROCEDURE — 99999 PR PBB SHADOW E&M-EST. PATIENT-LVL V: CPT | Mod: PBBFAC,,,

## 2023-11-15 PROCEDURE — 3008F PR BODY MASS INDEX (BMI) DOCUMENTED: ICD-10-PCS | Mod: CPTII,S$GLB,,

## 2023-11-15 PROCEDURE — 3044F HG A1C LEVEL LT 7.0%: CPT | Mod: CPTII,S$GLB,,

## 2023-11-15 PROCEDURE — 3074F PR MOST RECENT SYSTOLIC BLOOD PRESSURE < 130 MM HG: ICD-10-PCS | Mod: CPTII,S$GLB,,

## 2023-11-15 PROCEDURE — 3044F PR MOST RECENT HEMOGLOBIN A1C LEVEL <7.0%: ICD-10-PCS | Mod: CPTII,S$GLB,,

## 2023-11-15 PROCEDURE — 3074F SYST BP LT 130 MM HG: CPT | Mod: CPTII,S$GLB,,

## 2023-11-15 PROCEDURE — 1101F PT FALLS ASSESS-DOCD LE1/YR: CPT | Mod: CPTII,S$GLB,,

## 2023-11-15 PROCEDURE — 99214 OFFICE O/P EST MOD 30 MIN: CPT | Mod: S$GLB,,,

## 2023-11-15 PROCEDURE — 4010F ACE/ARB THERAPY RXD/TAKEN: CPT | Mod: CPTII,S$GLB,,

## 2023-11-15 PROCEDURE — 3288F FALL RISK ASSESSMENT DOCD: CPT | Mod: CPTII,S$GLB,,

## 2023-11-15 PROCEDURE — 1125F AMNT PAIN NOTED PAIN PRSNT: CPT | Mod: CPTII,S$GLB,,

## 2023-11-15 PROCEDURE — 1160F PR REVIEW ALL MEDS BY PRESCRIBER/CLIN PHARMACIST DOCUMENTED: ICD-10-PCS | Mod: CPTII,S$GLB,,

## 2023-11-15 PROCEDURE — 1159F MED LIST DOCD IN RCRD: CPT | Mod: CPTII,S$GLB,,

## 2023-11-15 PROCEDURE — 3078F PR MOST RECENT DIASTOLIC BLOOD PRESSURE < 80 MM HG: ICD-10-PCS | Mod: CPTII,S$GLB,,

## 2023-11-15 PROCEDURE — 1160F RVW MEDS BY RX/DR IN RCRD: CPT | Mod: CPTII,S$GLB,,

## 2023-11-15 RX ORDER — AMLODIPINE BESYLATE 5 MG/1
5 TABLET ORAL
COMMUNITY
Start: 2023-10-22 | End: 2023-12-27 | Stop reason: SDUPTHER

## 2023-11-15 RX ORDER — IBUPROFEN 800 MG/1
800 TABLET ORAL EVERY 6 HOURS PRN
COMMUNITY
Start: 2023-10-30 | End: 2023-12-27

## 2023-11-15 RX ORDER — KETOCONAZOLE 20 MG/ML
SHAMPOO, SUSPENSION TOPICAL
Qty: 120 ML | Refills: 5 | Status: SHIPPED | OUTPATIENT
Start: 2023-11-15

## 2023-11-15 NOTE — PATIENT INSTRUCTIONS
Continue to take medications as prescribed.   F/u with Ortho for right shoulder   Keep all f/u with specialists.

## 2023-12-04 PROBLEM — E66.09 CLASS 1 OBESITY DUE TO EXCESS CALORIES WITH SERIOUS COMORBIDITY AND BODY MASS INDEX (BMI) OF 30.0 TO 30.9 IN ADULT: Status: RESOLVED | Noted: 2022-01-05 | Resolved: 2023-12-04

## 2023-12-04 PROBLEM — E66.811 CLASS 1 OBESITY DUE TO EXCESS CALORIES WITH SERIOUS COMORBIDITY AND BODY MASS INDEX (BMI) OF 30.0 TO 30.9 IN ADULT: Status: RESOLVED | Noted: 2022-01-05 | Resolved: 2023-12-04

## 2023-12-27 PROBLEM — R94.31 ABNORMAL EKG: Status: ACTIVE | Noted: 2023-12-27

## 2024-02-15 ENCOUNTER — OFFICE VISIT (OUTPATIENT)
Dept: FAMILY MEDICINE | Facility: CLINIC | Age: 69
End: 2024-02-15
Payer: MEDICARE

## 2024-02-15 VITALS
HEIGHT: 68 IN | WEIGHT: 178.88 LBS | OXYGEN SATURATION: 98 % | HEART RATE: 56 BPM | DIASTOLIC BLOOD PRESSURE: 78 MMHG | SYSTOLIC BLOOD PRESSURE: 162 MMHG | BODY MASS INDEX: 27.11 KG/M2

## 2024-02-15 DIAGNOSIS — Z89.511 S/P BKA (BELOW KNEE AMPUTATION), RIGHT: Chronic | ICD-10-CM

## 2024-02-15 DIAGNOSIS — I73.9 PAD (PERIPHERAL ARTERY DISEASE): Chronic | ICD-10-CM

## 2024-02-15 DIAGNOSIS — H61.22 IMPACTED CERUMEN OF LEFT EAR: ICD-10-CM

## 2024-02-15 DIAGNOSIS — I10 ESSENTIAL HYPERTENSION: Chronic | ICD-10-CM

## 2024-02-15 DIAGNOSIS — E78.2 MIXED HYPERLIPIDEMIA: Chronic | ICD-10-CM

## 2024-02-15 DIAGNOSIS — N40.1 BENIGN PROSTATIC HYPERPLASIA WITH LOWER URINARY TRACT SYMPTOMS, SYMPTOM DETAILS UNSPECIFIED: Chronic | ICD-10-CM

## 2024-02-15 DIAGNOSIS — N18.31 STAGE 3A CHRONIC KIDNEY DISEASE: Primary | ICD-10-CM

## 2024-02-15 DIAGNOSIS — E11.3599 TYPE 2 DIABETES MELLITUS WITH PROLIFERATIVE RETINOPATHY WITHOUT MACULAR EDEMA, WITHOUT LONG-TERM CURRENT USE OF INSULIN, UNSPECIFIED LATERALITY: ICD-10-CM

## 2024-02-15 PROBLEM — E11.319 TYPE 2 DIABETES MELLITUS WITH RETINOPATHY, WITHOUT LONG-TERM CURRENT USE OF INSULIN: Status: ACTIVE | Noted: 2018-11-09

## 2024-02-15 LAB — E/A RATIO: ABNORMAL

## 2024-02-15 PROCEDURE — 1159F MED LIST DOCD IN RCRD: CPT | Mod: CPTII,S$GLB,, | Performed by: FAMILY MEDICINE

## 2024-02-15 PROCEDURE — 99999 PR PBB SHADOW E&M-EST. PATIENT-LVL V: CPT | Mod: PBBFAC,,, | Performed by: FAMILY MEDICINE

## 2024-02-15 PROCEDURE — 3008F BODY MASS INDEX DOCD: CPT | Mod: CPTII,S$GLB,, | Performed by: FAMILY MEDICINE

## 2024-02-15 PROCEDURE — 99214 OFFICE O/P EST MOD 30 MIN: CPT | Mod: S$GLB,,, | Performed by: FAMILY MEDICINE

## 2024-02-15 PROCEDURE — 3288F FALL RISK ASSESSMENT DOCD: CPT | Mod: CPTII,S$GLB,, | Performed by: FAMILY MEDICINE

## 2024-02-15 PROCEDURE — 1101F PT FALLS ASSESS-DOCD LE1/YR: CPT | Mod: CPTII,S$GLB,, | Performed by: FAMILY MEDICINE

## 2024-02-15 PROCEDURE — 1160F RVW MEDS BY RX/DR IN RCRD: CPT | Mod: CPTII,S$GLB,, | Performed by: FAMILY MEDICINE

## 2024-02-15 PROCEDURE — 3078F DIAST BP <80 MM HG: CPT | Mod: CPTII,S$GLB,, | Performed by: FAMILY MEDICINE

## 2024-02-15 PROCEDURE — 3077F SYST BP >= 140 MM HG: CPT | Mod: CPTII,S$GLB,, | Performed by: FAMILY MEDICINE

## 2024-02-15 PROCEDURE — 1125F AMNT PAIN NOTED PAIN PRSNT: CPT | Mod: CPTII,S$GLB,, | Performed by: FAMILY MEDICINE

## 2024-02-15 RX ORDER — AMLODIPINE BESYLATE 5 MG/1
5 TABLET ORAL DAILY
Qty: 90 TABLET | Refills: 3 | Status: SHIPPED | OUTPATIENT
Start: 2024-02-15 | End: 2024-06-04 | Stop reason: ALTCHOICE

## 2024-02-15 NOTE — PROGRESS NOTES
PATIENT VISIT FAMILY MEDICINE    CC:   Chief Complaint   Patient presents with    Follow-up    Hypertension       HPI: Brando Melgoza  is a 68 y.o. male:    Patient is known to me.  Patient presents routine follow up on chronic conditions    Doing well overall. Previous visit with me amlodipine was decreased and now discontinued due to symptomatic hypotension. BP elevated today. He reports home readings with SBP 100s. Does not have medications or cuff with him today. Has not scheduled DM eye exam. Lost 10 lbs on ozempic. Sleeping well. Takes miralax for constipation. No problems urinating.       PMHX:   Past Medical History:   Diagnosis Date    Cancer     colon    Cataract     Chronic kidney disease     Diabetes mellitus     Diabetes mellitus, type 2     Hyperlipidemia     Hypertension        PSHX:   Past Surgical History:   Procedure Laterality Date    AMPUTATION Right     below the knee    CARDIAC CATHETERIZATION      CATARACT EXTRACTION      COLON SURGERY      COLONOSCOPY      COLONOSCOPY N/A 5/22/2018    Procedure: COLONOSCOPY;  Surgeon: Hallie Higuera MD;  Location: [facility];  Service: Endoscopy;  Laterality: N/A;    COLONOSCOPY N/A 6/12/2018    Procedure: COLONOSCOPY;  Surgeon: Hallie Higuera MD;  Location: [facility];  Service: Endoscopy;  Laterality: N/A;    COLONOSCOPY N/A 6/7/2022    Procedure: COLONOSCOPY;  Surgeon: Hallie Higuera MD;  Location: [facility];  Service: Endoscopy;  Laterality: N/A;    SKIN BIOPSY         FAMHX:   Family History   Problem Relation Age of Onset    Diabetes Mother     Hypertension Father     Diabetes Sister     Diabetes Brother     Hypertension Brother     No Known Problems Paternal Grandmother     No Known Problems Paternal Grandfather     No Known Problems Maternal Grandmother     No Known Problems Maternal Grandfather        SOCHX:   Social History     Socioeconomic History    Marital status:     Number of children: 1    Years of education: 12    Tobacco Use    Smoking status: Never     Passive exposure: Current    Smokeless tobacco: Never   Substance and Sexual Activity    Alcohol use: Not Currently     Comment: occ    Drug use: No    Sexual activity: Yes     Partners: Female       ALLERGIES: Review of patient's allergies indicates:  No Known Allergies    MEDS:   Current Outpatient Medications:     albuterol (PROVENTIL/VENTOLIN HFA) 90 mcg/actuation inhaler, Inhale 1-2 puffs into the lungs every 6 (six) hours as needed for Wheezing. Rescue, Disp: 18 g, Rfl: 0    alfuzosin (UROXATRAL) 10 mg Tb24, Take 1 tablet (10 mg total) by mouth daily with breakfast., Disp: 90 tablet, Rfl: 3    aspirin (ECOTRIN) 81 MG EC tablet, Take 81 mg by mouth once daily., Disp: , Rfl:     atorvastatin (LIPITOR) 80 MG tablet, Take 1 tablet (80 mg total) by mouth every evening., Disp: 90 tablet, Rfl: 3    betamethasone valerate 0.1% (VALISONE) 0.1 % Crea, Apply topically once daily., Disp: 45 g, Rfl: 2    carvediloL (COREG) 25 MG tablet, Take 1 tablet (25 mg total) by mouth 2 (two) times daily with meals., Disp: 180 tablet, Rfl: 3    clotrimazole (LOTRIMIN) 1 % cream, Apply 1 application topically 2 (two) times daily., Disp: , Rfl:     empagliflozin (JARDIANCE) 25 mg tablet, Take 1 tablet (25 mg total) by mouth once daily., Disp: 90 tablet, Rfl: 3    fenofibric acid (TRILIPIX) 135 mg CpDR, Take 1 capsule (135 mg total) by mouth once daily., Disp: 90 capsule, Rfl: 3    fluocinolone (SYNALAR) 0.01 % external solution, APPLY  SOLUTION TOPICALLY TO SCALP ONCE DAILY IN THE EVENING, Disp: 60 mL, Rfl: 1    glucagon 3 mg/actuation Spry, 1 each by Nasal route as needed (use for severe low blood sugar)., Disp: 1 each, Rfl: 11    glucose 4 GM chewable tablet, Take 4 tablets (16 g total) by mouth as needed for Low blood sugar., Disp: 30 tablet, Rfl: 12    HYDROcodone-acetaminophen (NORCO) 5-325 mg per tablet, Take 1 tablet by mouth every 6 (six) hours as needed for Pain., Disp: 28 tablet, Rfl:  "0    insulin (LANTUS SOLOSTAR U-100 INSULIN) glargine 100 units/mL SubQ pen, Inject 20 Units into the skin every evening., Disp: 20 mL, Rfl: 1    insulin lispro (HUMALOG KWIKPEN INSULIN) 100 unit/mL pen, Inject 20 Units into the skin 3 (three) times daily with meals., Disp: 18 mL, Rfl: 11    lisinopriL 10 MG tablet, Take 1 tablet (10 mg total) by mouth nightly., Disp: 90 tablet, Rfl: 3    ondansetron (ZOFRAN) 8 MG tablet, Take 1 tablet (8 mg total) by mouth every 12 (twelve) hours as needed for Nausea., Disp: 8 tablet, Rfl: 1    pen needle, diabetic (BD JERRY 2ND GEN PEN NEEDLE) 32 gauge x 5/32" Ndle, Inject 1 each into the skin 4 (four) times daily with meals and nightly., Disp: 360 each, Rfl: 3    POLYETHYLENE GLYCOL 3350 (MIRALAX ORAL), Take by mouth., Disp: , Rfl:     semaglutide (OZEMPIC) 0.25 mg or 0.5 mg (2 mg/3 mL) pen injector, Inject 0.5 mg into the skin every 7 days., Disp: 9 mL, Rfl: 1    spironolactone (ALDACTONE) 25 MG tablet, Take 0.5 tablets (12.5 mg total) by mouth once daily. Take 1/2 tablet every day, Disp: 90 tablet, Rfl: 3    triamcinolone acetonide 0.1% (KENALOG) 0.1 % cream, Mix in a 50:50 portion with your Ketoconazole cream and apply nightly to the affected areas., Disp: 45 g, Rfl: 1    vardenafiL (LEVITRA) 20 MG tablet, TAKE 1 TABLET (20 MG TOTAL) BY MOUTH DAILY AS NEEDED FOR ERECTILE DYSFUNCTION., Disp: 15 tablet, Rfl: 7    alcohol swabs PadM, Apply 1 each topically as needed (use to help with adhesiveness of Dexcom cgm). IV prep, Disp: 50 each, Rfl: 11    amLODIPine (NORVASC) 5 MG tablet, Take 1 tablet (5 mg total) by mouth once daily., Disp: 90 tablet, Rfl: 3    blood sugar diagnostic Strp, Test blood sugar 3 times daily, Disp: 300 strip, Rfl: 3    blood-glucose meter kit, One touch verio kit, Disp: 1 each, Rfl: 0    blood-glucose meter,continuous (DEXCOM G7 ) Misc, Use to check glucose with sensors, Disp: 1 each, Rfl: 0    blood-glucose sensor (DEXCOM G7 SENSOR) Lynnette, 1 Device by " "Misc.(Non-Drug; Combo Route) route every 10 days., Disp: 9 each, Rfl: 3    blood-glucose transmitter (DEXCOM G6 TRANSMITTER) Lynnette, 1 Device by Misc.(Non-Drug; Combo Route) route every 3 (three) months. Change transmitter every 90 days, Disp: 1 each, Rfl: 3    ketoconazole (NIZORAL) 2 % cream, APPLY A THIN LAYER OF  CREAM TOPICALLY TO AFFECTED AREA (FACE) TWICE DAILY, Disp: 120 g, Rfl: 3    ketoconazole (NIZORAL) 2 % shampoo, USE SHAMPOO TOPICALLY ON SCALP TWICE A WEEK, Disp: 120 mL, Rfl: 5    lancets Misc, 1 lancet by Misc.(Non-Drug; Combo Route) route 3 (three) times daily before meals., Disp: 300 each, Rfl: 3    TRUEPLUS LANCETS 33 gauge Misc, Apply topically 3 (three) times daily., Disp: , Rfl:     OBJECTIVE:   Vitals:    02/15/24 0823   BP: (!) 162/78   BP Location: Left arm   Patient Position: Sitting   BP Method: Large (Manual)   Pulse: (!) 56   SpO2: 98%   Weight: 81.1 kg (178 lb 14.5 oz)   Height: 5' 8" (1.727 m)     Body mass index is 27.2 kg/m².      Physical Exam  Vitals and nursing note reviewed.   Constitutional:       Appearance: Normal appearance.   HENT:      Head: Normocephalic.      Right Ear: Tympanic membrane normal. There is no impacted cerumen.      Left Ear: There is impacted cerumen.   Eyes:      General:         Right eye: No discharge.         Left eye: No discharge.      Extraocular Movements: Extraocular movements intact.   Cardiovascular:      Rate and Rhythm: Normal rate and regular rhythm.      Heart sounds: Normal heart sounds.   Pulmonary:      Effort: Pulmonary effort is normal.      Breath sounds: Normal breath sounds.   Musculoskeletal:      Comments: Ambulates with cane  LLE prosthesis   Skin:     Comments: No obvious rash on exposed skin   Neurological:      Mental Status: He is alert.   Psychiatric:         Behavior: Behavior normal.           LABS:   A1C:  Recent Labs   Lab 11/27/23  0851   Hemoglobin A1C 5.8 H     CBC:  Recent Labs   Lab 09/27/23  2233   WBC 7.70   RBC 4.24 L "   Hemoglobin 13.6 L   Hematocrit 40.0   Platelets 275   MCV 94   MCH 32.1 H   MCHC 34.0     CMP:  Recent Labs   Lab 11/27/23  0851   Glucose 166 H   Calcium 9.5   Albumin 3.8   Total Protein 6.9   Sodium 143   Potassium 4.7   CO2 25   Chloride 108   BUN 30 H   Creatinine 1.50 H   Alkaline Phosphatase 84   ALT 21   AST 30   Total Bilirubin 0.6     LIPIDS:  Recent Labs   Lab 07/26/23  1418 11/27/23  0851   TSH  --  1.240   HDL 22 L  --    Cholesterol 91 L  --    Triglycerides 81  --    LDL Cholesterol 52.8 L  --    HDL/Cholesterol Ratio 24.2  --    Non-HDL Cholesterol 69  --    Total Cholesterol/HDL Ratio 4.1  --      TSH:  Recent Labs   Lab 11/27/23  0851   TSH 1.240         ASSESSMENT & PLAN:    Problem List Items Addressed This Visit          Cardiac/Vascular    Essential hypertension (Chronic)    Overview     BP elevated. Resume amlodipine 5mg. Advised to bring home meds, home BP cuff (he gets much lower readings at home) and BP log to follow up.          HLD (hyperlipidemia) (Chronic)    Overview     Stable. Continue statin         PAD (peripheral artery disease) (Chronic)    Overview     Stable. Continue statin, ASA            Renal/    BPH (benign prostatic hyperplasia) (Chronic)    Overview     Stable. Followed by Urology         Stage 3a chronic kidney disease - Primary    Overview     Last Cr slightly increased. Recheck today.  Avoid nephrotoxic agents, renally dose medications, continue medication management of chronic conditions           Relevant Orders    Basic Metabolic Panel       Endocrine    Type 2 diabetes mellitus with retinopathy, without long-term current use of insulin    Overview     Well controlled. Continue current regimen  Followed by Endocrine  Advised to schedule eye exam at [facility]             Orthopedic    S/P BKA (below knee amputation), right (Chronic)    Overview     With prosthesis          Other Visit Diagnoses       Impacted cerumen of left ear    unsuccessful removal.  Offered ENT referral; he'd like to try using debrox drops. Instructions given.     Relevant Orders    Ear wax removal (Completed)              Follow up in about 1 month (around 3/15/2024) for blood pressure.      RTC/ED precautions discussed where applicable.   Encouraged patient to let me know if there are any further questions/concerns.     Advise patient/caretaker to check with insurance regarding orders to avoid unexpected fees/costs.     The patient/caretaker indicates understanding of these issues and agrees with the plan.    Dr. Preethi Willard MD  Family Medicine

## 2024-02-27 ENCOUNTER — TELEPHONE (OUTPATIENT)
Dept: FAMILY MEDICINE | Facility: CLINIC | Age: 69
End: 2024-02-27
Payer: MEDICARE

## 2024-02-27 NOTE — TELEPHONE ENCOUNTER
Patient has not started the new bp medication he has been monitoring his bp his numbers is 120/76 so he is still taking the old bp medication.

## 2024-03-01 VITALS — DIASTOLIC BLOOD PRESSURE: 76 MMHG | SYSTOLIC BLOOD PRESSURE: 120 MMHG

## 2024-03-01 NOTE — TELEPHONE ENCOUNTER
Please advise patient to bring his home blood pressure cuff to his follow up appointment with Coni on 3/15

## 2024-03-18 ENCOUNTER — OFFICE VISIT (OUTPATIENT)
Dept: FAMILY MEDICINE | Facility: CLINIC | Age: 69
End: 2024-03-18
Payer: MEDICARE

## 2024-03-18 VITALS
WEIGHT: 173.31 LBS | BODY MASS INDEX: 26.27 KG/M2 | OXYGEN SATURATION: 98 % | HEART RATE: 56 BPM | DIASTOLIC BLOOD PRESSURE: 70 MMHG | HEIGHT: 68 IN | SYSTOLIC BLOOD PRESSURE: 138 MMHG

## 2024-03-18 DIAGNOSIS — E11.3599 TYPE 2 DIABETES MELLITUS WITH PROLIFERATIVE RETINOPATHY WITHOUT MACULAR EDEMA, WITHOUT LONG-TERM CURRENT USE OF INSULIN, UNSPECIFIED LATERALITY: ICD-10-CM

## 2024-03-18 DIAGNOSIS — I50.30 HEART FAILURE WITH PRESERVED EJECTION FRACTION, NYHA CLASS II: ICD-10-CM

## 2024-03-18 DIAGNOSIS — N18.31 STAGE 3A CHRONIC KIDNEY DISEASE: ICD-10-CM

## 2024-03-18 DIAGNOSIS — I10 ESSENTIAL HYPERTENSION: Primary | Chronic | ICD-10-CM

## 2024-03-18 DIAGNOSIS — S90.415A ABRASION OF TOE OF LEFT FOOT, INITIAL ENCOUNTER: ICD-10-CM

## 2024-03-18 PROCEDURE — 99999 PR PBB SHADOW E&M-EST. PATIENT-LVL V: CPT | Mod: PBBFAC,,,

## 2024-03-18 PROCEDURE — 3075F SYST BP GE 130 - 139MM HG: CPT | Mod: CPTII,S$GLB,,

## 2024-03-18 PROCEDURE — 1101F PT FALLS ASSESS-DOCD LE1/YR: CPT | Mod: CPTII,S$GLB,,

## 2024-03-18 PROCEDURE — 1160F RVW MEDS BY RX/DR IN RCRD: CPT | Mod: CPTII,S$GLB,,

## 2024-03-18 PROCEDURE — 4010F ACE/ARB THERAPY RXD/TAKEN: CPT | Mod: CPTII,S$GLB,,

## 2024-03-18 PROCEDURE — 1126F AMNT PAIN NOTED NONE PRSNT: CPT | Mod: CPTII,S$GLB,,

## 2024-03-18 PROCEDURE — 99214 OFFICE O/P EST MOD 30 MIN: CPT | Mod: S$GLB,,,

## 2024-03-18 PROCEDURE — 3008F BODY MASS INDEX DOCD: CPT | Mod: CPTII,S$GLB,,

## 2024-03-18 PROCEDURE — 1159F MED LIST DOCD IN RCRD: CPT | Mod: CPTII,S$GLB,,

## 2024-03-18 PROCEDURE — 3288F FALL RISK ASSESSMENT DOCD: CPT | Mod: CPTII,S$GLB,,

## 2024-03-18 PROCEDURE — 3078F DIAST BP <80 MM HG: CPT | Mod: CPTII,S$GLB,,

## 2024-03-18 RX ORDER — MUPIROCIN 20 MG/G
OINTMENT TOPICAL 3 TIMES DAILY
Qty: 15 G | Refills: 1 | Status: SHIPPED | OUTPATIENT
Start: 2024-03-18

## 2024-03-18 NOTE — PROGRESS NOTES
Subjective:            Chief Complaint: Follow-up and Hypertension  HPI   Brando Melgoza is a 68 y.o. male, patient of Preethi Willard MD with hypertension, hyperlipidema, PAD, cardiomyopathy, heart failure with preserved EF, BPH, CKD stage 3, type II diabetes, right BKA   known to me, presents today for 1 month Follow-up and Hypertension    Here today for BP f/u, forgot his pill bottles at home.   Amlodipine restarted 1 month ago due to increase in BP. Reports he has not restarted amlodipine as his BP has been running low at home and he sometimes gets dizzy.   Checks BP at home in am and pm. Usually runs good. This am was 108/66, pulse 66 with his equate machine. 100s/60s-190s/70s (this was the highest he has seen).     Takes 16 units lantus daily,  8-10 units Novolog once per day.     Reports sore to his left great toe. Also great toenail is growing over the other.   Was not able to get Diabetic eye exam.       Past Medical History:   Diagnosis Date    Cancer     colon    Cataract     Chronic kidney disease     Diabetes mellitus     Diabetes mellitus, type 2     Hyperlipidemia     Hypertension        Past Surgical History:   Procedure Laterality Date    AMPUTATION Right     below the knee    CARDIAC CATHETERIZATION      CATARACT EXTRACTION      COLON SURGERY      COLONOSCOPY      COLONOSCOPY N/A 5/22/2018    Procedure: COLONOSCOPY;  Surgeon: Hallie Higuera MD;  Location: [facility];  Service: Endoscopy;  Laterality: N/A;    COLONOSCOPY N/A 6/12/2018    Procedure: COLONOSCOPY;  Surgeon: Hallie Higuera MD;  Location: [facility];  Service: Endoscopy;  Laterality: N/A;    COLONOSCOPY N/A 6/7/2022    Procedure: COLONOSCOPY;  Surgeon: Hallie Higuera MD;  Location: [facility];  Service: Endoscopy;  Laterality: N/A;    SKIN BIOPSY         Family History   Problem Relation Age of Onset    Diabetes Mother     Hypertension Father     Diabetes Sister     Diabetes Brother     Hypertension Brother     No  "Known Problems Paternal Grandmother     No Known Problems Paternal Grandfather     No Known Problems Maternal Grandmother     No Known Problems Maternal Grandfather        Social History     Socioeconomic History    Marital status:     Number of children: 1    Years of education: 12   Tobacco Use    Smoking status: Never     Passive exposure: Current    Smokeless tobacco: Never   Substance and Sexual Activity    Alcohol use: Not Currently     Comment: occ    Drug use: No    Sexual activity: Yes     Partners: Female       Review of Systems   Constitutional:  Negative for appetite change.   Cardiovascular:  Negative for leg swelling.   Gastrointestinal:  Negative for blood in stool and diarrhea.   Musculoskeletal:  Negative for gait problem.   Skin:  Negative for color change.   Neurological:  Negative for dizziness and light-headedness.   Psychiatric/Behavioral:  Negative for agitation. The patient is not nervous/anxious.          Objective:     Vitals:    03/18/24 0744 03/18/24 0800   BP: (!) 140/70 138/70   BP Location: Left arm Left arm   Patient Position: Sitting    BP Method: Large (Manual)    Pulse: (!) 56    SpO2: 98%    Weight: 78.6 kg (173 lb 4.5 oz)    Height: 5' 8" (1.727 m)           Physical Exam  Vitals reviewed.   Constitutional:       Appearance: Normal appearance.   HENT:      Head: Normocephalic and atraumatic.   Eyes:      Extraocular Movements: Extraocular movements intact.   Cardiovascular:      Rate and Rhythm: Regular rhythm. Bradycardia present.      Pulses:           Dorsalis pedis pulses are 1+ on the left side.      Heart sounds: Normal heart sounds.   Pulmonary:      Effort: Pulmonary effort is normal.      Breath sounds: Normal breath sounds.   Musculoskeletal:      Left lower leg: No edema.      Comments: Right BKA with prosthetic and cane   Feet:      Left foot:      Skin integrity: Skin breakdown present.      Toenail Condition: Left toenails are abnormally thick.      Comments: " "Toenails thick and discolored.     Sore noted to left great toe, appears to be healing. See attached photos.   Skin:     General: Skin is warm and dry.   Neurological:      General: No focal deficit present.      Mental Status: He is alert and oriented to person, place, and time.   Psychiatric:         Mood and Affect: Mood normal.         Speech: Speech normal.           Laboratory:  CBC:  No results for input(s): "WBC", "RBC", "HGB", "HCT", "PLT", "MCV", "MCH", "MCHC" in the last 2160 hours.  CMP:  Recent Labs   Lab Result Units 02/15/24  0955   Glucose mg/dL 114*   Calcium mg/dL 8.8   Sodium mmol/L 146*   Potassium mmol/L 4.5   CO2 mmol/L 27   Chloride mmol/L 115*   BUN mg/dL 29*     URINALYSIS:  No results for input(s): "COLORU", "CLARITYU", "SPECGRAV", "PHUR", "PROTEINUA", "GLUCOSEU", "BILIRUBINCON", "BLOODU", "WBCU", "RBCU", "BACTERIA", "MUCUS", "NITRITE", "LEUKOCYTESUR", "UROBILINOGEN", "HYALINECASTS" in the last 2160 hours.   LIPIDS:  No results for input(s): "TSH", "HDL", "CHOL", "TRIG", "LDLCALC", "CHOLHDL", "NONHDLCHOL", "TOTALCHOLEST" in the last 2160 hours.  TSH:  No results for input(s): "TSH" in the last 2160 hours.  A1C:  No results for input(s): "HGBA1C" in the last 2160 hours.    Assessment:         ICD-10-CM ICD-9-CM   1. Essential hypertension  I10 401.9   2. Stage 3a chronic kidney disease  N18.31 585.3   3. 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 Heart failure with preserved ejection fraction, NYHA class II  I50.30 428.9   5. Abrasion of toe of left foot, initial encounter  S90.415A 917.0       Plan:       Essential hypertension  BP taken manually by me 138/70, taken with our automatic BP machine 135/73, BP with patient's equate machine 135/73. Ok to hold off on amlodipine for now. Continue to check BP at home and f/u in 1 month.   Reports has not started " amlodipine, instructed to hold amlodipine for now. If BP elevated at home and in clinic on followup, will restart amlodipine.     Stage 3a chronic kidney disease  eGFR improved from 49.8 to 56.5.  Continue to renally dose medications, avoid nephrotoxic drugs.     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 continue medications as prescribed.   Followed by Endocrinology.     Heart failure with preserved ejection fraction, NYHA class II  Stable with medications. Followed by Cardiology.     Abrasion of toe of left foot, initial encounter  -     mupirocin (BACTROBAN) 2 % ointment; Apply topically 3 (three) times daily.  Dispense: 15 g; Refill: 1      If symptoms worsen, go to ER.  If symptoms do not improve, return to clinic.   Keep appointments with all specialists.     Patient verbalizes understanding and agrees with current treatment plan.      Follow up in about 1 month (around 4/18/2024) for BP f/u .     Patient's Medications   New Prescriptions    MUPIROCIN (BACTROBAN) 2 % OINTMENT    Apply topically 3 (three) times daily.   Previous Medications    ALBUTEROL (PROVENTIL/VENTOLIN HFA) 90 MCG/ACTUATION INHALER    Inhale 1-2 puffs into the lungs every 6 (six) hours as needed for Wheezing. Rescue    ALCOHOL SWABS PADM    Apply 1 each topically as needed (use to help with adhesiveness of Dexcom cgm). IV prep    ALFUZOSIN (UROXATRAL) 10 MG TB24    Take 1 tablet (10 mg total) by mouth daily with breakfast.    AMLODIPINE (NORVASC) 5 MG TABLET    Take 1 tablet (5 mg total) by mouth once daily.    ASPIRIN (ECOTRIN) 81 MG EC TABLET    Take 81 mg by mouth once daily.    ATORVASTATIN (LIPITOR) 80 MG TABLET    Take 1 tablet (80 mg total) by mouth every evening.    BETAMETHASONE VALERATE 0.1% (VALISONE) 0.1 % CREA    Apply  topically once daily.    BLOOD SUGAR DIAGNOSTIC STRP    Test blood sugar 3 times daily    BLOOD-GLUCOSE METER KIT    One touch verio kit    BLOOD-GLUCOSE METER,CONTINUOUS (DEXCOM G7 ) MISC    Use to check glucose with sensors    BLOOD-GLUCOSE SENSOR (DEXCOM G7 SENSOR) CELIA    1 Device by Misc.(Non-Drug; Combo Route) route every 10 days.    BLOOD-GLUCOSE TRANSMITTER (DEXCOM G6 TRANSMITTER) CELIA    1 Device by Misc.(Non-Drug; Combo Route) route every 3 (three) months. Change transmitter every 90 days    CARVEDILOL (COREG) 25 MG TABLET    Take 1 tablet (25 mg total) by mouth 2 (two) times daily with meals.    CLOTRIMAZOLE (LOTRIMIN) 1 % CREAM    Apply 1 application topically 2 (two) times daily.    EMPAGLIFLOZIN (JARDIANCE) 25 MG TABLET    Take 1 tablet (25 mg total) by mouth once daily.    FENOFIBRIC ACID (TRILIPIX) 135 MG CPDR    Take 1 capsule (135 mg total) by mouth once daily.    FLUOCINOLONE (SYNALAR) 0.01 % EXTERNAL SOLUTION    APPLY  SOLUTION TOPICALLY TO SCALP ONCE DAILY IN THE EVENING    GLUCAGON 3 MG/ACTUATION SPRY    1 each by Nasal route as needed (use for severe low blood sugar).    GLUCOSE 4 GM CHEWABLE TABLET    Take 4 tablets (16 g total) by mouth as needed for Low blood sugar.    HYDROCODONE-ACETAMINOPHEN (NORCO) 5-325 MG PER TABLET    Take 1 tablet by mouth every 6 (six) hours as needed for Pain.    INSULIN (LANTUS SOLOSTAR U-100 INSULIN) GLARGINE 100 UNITS/ML SUBQ PEN    Inject 20 Units into the skin every evening.    INSULIN LISPRO (HUMALOG KWIKPEN INSULIN) 100 UNIT/ML PEN    Inject 20 Units into the skin 3 (three) times daily with meals.    KETOCONAZOLE (NIZORAL) 2 % CREAM    APPLY A THIN LAYER OF  CREAM TOPICALLY TO AFFECTED AREA (FACE) TWICE DAILY    KETOCONAZOLE (NIZORAL) 2 % SHAMPOO    USE SHAMPOO TOPICALLY ON SCALP TWICE A WEEK    LANCETS MISC    1 lancet by Misc.(Non-Drug; Combo Route) route 3 (three) times daily before meals.    LISINOPRIL 10 MG TABLET    Take 1 tablet (10 mg total)  "by mouth nightly.    ONDANSETRON (ZOFRAN) 8 MG TABLET    Take 1 tablet (8 mg total) by mouth every 12 (twelve) hours as needed for Nausea.    PEN NEEDLE, DIABETIC (BD JERRY 2ND GEN PEN NEEDLE) 32 GAUGE X 5/32" NDLE    Inject 1 each into the skin 4 (four) times daily with meals and nightly.    POLYETHYLENE GLYCOL 3350 (MIRALAX ORAL)    Take by mouth.    SEMAGLUTIDE (OZEMPIC) 0.25 MG OR 0.5 MG (2 MG/3 ML) PEN INJECTOR    Inject 0.5 mg into the skin every 7 days.    SPIRONOLACTONE (ALDACTONE) 25 MG TABLET    Take 0.5 tablets (12.5 mg total) by mouth once daily. Take 1/2 tablet every day    TRIAMCINOLONE ACETONIDE 0.1% (KENALOG) 0.1 % CREAM    Mix in a 50:50 portion with your Ketoconazole cream and apply nightly to the affected areas.    TRUEPLUS LANCETS 33 GAUGE MISC    Apply topically 3 (three) times daily.    VARDENAFIL (LEVITRA) 20 MG TABLET    TAKE 1 TABLET (20 MG TOTAL) BY MOUTH DAILY AS NEEDED FOR ERECTILE DYSFUNCTION.   Modified Medications    No medications on file   Discontinued Medications    No medications on file         Coni Brock NP    "

## 2024-03-18 NOTE — PATIENT INSTRUCTIONS
Your BP machine is reading higher than what we are getting in the office. Continue to check your BP daily. Ok to hold off on taking amlodipine at this time.   Lets follow up in 1 month to check on your dizziness and blood pressure.   Bring the BP machine and all pill bottles to our next appointment.

## 2024-03-26 NOTE — TELEPHONE ENCOUNTER
----- Message from Angelina Thomas sent at 8/10/2020 11:33 AM CDT -----  Type:  Pharmacy Calling to Clarify an RX    Name of Caller: [facility]  Pharmacy Name: [facility]  Prescription Name: diabetic supplies, accu check test strips, lancets, meter, solution, bd single use swabs  What do they need to clarify?: follow up for refill  Best Call Back Number: [phone]  Additional Information: n/a       BEHAVIORAL HEALTH FOLLOW-UP NOTE     3/26/2024     Patient was seen and examined in person, Chart reviewed   Patient's case discussed with staff/team    Chief Complaint: \" I feel good\"    Interim History:     Patient is out on the unit today he is bright pleasant looking forward to going to rehab.  Yesterday he stated he did not want inpatient rehab but today has decided he does want inpatient rehab therefore we will try to align him with the rehabilitation Center.  He is denying all, now stating that he never was suicidal.  He is noted to be friendly engaged in staff and peers, playing cards with peers on the unit.  He has been attending groups.  No behavioral disturbances.  Eating well sleeping well and taking medications.  Insight judgment fair    Appetite:   [x] Normal/Unchanged  [] Increased  [] Decreased      Sleep:       [x] Normal/Unchanged  [] Fair       [] Poor              Energy:    [x] Normal/Unchanged  [] Increased  [] Decreased        SI [] Present  [x] Absent    HI  []Present  [x] Absent     Aggression:  [] yes  [x] no    Patient is [x] able  [] unable to CONTRACT FOR SAFETY     PAST MEDICAL/PSYCHIATRIC HISTORY:   Past Medical History:   Diagnosis Date    Depression     Hypertension        FAMILY/SOCIAL HISTORY:  No family history on file.  Social History     Socioeconomic History    Marital status: Legally      Spouse name: Not on file    Number of children: Not on file    Years of education: Not on file    Highest education level: Not on file   Occupational History    Not on file   Tobacco Use    Smoking status: Former     Current packs/day: 0.00     Average packs/day: 0.5 packs/day for 38.8 years (19.4 ttl pk-yrs)     Types: Cigarettes     Start date:      Quit date: 2023     Years since quittin.4    Smokeless tobacco: Never   Substance and Sexual Activity    Alcohol use: Not Currently    Drug use: Never    Sexual activity: Not on file   Other Topics Concern    Not on file

## 2024-05-14 DIAGNOSIS — E11.3593 TYPE 2 DIABETES MELLITUS WITH PROLIFERATIVE RETINOPATHY OF BOTH EYES, WITH LONG-TERM CURRENT USE OF INSULIN, MACULAR EDEMA PRESENCE UNSPECIFIED, UNSPECIFIED PROLIFERATIVE RETINOPATHY TYPE: ICD-10-CM

## 2024-05-14 DIAGNOSIS — Z79.4 TYPE 2 DIABETES MELLITUS WITH PROLIFERATIVE RETINOPATHY OF BOTH EYES, WITH LONG-TERM CURRENT USE OF INSULIN, MACULAR EDEMA PRESENCE UNSPECIFIED, UNSPECIFIED PROLIFERATIVE RETINOPATHY TYPE: ICD-10-CM

## 2024-05-14 RX ORDER — INSULIN LISPRO 100 [IU]/ML
INJECTION, SOLUTION INTRAVENOUS; SUBCUTANEOUS
Qty: 15 ML | Refills: 3 | Status: SHIPPED | OUTPATIENT
Start: 2024-05-14

## 2024-05-20 ENCOUNTER — PATIENT OUTREACH (OUTPATIENT)
Dept: ADMINISTRATIVE | Facility: HOSPITAL | Age: 69
End: 2024-05-20
Payer: MEDICARE

## 2024-05-20 NOTE — PROGRESS NOTES
Population Health Chart Review & Patient Outreach Details      Additional [facility] Health Notes:               Updates Requested / Reviewed:      Updated Care Coordination Note, Care Everywhere, Care Team Updated, and Immunizations Reconciliation Completed or Queried: [facility] Topics Overdue:      [facility] Score: 1     Eye Exam    RSV Vaccine                  Health Maintenance Topic(s) Outreach Outcomes & Actions Taken:    Eye Exam - Outreach Outcomes & Actions Taken  :

## 2024-06-04 PROBLEM — E11.65 TYPE 2 DIABETES MELLITUS WITH HYPERGLYCEMIA, WITH LONG-TERM CURRENT USE OF INSULIN: Status: ACTIVE | Noted: 2024-06-04

## 2024-06-04 PROBLEM — Z79.4 TYPE 2 DIABETES MELLITUS WITH HYPERGLYCEMIA, WITH LONG-TERM CURRENT USE OF INSULIN: Status: ACTIVE | Noted: 2024-06-04

## 2024-06-04 PROBLEM — E11.319 TYPE 2 DIABETES MELLITUS WITH RETINOPATHY, WITHOUT LONG-TERM CURRENT USE OF INSULIN: Status: RESOLVED | Noted: 2018-11-09 | Resolved: 2024-06-04

## 2024-06-04 PROBLEM — Z79.4 TYPE 2 DIABETES MELLITUS WITH RETINOPATHY, WITH LONG-TERM CURRENT USE OF INSULIN: Status: RESOLVED | Noted: 2018-11-09 | Resolved: 2024-06-04

## 2024-06-04 PROBLEM — E11.649 TYPE 2 DIABETES MELLITUS WITH HYPOGLYCEMIA WITHOUT COMA, WITH LONG-TERM CURRENT USE OF INSULIN: Status: ACTIVE | Noted: 2024-06-04

## 2024-07-31 DIAGNOSIS — E11.9 TYPE 2 DIABETES MELLITUS WITHOUT COMPLICATION: ICD-10-CM

## 2024-09-05 ENCOUNTER — TELEPHONE (OUTPATIENT)
Dept: FAMILY MEDICINE | Facility: CLINIC | Age: 69
End: 2024-09-05
Payer: MEDICARE

## 2024-09-05 NOTE — TELEPHONE ENCOUNTER
----- Message from Preethi Willard MD sent at 9/5/2024 11:25 AM CDT -----  Regarding: eye exam  Please call patient to see if he had his yearly eye exam and if so where; please request records    Sincerely,     Dr Willard

## 2024-10-04 PROBLEM — Z79.4 TYPE 2 DIABETES MELLITUS WITH HYPERGLYCEMIA, WITH LONG-TERM CURRENT USE OF INSULIN: Status: ACTIVE | Noted: 2024-10-04

## 2024-10-04 PROBLEM — E11.65 TYPE 2 DIABETES MELLITUS WITH HYPERGLYCEMIA, WITH LONG-TERM CURRENT USE OF INSULIN: Status: ACTIVE | Noted: 2024-10-04

## 2024-10-31 ENCOUNTER — OFFICE VISIT (OUTPATIENT)
Dept: FAMILY MEDICINE | Facility: CLINIC | Age: 69
End: 2024-10-31
Payer: MEDICARE

## 2024-10-31 VITALS
SYSTOLIC BLOOD PRESSURE: 120 MMHG | HEART RATE: 58 BPM | BODY MASS INDEX: 25.44 KG/M2 | DIASTOLIC BLOOD PRESSURE: 58 MMHG | HEIGHT: 68 IN | WEIGHT: 167.88 LBS | OXYGEN SATURATION: 96 %

## 2024-10-31 DIAGNOSIS — E11.3599 TYPE 2 DIABETES MELLITUS WITH PROLIFERATIVE RETINOPATHY WITHOUT MACULAR EDEMA, WITHOUT LONG-TERM CURRENT USE OF INSULIN, UNSPECIFIED LATERALITY: ICD-10-CM

## 2024-10-31 DIAGNOSIS — I50.30 HEART FAILURE WITH PRESERVED EJECTION FRACTION, NYHA CLASS II: Chronic | ICD-10-CM

## 2024-10-31 DIAGNOSIS — N18.31 STAGE 3A CHRONIC KIDNEY DISEASE: ICD-10-CM

## 2024-10-31 DIAGNOSIS — N40.1 BENIGN PROSTATIC HYPERPLASIA WITH LOWER URINARY TRACT SYMPTOMS, SYMPTOM DETAILS UNSPECIFIED: Chronic | ICD-10-CM

## 2024-10-31 DIAGNOSIS — M79.602 LEFT ARM PAIN: ICD-10-CM

## 2024-10-31 DIAGNOSIS — I10 ESSENTIAL HYPERTENSION: Primary | Chronic | ICD-10-CM

## 2024-10-31 DIAGNOSIS — I73.9 PAD (PERIPHERAL ARTERY DISEASE): Chronic | ICD-10-CM

## 2024-10-31 DIAGNOSIS — Z89.511 S/P BKA (BELOW KNEE AMPUTATION), RIGHT: Chronic | ICD-10-CM

## 2024-10-31 DIAGNOSIS — E78.2 MIXED HYPERLIPIDEMIA: Chronic | ICD-10-CM

## 2024-10-31 PROCEDURE — 1160F RVW MEDS BY RX/DR IN RCRD: CPT | Mod: CPTII,S$GLB,, | Performed by: FAMILY MEDICINE

## 2024-10-31 PROCEDURE — G2211 COMPLEX E/M VISIT ADD ON: HCPCS | Mod: S$GLB,,, | Performed by: FAMILY MEDICINE

## 2024-10-31 PROCEDURE — 3044F HG A1C LEVEL LT 7.0%: CPT | Mod: CPTII,S$GLB,, | Performed by: FAMILY MEDICINE

## 2024-10-31 PROCEDURE — 1126F AMNT PAIN NOTED NONE PRSNT: CPT | Mod: CPTII,S$GLB,, | Performed by: FAMILY MEDICINE

## 2024-10-31 PROCEDURE — 3288F FALL RISK ASSESSMENT DOCD: CPT | Mod: CPTII,S$GLB,, | Performed by: FAMILY MEDICINE

## 2024-10-31 PROCEDURE — 4010F ACE/ARB THERAPY RXD/TAKEN: CPT | Mod: CPTII,S$GLB,, | Performed by: FAMILY MEDICINE

## 2024-10-31 PROCEDURE — 99214 OFFICE O/P EST MOD 30 MIN: CPT | Mod: S$GLB,,, | Performed by: FAMILY MEDICINE

## 2024-10-31 PROCEDURE — 99999 PR PBB SHADOW E&M-EST. PATIENT-LVL V: CPT | Mod: PBBFAC,,, | Performed by: FAMILY MEDICINE

## 2024-10-31 PROCEDURE — 3078F DIAST BP <80 MM HG: CPT | Mod: CPTII,S$GLB,, | Performed by: FAMILY MEDICINE

## 2024-10-31 PROCEDURE — 1159F MED LIST DOCD IN RCRD: CPT | Mod: CPTII,S$GLB,, | Performed by: FAMILY MEDICINE

## 2024-10-31 PROCEDURE — 1101F PT FALLS ASSESS-DOCD LE1/YR: CPT | Mod: CPTII,S$GLB,, | Performed by: FAMILY MEDICINE

## 2024-10-31 PROCEDURE — 3008F BODY MASS INDEX DOCD: CPT | Mod: CPTII,S$GLB,, | Performed by: FAMILY MEDICINE

## 2024-10-31 PROCEDURE — 3074F SYST BP LT 130 MM HG: CPT | Mod: CPTII,S$GLB,, | Performed by: FAMILY MEDICINE

## 2024-10-31 RX ORDER — GABAPENTIN 100 MG/1
100 CAPSULE ORAL 3 TIMES DAILY
Qty: 90 CAPSULE | Refills: 1 | Status: SHIPPED | OUTPATIENT
Start: 2024-10-31 | End: 2025-10-31

## 2024-10-31 RX ORDER — AMLODIPINE BESYLATE 5 MG/1
5 TABLET ORAL
COMMUNITY
Start: 2024-10-17

## 2025-02-03 PROBLEM — Z79.4 TYPE 2 DIABETES MELLITUS WITH HYPERGLYCEMIA, WITH LONG-TERM CURRENT USE OF INSULIN: Status: RESOLVED | Noted: 2024-10-04 | Resolved: 2025-02-03

## 2025-02-03 PROBLEM — N50.812 PAIN IN BOTH TESTICLES: Status: ACTIVE | Noted: 2025-02-03

## 2025-02-03 PROBLEM — E11.65 TYPE 2 DIABETES MELLITUS WITH HYPERGLYCEMIA, WITH LONG-TERM CURRENT USE OF INSULIN: Status: RESOLVED | Noted: 2024-10-04 | Resolved: 2025-02-03

## 2025-02-03 PROBLEM — N50.811 PAIN IN BOTH TESTICLES: Status: ACTIVE | Noted: 2025-02-03

## 2025-02-03 PROBLEM — Z97.8 USES PROSTHESIS: Status: ACTIVE | Noted: 2025-02-03

## 2025-02-03 PROBLEM — Z79.4 TYPE 2 DIABETES MELLITUS WITH PROLIFERATIVE RETINOPATHY OF BOTH EYES, WITH LONG-TERM CURRENT USE OF INSULIN: Status: ACTIVE | Noted: 2025-02-03

## 2025-02-03 PROBLEM — E11.649 TYPE 2 DIABETES MELLITUS WITH HYPOGLYCEMIA WITHOUT COMA, WITH LONG-TERM CURRENT USE OF INSULIN: Status: RESOLVED | Noted: 2024-06-04 | Resolved: 2025-02-03

## 2025-02-03 PROBLEM — E66.3 OVERWEIGHT (BMI 25.0-29.9): Status: ACTIVE | Noted: 2025-02-03

## 2025-02-03 PROBLEM — E11.3593 TYPE 2 DIABETES MELLITUS WITH PROLIFERATIVE RETINOPATHY OF BOTH EYES, WITH LONG-TERM CURRENT USE OF INSULIN: Status: ACTIVE | Noted: 2025-02-03

## 2025-02-03 PROBLEM — Z79.4 TYPE 2 DIABETES MELLITUS WITH HYPOGLYCEMIA WITHOUT COMA, WITH LONG-TERM CURRENT USE OF INSULIN: Status: RESOLVED | Noted: 2024-06-04 | Resolved: 2025-02-03

## 2025-02-21 ENCOUNTER — PATIENT OUTREACH (OUTPATIENT)
Dept: ADMINISTRATIVE | Facility: HOSPITAL | Age: 70
End: 2025-02-21
Payer: MEDICARE

## 2025-03-31 ENCOUNTER — TELEPHONE (OUTPATIENT)
Dept: FAMILY MEDICINE | Facility: CLINIC | Age: 70
End: 2025-03-31
Payer: MEDICARE

## 2025-03-31 ENCOUNTER — RESULTS FOLLOW-UP (OUTPATIENT)
Dept: FAMILY MEDICINE | Facility: CLINIC | Age: 70
End: 2025-03-31

## 2025-03-31 NOTE — TELEPHONE ENCOUNTER
----- Message from Coni Brock NP sent at 3/31/2025  2:25 PM CDT -----  Please contact the patient and let him know that his cholesterol looks good. Continue Lipitor as prescribed. Continue to work on diet modification and exercise as tolerated.   ----- Message -----  From: Lab, Background User  Sent: 3/28/2025   9:14 AM CDT  To: Preethi Willard MD

## 2025-03-31 NOTE — TELEPHONE ENCOUNTER
Spoke pt informed him per NP myke:       ----- Message from Coni Brock NP sent at 3/31/2025  2:25 PM CDT -----  Please contact the patient and let him know that his cholesterol looks good. Continue Lipitor as prescribed. Continue to work on diet modification and exercise as tolerated         Pt stated verbal understanding and did not have any questions/concerns.

## 2025-04-25 ENCOUNTER — PATIENT OUTREACH (OUTPATIENT)
Dept: ADMINISTRATIVE | Facility: HOSPITAL | Age: 70
End: 2025-04-25
Payer: MEDICARE

## 2025-04-25 NOTE — LETTER
AUTHORIZATION FOR RELEASE OF   CONFIDENTIAL INFORMATION    [facility],    We are seeing Brando Melgoza, date of birth 1955, in the clinic at [facility]. Preethi Willard MD is the patient's PCP. Brando Melgoza has an outstanding lab/procedure at the time we reviewed his chart. In order to help keep his health information updated, he has authorized us to request the following medical record(s):                                               (  xx)  EYE EXAM                 Please fax records to [facility], Yunus, Asfiya, [place], [phone]                  Patient Name: Brando Melgoza  : 1955  Patient Phone #: [phone]

## 2025-04-25 NOTE — PROGRESS NOTES
Health Maintenance Topic(s) Outreach Outcomes & Actions Taken:    Eye Exam - Outreach Outcomes & Actions Taken  : External Records Requested & Care Team Updated if Applicable

## 2025-05-06 ENCOUNTER — OFFICE VISIT (OUTPATIENT)
Dept: FAMILY MEDICINE | Facility: CLINIC | Age: 70
End: 2025-05-06
Payer: MEDICARE

## 2025-05-06 VITALS
OXYGEN SATURATION: 100 % | DIASTOLIC BLOOD PRESSURE: 60 MMHG | SYSTOLIC BLOOD PRESSURE: 115 MMHG | HEIGHT: 68 IN | BODY MASS INDEX: 24.71 KG/M2 | WEIGHT: 163 LBS | HEART RATE: 58 BPM

## 2025-05-06 DIAGNOSIS — Z79.4 TYPE 2 DIABETES MELLITUS WITH STABLE PROLIFERATIVE RETINOPATHY OF BOTH EYES, WITH LONG-TERM CURRENT USE OF INSULIN: ICD-10-CM

## 2025-05-06 DIAGNOSIS — B35.1 ONYCHOMYCOSIS OF LEFT GREAT TOE: ICD-10-CM

## 2025-05-06 DIAGNOSIS — I10 ESSENTIAL HYPERTENSION: Chronic | ICD-10-CM

## 2025-05-06 DIAGNOSIS — E11.3553 TYPE 2 DIABETES MELLITUS WITH STABLE PROLIFERATIVE RETINOPATHY OF BOTH EYES, WITH LONG-TERM CURRENT USE OF INSULIN: ICD-10-CM

## 2025-05-06 DIAGNOSIS — N18.31 STAGE 3A CHRONIC KIDNEY DISEASE: Primary | ICD-10-CM

## 2025-05-06 DIAGNOSIS — I95.2 HYPOTENSION DUE TO DRUGS: ICD-10-CM

## 2025-05-06 DIAGNOSIS — Z89.511 S/P BKA (BELOW KNEE AMPUTATION), RIGHT: Chronic | ICD-10-CM

## 2025-05-06 DIAGNOSIS — E78.2 MIXED HYPERLIPIDEMIA: Chronic | ICD-10-CM

## 2025-05-06 DIAGNOSIS — I73.9 PAD (PERIPHERAL ARTERY DISEASE): Chronic | ICD-10-CM

## 2025-05-06 DIAGNOSIS — I50.30 HEART FAILURE WITH PRESERVED EJECTION FRACTION, NYHA CLASS II: Chronic | ICD-10-CM

## 2025-05-06 DIAGNOSIS — L21.9 DERMATITIS, SEBORRHEIC: ICD-10-CM

## 2025-05-06 PROBLEM — E11.3593 TYPE 2 DIABETES MELLITUS WITH PROLIFERATIVE RETINOPATHY OF BOTH EYES, WITH LONG-TERM CURRENT USE OF INSULIN: Chronic | Status: ACTIVE | Noted: 2025-02-03

## 2025-05-06 PROCEDURE — 99999 PR PBB SHADOW E&M-EST. PATIENT-LVL V: CPT | Mod: PBBFAC,,, | Performed by: FAMILY MEDICINE

## 2025-05-06 RX ORDER — KETOCONAZOLE 20 MG/G
CREAM TOPICAL
Qty: 120 G | Refills: 3 | Status: SHIPPED | OUTPATIENT
Start: 2025-05-06

## 2025-05-06 RX ORDER — KETOCONAZOLE 20 MG/ML
SHAMPOO, SUSPENSION TOPICAL
Qty: 120 ML | Refills: 5 | Status: SHIPPED | OUTPATIENT
Start: 2025-05-06

## 2025-05-06 NOTE — PROGRESS NOTES
"PATIENT VISIT FAMILY MEDICINE    CC:   BP    HPI:    HYPOTENSION:  He experiences episodes of low blood pressure occurring twice weekly in the mornings, with readings dropping to 90/50, accompanied by dizziness.    INTEGUMENTARY:  He reports an ongoing issue with his left great toenail.    MEDICATIONS:  He currently takes blood pressure medication, uses topical cream, and medicated shampoo.          MEDS: Current Medications[1]    OBJECTIVE:   Vitals:    05/06/25 0947   BP: 115/60   BP Location: Left arm   Patient Position: Sitting   Pulse: (!) 58   SpO2: 100%   Weight: 73.9 kg (163 lb 0.5 oz)   Height: 5' 8" (1.727 m)     Body mass index is 24.79 kg/m².      Physical exam  Vitals and nursing note reviewed.   Constitutional:   Appearance: Normal appearance.   HENT:   Head: Normocephalic and atraumatic.   Eyes:   General: No scleral icterus.  Extraocular Movements: Extraocular movements intact.   Pulmonary:   Effort: Pulmonary effort is normal.   Neurological:   Mental Status: He is alert.   Significant thickening and diastrophic change to left hallux nail  R BKA         LABS:   A1C:  Recent Labs   Lab 03/28/25  0800   Hemoglobin A1c 6.6 H     CBC:  Recent Labs   Lab 11/13/24  2108   WBC 18.09 H   RBC 3.79 L   Hemoglobin 12.4 L   Hematocrit 36.1 L   Platelets 257   MCV 95   MCH 32.7 H   MCHC 34.3     CMP:  Recent Labs   Lab 03/28/25  0800 04/03/25  1002   Glucose 142 H 218 H   Calcium 9.5 9.5   Albumin 3.9  --    Protein Total 7.3  --    Sodium 143 143   Potassium 5.0 5.1   CO2 25 25   Chloride 109 109   BUN 50 H 45 H   Creatinine 2.3 H 1.9 H   ALP 74  --    ALT 17  --    AST 23  --    Bilirubin Total 0.5  --      LIPIDS:  Recent Labs   Lab 11/27/23  0851 02/06/25  0707 03/28/25  0800   TSH 1.240  --   --    HDL  --    < >  --    HDL Cholesterol  --   --  33 L   Cholesterol Total  --   --  108 L   Cholesterol  --    < >  --    Triglycerides  --    < >  --    Triglyceride  --   --  42   LDL Cholesterol  --    < > 66.6 "   HDL/Cholesterol Ratio  --    < > 30.6   Non-HDL Cholesterol  --    < >  --    Non HDL Cholesterol  --   --  75   Total Cholesterol/HDL Ratio  --    < >  --    Cholesterol/HDL Ratio  --   --  3.3    < > = values in this interval not displayed.     TSH:  Recent Labs   Lab 11/27/23  0851   TSH 1.240         ASSESSMENT & PLAN:    Problem List Items Addressed This Visit          Derm    Dermatitis, seborrheic    Overview   Stable. Continue ketoconazole cream/shampoo         Relevant Medications    ketoconazole (NIZORAL) 2 % cream    ketoconazole (NIZORAL) 2 % shampoo       Cardiac/Vascular    Essential hypertension (Chronic)    Overview   See below         Heart failure with preserved ejection fraction, NYHA class II (Chronic)    Overview   Compensated. NYHA II           HLD (hyperlipidemia) (Chronic)    Overview   Stable. Continue statin         PAD (peripheral artery disease) (Chronic)    Overview   Stable. Continue statin, ASA            Renal/    Stage 3a chronic kidney disease - Primary (Chronic)    Overview   See below.          Relevant Orders    Basic Metabolic Panel       Endocrine    Type 2 diabetes mellitus with proliferative retinopathy of both eyes, with long-term current use of insulin (Chronic)    Overview   Last A1c is 6.6 on 03/28/2025  Continue ozempic, lantus  Denies s/sx of hypoglycemia            Orthopedic    S/P BKA (below knee amputation), right (Chronic)    Overview   Stable. Using prosthesis           Other Visit Diagnoses         Onychomycosis of left great toe        Relevant Orders    Ambulatory referral/consult to Podiatry      Hypotension due to drugs                HYPOTENSION:  - Discontinued amlodipine due to frequent hypotensive episodes.  - Brando reports blood pressure dropping to 90/50 twice weekly, causing dizziness.    HYPERTENSION:  With hypotension - stop amlodipine    CKD: decline from baseline function. May be due to hypotension. See above     Onychomycosis of left great  toe   - Brando reports discoloration of left hallux nail, possibly indicating fungal infection.  - Referred to podiatry for nail care, potentially for treatment of onychomycosis    FOLLOW-UP:  1 month for low BP      Visit today included increased complexity associated with the care of the episodic problem hypotension, onychomycosis addressed and managing the longitudinal care of the patient due to the serious and/or complex managed problem(s) as documented above      RTC/ED precautions discussed where applicable.   Encouraged patient to let me know if there are any further questions/concerns.     Advise patient/caretaker to check with insurance regarding orders to avoid unexpected fees/costs.     The patient/caretaker indicates understanding of these issues and agrees with the plan.    This note was generated with the assistance of ambient listening technology. I attest to having reviewed and edited the generated note for accuracy, though some syntax or spelling errors may persist. Please contact the author of this note for any clarification.      Dr. Preethi Willard MD  Family Medicine       [1]   Current Outpatient Medications:     alcohol swabs PadM, Apply 1 each topically as needed (use to help with adhesiveness of Dexcom cgm). IV prep, Disp: 50 each, Rfl: 11    alfuzosin (UROXATRAL) 10 mg Tb24, Take 1 tablet (10 mg total) by mouth daily with breakfast., Disp: 90 tablet, Rfl: 3    aspirin (ECOTRIN) 81 MG EC tablet, Take 81 mg by mouth once daily., Disp: , Rfl:     atorvastatin (LIPITOR) 80 MG tablet, Take 1 tablet (80 mg total) by mouth every evening., Disp: 90 tablet, Rfl: 3    betamethasone valerate 0.1% (VALISONE) 0.1 % Crea, Apply topically once daily., Disp: 45 g, Rfl: 2    blood-glucose sensor (DEXCOM G7 SENSOR) Lynnette, 1 Device by Misc.(Non-Drug; Combo Route) route every 10 days., Disp: 9 each, Rfl: 3    carvediloL (COREG) 25 MG tablet, Take 1 tablet (25 mg total) by mouth 2 (two) times daily with meals.,  "Disp: 180 tablet, Rfl: 3    cholecalciferol, vitamin D3, (VITAMIN D3) 50 mcg (2,000 unit) Cap capsule, Take 1 capsule (2,000 Units total) by mouth once daily., Disp: 90 capsule, Rfl: 1    clotrimazole (LOTRIMIN) 1 % cream, Apply 1 application topically 2 (two) times daily., Disp: , Rfl:     empagliflozin (JARDIANCE) 25 mg tablet, Take 1 tablet (25 mg total) by mouth once daily., Disp: 90 tablet, Rfl: 3    finasteride (PROSCAR) 5 mg tablet, Take 1 tablet (5 mg total) by mouth once daily., Disp: 90 tablet, Rfl: 3    fluocinolone (SYNALAR) 0.01 % external solution, APPLY  SOLUTION TOPICALLY TO SCALP ONCE DAILY IN THE EVENING, Disp: 60 mL, Rfl: 1    furosemide (LASIX) 40 MG tablet, Take 1 tablet (40 mg total) by mouth as needed (swelling, fluid)., Disp: 30 tablet, Rfl: 11    gabapentin (NEURONTIN) 100 MG capsule, Take 1 capsule (100 mg total) by mouth 3 (three) times daily., Disp: 90 capsule, Rfl: 1    HYDROcodone-acetaminophen (NORCO) 5-325 mg per tablet, Take 1 tablet by mouth every 6 (six) hours as needed for Pain., Disp: 28 tablet, Rfl: 0    insulin glargine U-100, Lantus, (LANTUS SOLOSTAR U-100 INSULIN) 100 unit/mL (3 mL) InPn pen, Inject 10 Units into the skin every evening., Disp: 9 mL, Rfl: 1    insulin lispro 100 unit/mL pen, INJECT 20 UNITS THREE TIMES DAILY WITH MEALS, Disp: 15 mL, Rfl: 3    lancets Misc, 1 lancet by Misc.(Non-Drug; Combo Route) route 3 (three) times daily before meals., Disp: 300 each, Rfl: 3    lisinopriL (PRINIVIL,ZESTRIL) 5 MG tablet, Take 1 tablet (5 mg total) by mouth nightly., Disp: 90 tablet, Rfl: 3    ondansetron (ZOFRAN) 8 MG tablet, Take 1 tablet (8 mg total) by mouth every 12 (twelve) hours as needed for Nausea., Disp: 8 tablet, Rfl: 1    pen needle, diabetic (BD JERRY 2ND GEN PEN NEEDLE) 32 gauge x 5/32" Ndle, USE  4 TIMES DAILY WITH MEALS AND NIGHTLY, Disp: 300 each, Rfl: 1    POLYETHYLENE GLYCOL 3350 (MIRALAX ORAL), Take by mouth., Disp: , Rfl:     semaglutide (OZEMPIC) 0.25 mg " or 0.5 mg (2 mg/3 mL) pen injector, Inject 0.5 mg into the skin every 7 days., Disp: 9 mL, Rfl: 0    triamcinolone acetonide 0.1% (KENALOG) 0.1 % cream, Mix in a 50:50 portion with your Ketoconazole cream and apply nightly to the affected areas., Disp: 45 g, Rfl: 1    TRUEPLUS LANCETS 33 gauge Misc, Apply topically 3 (three) times daily., Disp: , Rfl:     vardenafiL (LEVITRA) 20 MG tablet, Take 1 tablet (20 mg total) by mouth daily as needed for Erectile Dysfunction., Disp: 15 tablet, Rfl: 5    albuterol (PROVENTIL/VENTOLIN HFA) 90 mcg/actuation inhaler, Inhale 1-2 puffs into the lungs every 6 (six) hours as needed for Wheezing. Rescue (Patient not taking: Reported on 5/6/2025), Disp: 18 g, Rfl: 0    blood sugar diagnostic Strp, Test blood sugar 3 times daily (Patient not taking: Reported on 5/6/2025), Disp: 300 strip, Rfl: 3    blood-glucose meter kit, One touch verio kit (Patient not taking: Reported on 5/6/2025), Disp: 1 each, Rfl: 0    blood-glucose meter,continuous (DEXCOM G7 ) Misc, Use to check glucose with sensors (Patient not taking: Reported on 5/6/2025), Disp: 1 each, Rfl: 0    fenofibric acid (TRILIPIX) 135 mg CpDR, Take 1 capsule (135 mg total) by mouth once daily. (Patient not taking: Reported on 5/6/2025), Disp: 90 capsule, Rfl: 3    glucose 4 GM chewable tablet, Take 4 tablets (16 g total) by mouth as needed for Low blood sugar. (Patient not taking: Reported on 5/6/2025), Disp: 30 tablet, Rfl: 12    ketoconazole (NIZORAL) 2 % cream, APPLY A THIN LAYER OF  CREAM TOPICALLY TO AFFECTED AREA (FACE) TWICE DAILY, Disp: 120 g, Rfl: 3    ketoconazole (NIZORAL) 2 % shampoo, USE SHAMPOO TOPICALLY ON SCALP TWICE A WEEK, Disp: 120 mL, Rfl: 5    mupirocin (BACTROBAN) 2 % ointment, Apply topically 3 (three) times daily. (Patient not taking: Reported on 5/6/2025), Disp: 15 g, Rfl: 1

## 2025-05-20 ENCOUNTER — OFFICE VISIT (OUTPATIENT)
Dept: FAMILY MEDICINE | Facility: CLINIC | Age: 70
End: 2025-05-20
Payer: MEDICARE

## 2025-05-20 VITALS
DIASTOLIC BLOOD PRESSURE: 68 MMHG | HEIGHT: 68 IN | OXYGEN SATURATION: 98 % | SYSTOLIC BLOOD PRESSURE: 134 MMHG | RESPIRATION RATE: 14 BRPM | BODY MASS INDEX: 24.48 KG/M2 | HEART RATE: 50 BPM | WEIGHT: 161.5 LBS

## 2025-05-20 DIAGNOSIS — E11.3553 TYPE 2 DIABETES MELLITUS WITH STABLE PROLIFERATIVE RETINOPATHY OF BOTH EYES, WITH LONG-TERM CURRENT USE OF INSULIN: Chronic | ICD-10-CM

## 2025-05-20 DIAGNOSIS — I15.2 HYPERTENSION ASSOCIATED WITH DIABETES: Primary | ICD-10-CM

## 2025-05-20 DIAGNOSIS — Z89.511 S/P BKA (BELOW KNEE AMPUTATION), RIGHT: Chronic | ICD-10-CM

## 2025-05-20 DIAGNOSIS — E11.59 HYPERTENSION ASSOCIATED WITH DIABETES: Primary | ICD-10-CM

## 2025-05-20 DIAGNOSIS — Z97.8 USES PROSTHESIS: ICD-10-CM

## 2025-05-20 DIAGNOSIS — Z79.4 TYPE 2 DIABETES MELLITUS WITH STABLE PROLIFERATIVE RETINOPATHY OF BOTH EYES, WITH LONG-TERM CURRENT USE OF INSULIN: Chronic | ICD-10-CM

## 2025-05-20 PROCEDURE — 1126F AMNT PAIN NOTED NONE PRSNT: CPT | Mod: CPTII,S$GLB,,

## 2025-05-20 PROCEDURE — 99999 PR PBB SHADOW E&M-EST. PATIENT-LVL V: CPT | Mod: PBBFAC,,,

## 2025-05-20 PROCEDURE — 3008F BODY MASS INDEX DOCD: CPT | Mod: CPTII,S$GLB,,

## 2025-05-20 PROCEDURE — 3061F NEG MICROALBUMINURIA REV: CPT | Mod: CPTII,S$GLB,,

## 2025-05-20 PROCEDURE — 3044F HG A1C LEVEL LT 7.0%: CPT | Mod: CPTII,S$GLB,,

## 2025-05-20 PROCEDURE — 3075F SYST BP GE 130 - 139MM HG: CPT | Mod: CPTII,S$GLB,,

## 2025-05-20 PROCEDURE — 1159F MED LIST DOCD IN RCRD: CPT | Mod: CPTII,S$GLB,,

## 2025-05-20 PROCEDURE — 1160F RVW MEDS BY RX/DR IN RCRD: CPT | Mod: CPTII,S$GLB,,

## 2025-05-20 PROCEDURE — 2023F DILAT RTA XM W/O RTNOPTHY: CPT | Mod: CPTII,S$GLB,,

## 2025-05-20 PROCEDURE — 4010F ACE/ARB THERAPY RXD/TAKEN: CPT | Mod: CPTII,S$GLB,,

## 2025-05-20 PROCEDURE — 3078F DIAST BP <80 MM HG: CPT | Mod: CPTII,S$GLB,,

## 2025-05-20 PROCEDURE — 3066F NEPHROPATHY DOC TX: CPT | Mod: CPTII,S$GLB,,

## 2025-05-20 PROCEDURE — 99215 OFFICE O/P EST HI 40 MIN: CPT | Mod: S$GLB,,,

## 2025-05-20 NOTE — PROGRESS NOTES
Subjective:              Chief Complaint: BP follow up   HPI   Brando Melgoza is a 70 y.o. male, patient of Preethi Willard MD with  hypertension, hyperlipidema, PAD, cardiomyopathy, heart failure with preserved EF, BPH, CKD stage 3, type II diabetes, right BKA, known to me, presents today for a 2 week BP follow up     Overall feeling well.     Hypertension: BP at home runs in 90s/50s both in the morning and in the evening. Currently taking carvedilol 25 mg BID, lisinopril 5 mg nightly; amlodipine stopped last visit. Reports improvement in dizziness. Feels like he is dizzy when he makes quick movements. Dizziness is not constant.  Works outside as . Drinks 5-6 cups coffee per day, 4 glasses of water daily.   Does not have BP log or machine with him today.     Diabetes: using Dexcom, just put new sensor. Current glucose 176 fasting. Usually 130s fasting.   Takes about 8 units of Lispro, doesn't take when glucose is in the 90s. Lantus 10 every evening, Ozempic 0.5 mg weekly.       Past Medical History:   Diagnosis Date    Cancer     colon    Cataract     Chronic kidney disease     Diabetes mellitus     Diabetes mellitus, type 2     Hyperlipidemia     Hypertension     Proliferative diabetic retinopathy 04/16/2025       Past Surgical History:   Procedure Laterality Date    AMPUTATION Right     below the knee    CARDIAC CATHETERIZATION      CATARACT EXTRACTION      COLON SURGERY      COLONOSCOPY      COLONOSCOPY N/A 5/22/2018    Procedure: COLONOSCOPY;  Surgeon: Hallie Higuera MD;  Location: [facility];  Service: Endoscopy;  Laterality: N/A;    COLONOSCOPY N/A 6/12/2018    Procedure: COLONOSCOPY;  Surgeon: Hallie Higuera MD;  Location: [facility];  Service: Endoscopy;  Laterality: N/A;    COLONOSCOPY N/A 6/7/2022    Procedure: COLONOSCOPY;  Surgeon: Hallie Higuera MD;  Location: [facility];  Service: Endoscopy;  Laterality: N/A;    SKIN BIOPSY         Family History   Problem Relation  "Name Age of Onset    Diabetes Mother      Hypertension Father      Diabetes Sister      Diabetes Brother      Hypertension Brother      No Known Problems Paternal Grandmother      No Known Problems Paternal Grandfather      No Known Problems Maternal Grandmother      No Known Problems Maternal Grandfather         Social History     Socioeconomic History    Marital status:     Number of children: 1    Years of education: 12   Tobacco Use    Smoking status: Never     Passive exposure: Current    Smokeless tobacco: Never   Substance and Sexual Activity    Alcohol use: Not Currently     Comment: occ    Drug use: No    Sexual activity: Yes     Partners: Female     Social Drivers of Health     Financial Resource Strain: High Risk (12/5/2024)    Received from [facility] SDOH Screening     In the past year, have you been unable to get any of the following when you really needed them? choose all that apply.: Internet     In the past year, have you been unable to get any of the following when you really needed them? choose all that apply.: Medicine or health care   Housing Stability: High Risk (12/8/2024)    Received from [facility] SDOH Screening     In the past year, have you been unable to get any of the following when you really needed them? choose all that apply.: Utilities (electric, gas, and water)       Review of Systems   Constitutional:  Negative for fever.   Respiratory:  Negative for cough and shortness of breath.    Cardiovascular:  Negative for leg swelling.   Gastrointestinal:  Negative for constipation, diarrhea, nausea and vomiting.   Neurological:  Positive for dizziness. Negative for numbness.         Objective:     Vitals:    05/20/25 0747   BP: 134/68   BP Location: Left arm   Patient Position: Sitting   Pulse: (!) 50   Resp: 14   SpO2: 98%   Weight: 73.2 kg (161 lb 7.8 oz)   Height: 5' 8" (1.727 m)          Physical Exam  Vitals reviewed.   Constitutional:       " "Appearance: Normal appearance. He is well-developed.   HENT:      Head: Normocephalic and atraumatic.   Eyes:      General: No scleral icterus.     Extraocular Movements: Extraocular movements intact.   Cardiovascular:      Rate and Rhythm: Regular rhythm. Bradycardia present.      Heart sounds: Normal heart sounds.   Pulmonary:      Effort: Pulmonary effort is normal.      Breath sounds: Normal breath sounds. No wheezing, rhonchi or rales.   Musculoskeletal:      Left lower leg: No edema.      Comments: Right BKA with prosthetic, using cane.    Skin:     General: Skin is warm and dry.      Comments: No obvious rashes noted.    Neurological:      General: No focal deficit present.      Mental Status: He is alert and oriented to person, place, and time.   Psychiatric:         Mood and Affect: Mood normal.         Speech: Speech normal.         Behavior: Behavior is cooperative.           Laboratory:  CBC:  No results for input(s): "WBC", "RBC", "HGB", "HCT", "PLT", "MCV", "MCH", "MCHC" in the last 2160 hours.  CMP:  Recent Labs   Lab Result Units 03/28/25  0800 04/03/25  1002   Glucose mg/dL 142* 218*   Calcium mg/dL 9.5 9.5   Albumin g/dL 3.9  --    Protein Total gm/dL 7.3  --    Sodium mmol/L 143 143   Potassium mmol/L 5.0 5.1   CO2 mmol/L 25 25   Chloride mmol/L 109 109   BUN mg/dL 50* 45*   ALP unit/L 74  --    ALT unit/L 17  --    AST unit/L 23  --    Bilirubin Total mg/dL 0.5  --      URINALYSIS:  No results for input(s): "COLORU", "CLARITYU", "SPECGRAV", "PHUR", "PROTEINUA", "GLUCOSEU", "BILIRUBINCON", "BLOODU", "WBCU", "RBCU", "BACTERIA", "MUCUS", "NITRITE", "LEUKOCYTESUR", "UROBILINOGEN", "HYALINECASTS" in the last 2160 hours.   LIPIDS:  Recent Labs   Lab Result Units 03/28/25  0800   HDL Cholesterol mg/dL 33*   Cholesterol Total mg/dL 108*   Triglyceride mg/dL 42   LDL Cholesterol mg/dL 66.6   HDL/Cholesterol Ratio % 30.6   Non HDL Cholesterol mg/dL 75   Cholesterol/HDL Ratio  3.3     TSH:  No results for " "input(s): "TSH" in the last 2160 hours.  A1C:  Recent Labs   Lab Result Units 03/28/25  0800   Hemoglobin A1c % 6.6*       Assessment:         ICD-10-CM ICD-9-CM   1. Hypertension associated with diabetes  E11.59 250.80    I15.2 401.9   2. Type 2 diabetes mellitus with stable proliferative retinopathy of both eyes, with long-term current use of insulin  E11.3553 250.50    Z79.4 362.02     V58.67   3. S/P BKA (below knee amputation), right  Z89.511 V49.75   4. Uses prosthesis  Z97.8 [id number]       Plan:       Hypertension associated with diabetes  -Patient reports BP 90s/50s at home consistently.   -BP this am in clinic 134/68 after sitting for about 15 minutes.   -Dizziness improved since stopping amlodipine.   -Continue with carvedilol 25 mg BID, lisinopril 5 mg nightly. Instructed to hold lisinopril at night if BP in 90s/50s, check BP the next morning. Bring log and machine to next appointment.     Type 2 diabetes mellitus with stable proliferative retinopathy of both eyes, with long-term current use of insulin  Last A1C 6.6.   Current glucose 176. On Dexcom.   Continue with 8 units of Lispro, Lantus 10 every evening, Ozempic 0.5 mg weekly.   A1C in September.     S/P BKA (below knee amputation), right  Uses prosthesis  Stable with prosthesis.       If symptoms worsen, go to ER.  If symptoms do not improve, return to clinic.   Keep appointments with all specialists.       Patient verbalizes understanding and agrees with current treatment plan.      Follow up in about 2 weeks (around 6/3/2025) for blood pressure.     Patient's Medications   New Prescriptions    No medications on file   Previous Medications    ALBUTEROL (PROVENTIL/VENTOLIN HFA) 90 MCG/ACTUATION INHALER    Inhale 1-2 puffs into the lungs every 6 (six) hours as needed for Wheezing. Rescue    ALCOHOL SWABS PADM    Apply 1 each topically as needed (use to help with adhesiveness of Dexcom cgm). IV prep    ALFUZOSIN (UROXATRAL) 10 MG TB24    Take 1 tablet " (10 mg total) by mouth daily with breakfast.    ASPIRIN (ECOTRIN) 81 MG EC TABLET    Take 81 mg by mouth once daily.    ATORVASTATIN (LIPITOR) 80 MG TABLET    Take 1 tablet (80 mg total) by mouth every evening.    BETAMETHASONE VALERATE 0.1% (VALISONE) 0.1 % CREA    Apply topically once daily.    BLOOD SUGAR DIAGNOSTIC STRP    Test blood sugar 3 times daily    BLOOD-GLUCOSE METER KIT    One touch verio kit    BLOOD-GLUCOSE METER,CONTINUOUS (DEXCOM G7 ) MISC    Use to check glucose with sensors    BLOOD-GLUCOSE SENSOR (DEXCOM G7 SENSOR) CELIA    1 Device by Misc.(Non-Drug; Combo Route) route every 10 days.    CARVEDILOL (COREG) 25 MG TABLET    Take 1 tablet (25 mg total) by mouth 2 (two) times daily with meals.    CHOLECALCIFEROL, VITAMIN D3, (VITAMIN D3) 50 MCG (2,000 UNIT) CAP CAPSULE    Take 1 capsule (2,000 Units total) by mouth once daily.    CLOTRIMAZOLE (LOTRIMIN) 1 % CREAM    Apply 1 application topically 2 (two) times daily.    EMPAGLIFLOZIN (JARDIANCE) 25 MG TABLET    Take 1 tablet (25 mg total) by mouth once daily.    FENOFIBRIC ACID (TRILIPIX) 135 MG CPDR    Take 1 capsule (135 mg total) by mouth once daily.    FINASTERIDE (PROSCAR) 5 MG TABLET    Take 1 tablet (5 mg total) by mouth once daily.    FLUOCINOLONE (SYNALAR) 0.01 % EXTERNAL SOLUTION    APPLY  SOLUTION TOPICALLY TO SCALP ONCE DAILY IN THE EVENING    FUROSEMIDE (LASIX) 40 MG TABLET    Take 1 tablet (40 mg total) by mouth as needed (swelling, fluid).    GABAPENTIN (NEURONTIN) 100 MG CAPSULE    Take 1 capsule (100 mg total) by mouth 3 (three) times daily.    GLUCOSE 4 GM CHEWABLE TABLET    Take 4 tablets (16 g total) by mouth as needed for Low blood sugar.    HYDROCODONE-ACETAMINOPHEN (NORCO) 5-325 MG PER TABLET    Take 1 tablet by mouth every 6 (six) hours as needed for Pain.    INSULIN GLARGINE U-100, LANTUS, (LANTUS SOLOSTAR U-100 INSULIN) 100 UNIT/ML (3 ML) INPN PEN    Inject 10 Units into the skin every evening.    INSULIN LISPRO 100  "UNIT/ML PEN    INJECT 20 UNITS THREE TIMES DAILY WITH MEALS    KETOCONAZOLE (NIZORAL) 2 % CREAM    APPLY A THIN LAYER OF  CREAM TOPICALLY TO AFFECTED AREA (FACE) TWICE DAILY    KETOCONAZOLE (NIZORAL) 2 % SHAMPOO    USE SHAMPOO TOPICALLY ON SCALP TWICE A WEEK    LANCETS MISC    1 lancet by Misc.(Non-Drug; Combo Route) route 3 (three) times daily before meals.    LISINOPRIL (PRINIVIL,ZESTRIL) 5 MG TABLET    Take 1 tablet (5 mg total) by mouth nightly.    MUPIROCIN (BACTROBAN) 2 % OINTMENT    Apply topically 3 (three) times daily.    ONDANSETRON (ZOFRAN) 8 MG TABLET    Take 1 tablet (8 mg total) by mouth every 12 (twelve) hours as needed for Nausea.    PEN NEEDLE, DIABETIC (BD JERRY 2ND GEN PEN NEEDLE) 32 GAUGE X 5/32" NDLE    USE  4 TIMES DAILY WITH MEALS AND NIGHTLY    POLYETHYLENE GLYCOL 3350 (MIRALAX ORAL)    Take by mouth.    SEMAGLUTIDE (OZEMPIC) 0.25 MG OR 0.5 MG (2 MG/3 ML) PEN INJECTOR    Inject 0.5 mg into the skin every 7 days.    TRIAMCINOLONE ACETONIDE 0.1% (KENALOG) 0.1 % CREAM    Mix in a 50:50 portion with your Ketoconazole cream and apply nightly to the affected areas.    TRUEPLUS LANCETS 33 GAUGE MISC    Apply topically 3 (three) times daily.    VARDENAFIL (LEVITRA) 20 MG TABLET    Take 1 tablet (20 mg total) by mouth daily as needed for Erectile Dysfunction.   Modified Medications    No medications on file   Discontinued Medications    No medications on file       I spent a total of 44 minutes on the day of the visit.  This includes face to face time and non-face to face time preparing to see the patient (eg, review of tests), obtaining and/or reviewing separately obtained history, documenting clinical information in the electronic or other health record, independently interpreting results and communicating results to the patient/family/caregiver, or care coordinator.          Coni Brock NP    "

## 2025-05-20 NOTE — PATIENT INSTRUCTIONS
Keep a blood pressure log over the next 2 weeks. Your goal blood pressure is less than 140/90              -check it around the same time each day              -make sure you are resting for 5 minutes prior to checking              -be seated with your legs uncrossed   -make sure bladder is empty              -I prefer a upper arm cuff instead of a wrist cuff because it tends to be more accurate  Bring BP machine and BP log to appointment.     If BP in 90s/50s at night, hold lisinopril and check in the morning. Keep log and bring to next appointment.

## 2025-06-03 ENCOUNTER — OFFICE VISIT (OUTPATIENT)
Dept: FAMILY MEDICINE | Facility: CLINIC | Age: 70
End: 2025-06-03
Payer: MEDICARE

## 2025-06-03 VITALS
SYSTOLIC BLOOD PRESSURE: 114 MMHG | HEART RATE: 51 BPM | WEIGHT: 159.31 LBS | BODY MASS INDEX: 24.14 KG/M2 | HEIGHT: 68 IN | OXYGEN SATURATION: 96 % | DIASTOLIC BLOOD PRESSURE: 60 MMHG

## 2025-06-03 DIAGNOSIS — E11.59 HYPERTENSION ASSOCIATED WITH DIABETES: Primary | ICD-10-CM

## 2025-06-03 DIAGNOSIS — I15.2 HYPERTENSION ASSOCIATED WITH DIABETES: Primary | ICD-10-CM

## 2025-06-03 DIAGNOSIS — Z79.4 TYPE 2 DIABETES MELLITUS WITH STABLE PROLIFERATIVE RETINOPATHY OF BOTH EYES, WITH LONG-TERM CURRENT USE OF INSULIN: Chronic | ICD-10-CM

## 2025-06-03 DIAGNOSIS — E11.3553 TYPE 2 DIABETES MELLITUS WITH STABLE PROLIFERATIVE RETINOPATHY OF BOTH EYES, WITH LONG-TERM CURRENT USE OF INSULIN: Chronic | ICD-10-CM

## 2025-06-03 DIAGNOSIS — N18.31 STAGE 3A CHRONIC KIDNEY DISEASE: ICD-10-CM

## 2025-06-03 PROCEDURE — 99999 PR PBB SHADOW E&M-EST. PATIENT-LVL V: CPT | Mod: PBBFAC,,,

## 2025-06-03 RX ORDER — LISINOPRIL 2.5 MG/1
2.5 TABLET ORAL NIGHTLY
Qty: 90 TABLET | Refills: 3 | Status: SHIPPED | OUTPATIENT
Start: 2025-06-03 | End: 2026-06-03

## 2025-06-04 NOTE — TELEPHONE ENCOUNTER
----- Message from Nita Gonsalez sent at 8/22/2018  9:07 AM CDT -----  Contact: [facility] [phone]  Calling to talk to nurse concerning to see if patients medical necessity form and include orders for a metric meter lancets and test stripes please  faxed to [phone] patient has been waiting for this since April.   
Return call, unaware the reason of the call, left message to return my call  
normal...

## 2025-06-09 ENCOUNTER — TELEPHONE (OUTPATIENT)
Dept: FAMILY MEDICINE | Facility: CLINIC | Age: 70
End: 2025-06-09
Payer: MEDICARE

## 2025-06-09 DIAGNOSIS — I50.30 HEART FAILURE WITH PRESERVED EJECTION FRACTION, NYHA CLASS II: Primary | Chronic | ICD-10-CM

## 2025-06-09 NOTE — PROGRESS NOTES
Patient with BP in the 90s/50s at night. Still with dizziness after making medication adjustments.  He takes 25 Carvedilol BID and lisinopril 2.5 mg at night which was recently decreased by me, instructed him to hold the lisinopril for systolic in the 90s. Collaborated with Dr. Willard and the recommendation is to stop lisinopril and potentially decrease carvedilol, order Echo. Will have patient to stop lisinopril, will have echo scheduled. Will keep Carvedilol at current dose for now. Per chart, last Echo was in 2018. Patient currently followed by Cardiology Dr. Tineo. Will have patient to schedule a follow up with Cardiology for further management of his BP medications.

## 2025-06-09 NOTE — TELEPHONE ENCOUNTER
Call placed to patient. Patient does not appear confused.     Patient reports BP has been good, has not been taking lisinopril as BP has been in the 90s at night. Last had lisinopril on Wednesday. Since Wednesday, BP has been 105-110/50s at night; mornings have been the same. Instructed to stop lisinopril. Continue with carvedilol at current dose. Echo scheduled. Instructed to f/u with Cardiology.     Patient verbalizes understanding and agrees with current treatment plan.

## 2025-06-30 PROBLEM — M12.811 ROTATOR CUFF ARTHROPATHY, RIGHT: Status: ACTIVE | Noted: 2025-06-30

## 2025-07-16 ENCOUNTER — TELEPHONE (OUTPATIENT)
Dept: FAMILY MEDICINE | Facility: CLINIC | Age: 70
End: 2025-07-16
Payer: MEDICARE

## 2025-07-16 NOTE — TELEPHONE ENCOUNTER
----- Message from Coni Brock NP sent at 7/16/2025  3:23 PM CDT -----  Please contact the patient and let him know that his heart ultrasound did not show any concerning findings.   Ask him how has his blood pressure been running and if he is still having dizzy episodes and let me know.   ----- Message -----  From: Jua nCarlos Mooney MD  Sent: 7/2/2025   3:10 PM CDT  To: Coni Brock NP

## 2025-08-04 ENCOUNTER — TELEPHONE (OUTPATIENT)
Dept: FAMILY MEDICINE | Facility: CLINIC | Age: 70
End: 2025-08-04
Payer: MEDICARE

## 2025-08-04 NOTE — TELEPHONE ENCOUNTER
Copied from CRM #[id number]. Topic: General Inquiry - Return Call  >> Aug 4, 2025 12:11 PM Yandel wrote:  .Type:  Needs Medical Advice    Who Called: pt    Would the patient rather a call back or a response via [facility]? Call back   Best Call Back Number: [phone]  Additional Information:      Pt stated he would like a call back regarding his left foot pain

## 2025-08-04 NOTE — TELEPHONE ENCOUNTER
Spoke to pt and he informed me that he injured his toe and would like to see a podiatrist. Pt stated he did not want to wait for a visit with Dr Willard and he did not want to see an NP. Pt stated he also did not want to go to ER/urgent care because he was just there last night. Pt informed that a message would be sent to Dr Willard for a referral to podiatry. No other questions/concerns.

## 2025-08-08 ENCOUNTER — TELEPHONE (OUTPATIENT)
Dept: PODIATRY | Facility: CLINIC | Age: 70
End: 2025-08-08
Payer: MEDICARE

## 2025-08-14 ENCOUNTER — OFFICE VISIT (OUTPATIENT)
Dept: FAMILY MEDICINE | Facility: CLINIC | Age: 70
End: 2025-08-14
Payer: MEDICARE

## 2025-08-14 VITALS
SYSTOLIC BLOOD PRESSURE: 110 MMHG | OXYGEN SATURATION: 95 % | BODY MASS INDEX: 24.26 KG/M2 | HEIGHT: 68 IN | WEIGHT: 160.06 LBS | DIASTOLIC BLOOD PRESSURE: 60 MMHG | HEART RATE: 56 BPM

## 2025-08-14 DIAGNOSIS — L97.529 DIABETIC ULCER OF LEFT GREAT TOE: Primary | ICD-10-CM

## 2025-08-14 DIAGNOSIS — L97.522 DIABETIC ULCER OF TOE OF LEFT FOOT ASSOCIATED WITH DIABETES MELLITUS DUE TO UNDERLYING CONDITION, WITH FAT LAYER EXPOSED: ICD-10-CM

## 2025-08-14 DIAGNOSIS — E11.621 DIABETIC ULCER OF LEFT GREAT TOE: Primary | ICD-10-CM

## 2025-08-14 DIAGNOSIS — E11.43 PERIPHERAL AUTONOMIC NEUROPATHY DUE TO DIABETES MELLITUS: ICD-10-CM

## 2025-08-14 DIAGNOSIS — E08.621 DIABETIC ULCER OF TOE OF LEFT FOOT ASSOCIATED WITH DIABETES MELLITUS DUE TO UNDERLYING CONDITION, WITH FAT LAYER EXPOSED: ICD-10-CM

## 2025-08-14 DIAGNOSIS — R21 RASH: ICD-10-CM

## 2025-08-14 PROCEDURE — 99999 PR PBB SHADOW E&M-EST. PATIENT-LVL V: CPT | Mod: PBBFAC,,,

## 2025-08-14 RX ORDER — TRIAMCINOLONE ACETONIDE 1 MG/G
CREAM TOPICAL 2 TIMES DAILY
Qty: 28.4 G | Refills: 1 | Status: SHIPPED | OUTPATIENT
Start: 2025-08-14

## 2025-09-03 ENCOUNTER — OFFICE VISIT (OUTPATIENT)
Dept: FAMILY MEDICINE | Facility: CLINIC | Age: 70
End: 2025-09-03
Payer: MEDICARE

## 2025-09-03 VITALS
HEART RATE: 54 BPM | WEIGHT: 167.13 LBS | DIASTOLIC BLOOD PRESSURE: 65 MMHG | HEIGHT: 68 IN | OXYGEN SATURATION: 99 % | SYSTOLIC BLOOD PRESSURE: 120 MMHG | BODY MASS INDEX: 25.33 KG/M2

## 2025-09-03 DIAGNOSIS — E08.621 DIABETIC ULCER OF TOE OF LEFT FOOT ASSOCIATED WITH DIABETES MELLITUS DUE TO UNDERLYING CONDITION, WITH FAT LAYER EXPOSED: ICD-10-CM

## 2025-09-03 DIAGNOSIS — I73.9 PAD (PERIPHERAL ARTERY DISEASE): Primary | Chronic | ICD-10-CM

## 2025-09-03 DIAGNOSIS — Z89.511 S/P BKA (BELOW KNEE AMPUTATION), RIGHT: Chronic | ICD-10-CM

## 2025-09-03 DIAGNOSIS — E78.2 MIXED HYPERLIPIDEMIA: Chronic | ICD-10-CM

## 2025-09-03 DIAGNOSIS — L97.522 DIABETIC ULCER OF TOE OF LEFT FOOT ASSOCIATED WITH DIABETES MELLITUS DUE TO UNDERLYING CONDITION, WITH FAT LAYER EXPOSED: ICD-10-CM

## 2025-09-03 DIAGNOSIS — N40.1 BENIGN PROSTATIC HYPERPLASIA WITH LOWER URINARY TRACT SYMPTOMS, SYMPTOM DETAILS UNSPECIFIED: Chronic | ICD-10-CM

## 2025-09-03 DIAGNOSIS — E11.3553 TYPE 2 DIABETES MELLITUS WITH STABLE PROLIFERATIVE RETINOPATHY OF BOTH EYES, WITH LONG-TERM CURRENT USE OF INSULIN: Chronic | ICD-10-CM

## 2025-09-03 DIAGNOSIS — Z79.4 TYPE 2 DIABETES MELLITUS WITH STABLE PROLIFERATIVE RETINOPATHY OF BOTH EYES, WITH LONG-TERM CURRENT USE OF INSULIN: Chronic | ICD-10-CM

## 2025-09-03 DIAGNOSIS — I50.30 HEART FAILURE WITH PRESERVED EJECTION FRACTION, NYHA CLASS II: Chronic | ICD-10-CM

## 2025-09-03 DIAGNOSIS — N18.31 STAGE 3A CHRONIC KIDNEY DISEASE: Chronic | ICD-10-CM

## 2025-09-03 PROCEDURE — 99999 PR PBB SHADOW E&M-EST. PATIENT-LVL V: CPT | Mod: PBBFAC,,, | Performed by: FAMILY MEDICINE
